# Patient Record
Sex: FEMALE | NOT HISPANIC OR LATINO | URBAN - METROPOLITAN AREA
[De-identification: names, ages, dates, MRNs, and addresses within clinical notes are randomized per-mention and may not be internally consistent; named-entity substitution may affect disease eponyms.]

---

## 2021-03-30 ENCOUNTER — OFFICE VISIT CONVERTED (OUTPATIENT)
Dept: INTERNAL MEDICINE | Facility: CLINIC | Age: 71
End: 2021-03-30
Attending: NURSE PRACTITIONER

## 2021-03-30 ENCOUNTER — CONVERSION ENCOUNTER (OUTPATIENT)
Dept: INTERNAL MEDICINE | Facility: CLINIC | Age: 71
End: 2021-03-30

## 2021-03-30 ENCOUNTER — HOSPITAL ENCOUNTER (OUTPATIENT)
Dept: OTHER | Facility: HOSPITAL | Age: 71
Discharge: HOME | End: 2021-03-30
Attending: NURSE PRACTITIONER

## 2021-03-30 LAB
AMPHET UR QL CFM: NEGATIVE
BARBITURATES UR QL: NEGATIVE
BASOPHILS # BLD AUTO: 0.02 10*3/UL (ref 0–0.2)
BASOPHILS NFR BLD AUTO: 0.3 % (ref 0–3)
BENZODIAZ UR QL SCN: POSITIVE
CONV ABS IMM GRAN: 0.02 10*3/UL (ref 0–0.2)
CONV AMP/METHAMP UR: NEGATIVE
CONV COCAINE, UR: NEGATIVE
CONV IMMATURE GRAN: 0.3 % (ref 0–1.8)
DEPRECATED RDW RBC AUTO: 46.2 FL (ref 36.4–46.3)
EOSINOPHIL # BLD AUTO: 0.06 10*3/UL (ref 0–0.7)
EOSINOPHIL # BLD AUTO: 0.9 % (ref 0–7)
ERYTHROCYTE [DISTWIDTH] IN BLOOD BY AUTOMATED COUNT: 14.6 % (ref 11.7–14.4)
HCT VFR BLD AUTO: 41.5 % (ref 37–47)
HGB BLD-MCNC: 12.6 G/DL (ref 12–16)
LYMPHOCYTES # BLD AUTO: 0.75 10*3/UL (ref 1–5)
LYMPHOCYTES NFR BLD AUTO: 10.8 % (ref 20–45)
MCH RBC QN AUTO: 26.2 PG (ref 27–31)
MCHC RBC AUTO-ENTMCNC: 30.4 G/DL (ref 33–37)
MCV RBC AUTO: 86.3 FL (ref 81–99)
MDMA UR QL SCN: NEGATIVE
METHADONE UR QL SCN: NEGATIVE
MONOCYTES # BLD AUTO: 0.36 10*3/UL (ref 0.2–1.2)
MONOCYTES NFR BLD AUTO: 5.2 % (ref 3–10)
NEUTROPHILS # BLD AUTO: 5.75 10*3/UL (ref 2–8)
NEUTROPHILS NFR BLD AUTO: 82.5 % (ref 30–85)
NRBC CBCN: 0 % (ref 0–0.7)
OPIATES UR QL SCN: NEGATIVE
OXYCODONE UR QL SCN: NEGATIVE
PCP UR QL: NEGATIVE
PLATELET # BLD AUTO: 333 10*3/UL (ref 130–400)
PMV BLD AUTO: 10.4 FL (ref 9.4–12.3)
RBC # BLD AUTO: 4.81 10*6/UL (ref 4.2–5.4)
THC SERPLBLD CFM-MCNC: NEGATIVE NG/ML
WBC # BLD AUTO: 6.96 10*3/UL (ref 4.8–10.8)

## 2021-03-31 LAB
ALBUMIN SERPL-MCNC: 4.1 G/DL (ref 3.5–5)
ALBUMIN/GLOB SERPL: 1.1 {RATIO} (ref 1.4–2.6)
ALP SERPL-CCNC: 90 U/L (ref 43–160)
ALT SERPL-CCNC: 15 U/L (ref 10–40)
ANION GAP SERPL CALC-SCNC: 18 MMOL/L (ref 8–19)
AST SERPL-CCNC: 23 U/L (ref 15–50)
BILIRUB SERPL-MCNC: 0.21 MG/DL (ref 0.2–1.3)
BUN SERPL-MCNC: 18 MG/DL (ref 5–25)
BUN/CREAT SERPL: 16 {RATIO} (ref 6–20)
CALCIUM SERPL-MCNC: 9.6 MG/DL (ref 8.7–10.4)
CHLORIDE SERPL-SCNC: 102 MMOL/L (ref 99–111)
CHOLEST SERPL-MCNC: 213 MG/DL (ref 107–200)
CHOLEST/HDLC SERPL: 3.9 {RATIO} (ref 3–6)
CONV CO2: 25 MMOL/L (ref 22–32)
CONV TOTAL PROTEIN: 7.8 G/DL (ref 6.3–8.2)
CREAT UR-MCNC: 1.15 MG/DL (ref 0.5–0.9)
GFR SERPLBLD BASED ON 1.73 SQ M-ARVRAT: 47 ML/MIN/{1.73_M2}
GLOBULIN UR ELPH-MCNC: 3.7 G/DL (ref 2–3.5)
GLUCOSE SERPL-MCNC: 85 MG/DL (ref 65–99)
HDLC SERPL-MCNC: 54 MG/DL (ref 40–60)
LDLC SERPL CALC-MCNC: 116 MG/DL (ref 70–100)
OSMOLALITY SERPL CALC.SUM OF ELEC: 293 MOSM/KG (ref 273–304)
POTASSIUM SERPL-SCNC: 4.4 MMOL/L (ref 3.5–5.3)
SODIUM SERPL-SCNC: 141 MMOL/L (ref 135–147)
T4 FREE SERPL-MCNC: 1.2 NG/DL (ref 0.9–1.8)
TRIGL SERPL-MCNC: 217 MG/DL (ref 40–150)
TSH SERPL-ACNC: 1.86 M[IU]/L (ref 0.27–4.2)
VLDLC SERPL-MCNC: 43 MG/DL (ref 5–37)

## 2021-04-15 ENCOUNTER — TELEMEDICINE CONVERTED (OUTPATIENT)
Dept: PSYCHIATRY | Facility: CLINIC | Age: 71
End: 2021-04-15
Attending: STUDENT IN AN ORGANIZED HEALTH CARE EDUCATION/TRAINING PROGRAM

## 2021-05-10 NOTE — H&P
"   History and Physical      Patient Name: Payal Singer   Patient ID: [id number]   Sex: Female   YOB: 1950        Visit Date: March 30, 2021    Provider: BREE Lovell   Location: [facility]   Location Address: [address] [place] [address], [address]  [place], [place]  [id number]   Location Phone: [phone]          Chief Complaint  · New Patient/ Establish Care  · \" cant sleep at night \"      History Of Present Illness  Payal Singer is a 71 year old female who presents for evaluation and treatment of:      Last PCP: [place], [place] Dr. Sylvia Paredes at [facility]   Fax number [phone] phone [phone] address: [address] [place] [address]  Last Labs: this past summer  Mammo: 2019  Colonoscopy: 2019  Flu 20-21: no  Pneumonia Shot: one in past    not sleeping well since being out of Seroquel    anxiety/depression-had hallucinations with a previous hospitalization while in [place]  has been on sertraline and venlafaxine together for a while  she has been out of the venlafaxine since the move from [place] about 1 mth ago   is verbally abusive and has some psychiatric illness as well. Daughter reports that he is currently under eval for dementia. pt reports feeling safe in her home and she goes into her room if her  becomes angry/upset. They are currently living with daughter's family    nerve pain of scalp-started on gabapentin for this. taking 300mg bid.    HTN-initially very elevated but believes this may be anxiety related. no checking at home routinely  HLD-no leg cramps       Past Medical History  Disease Name Date Onset Notes   Allergies --  --    Anxiety --  --    Depression (emotion) --  --    Forgetfulness --  --    Hypertension --  --    Reflux --  --          Past Surgical History  Procedure Name Date Notes   Cataract surgery 2002 --    Gallbladder removal 1971 --          Medication List  Name Date Started Instructions " "  aspirin 81 mg oral tablet,delayed release (DR/EC)  take 1 tablet (81 mg) by oral route once daily   atorvastatin 20 mg oral tablet  take 1 tablet (20 mg) by oral route once daily   clindamycin phosphate 1 % topical solution  for scalp   gabapentin 600 mg oral tablet  take .5 tablet (300 mg) by oral route 2 times per day   levothyroxine 75 mcg oral capsule  take 1 capsule (75 mcg) by oral route once daily   metoprolol tartrate 25 mg oral tablet  take 2 tablets (50 mg) by oral route once daily   quetiapine 25 mg oral tablet 03/30/2021 take 1 tablet (25 mg) by oral route 1 times per day   sertraline 100 mg oral tablet  take 2 tablets (200 mg) by oral route once daily   venlafaxine 150 mg oral tablet extended release 24hr  take 1 tablet (150 mg) by oral route once daily in the morning at the same time each day with food   Vitamin D2 1,250 mcg (50,000 unit) oral capsule  take 1 capsule by oral route weekly         Allergy List  Allergen Name Date Reaction Notes   Floxin --  --  --        Allergies Reconciled  Family Medical History  Disease Name Relative/Age Notes   Stroke Father/  Mother/   brother/sister   Heart Disease Father/  Mother/   --    Diabetes Father/   brother/sister aunt/uncle         Social History  Finding Status Start/Stop Quantity Notes   Tobacco Never --/-- --  --          Review of Systems  · Constitutional  o Denies  o : fever, fatigue, weight loss, weight gain  · Cardiovascular  o Denies  o : lower extremity edema, claudication, chest pressure, palpitations  · Respiratory  o Denies  o : shortness of breath, wheezing, frequent cough, hemoptysis, dyspnea on exertion  · Gastrointestinal  o Denies  o : nausea, vomiting, diarrhea, constipation, abdominal pain      Vitals  Date Time BP Position Site L\R Cuff Size HR RR TEMP (F) WT  HT  BMI kg/m2 BSA m2 O2 Sat FR L/min FiO2 HC       03/30/2021 02:33 /78 Sitting    77 - R  98.7 165lbs 8oz 5'  3\" 29.32 1.83 93 %      03/30/2021 02:47 /72 " Sitting                       Physical Examination  · Constitutional  o Appearance  o : no acute distress, well-nourished  · Head and Face  o Head  o :   § Inspection  § : atraumatic, normocephalic  · Eyes  o Eyes  o : extraocular movements intact, no scleral icterus, no conjunctival injection  · Ears, Nose, Mouth and Throat  o Ears  o :   § External Ears  § : normal  o Nose  o :   § Intranasal Exam  § : nares patent  o Oral Cavity  o :   § Oral Mucosa  § : moist mucous membranes  · Respiratory  o Respiratory Effort  o : breathing comfortably, symmetric chest rise  o Auscultation of Lungs  o : clear to asculatation bilaterally, no wheezes, rales, or rhonchii  · Cardiovascular  o Heart  o :   § Auscultation of Heart  § : regular rate and rhythm, no murmurs, rubs, or gallops  o Peripheral Vascular System  o :   § Extremities  § : no edema  · Lymphatic  o Neck  o : no lymphadenopathy present  · Neurologic  o Mental Status Examination  o :   § Orientation  § : grossly oriented to person, place and time  o Gait and Station  o :   § Gait Screening  § : normal gait  · Psychiatric  o General  o : normal mood and affect          Results  · In-Office Procedures  o Lab procedure  § IOP - Urine Drug Screen In-House [facility] (11282)   § Amphetamines Ur Ql: Negative   § Barbiturates Ur Ql: Negative   § Buprenorphine+Nor Ur Ql Scn: Negative   § Benzodiaz Ur Ql: Positive   § Cocaine Ur Ql: Negative   § Methadone Ur Ql: Negative   § Methamphet Ur Ql: Negative   § MDMA Ur Ql Scn: Negative   § Opiates Ur Ql: Negative   § Oxycodone Ur Ql: Negative   § PCP Ur Ql: Negative   § THC Ur Ql: Negative   § Temp in Range?: Within/Acceptable   § Control Seen?: Yes       Assessment  · Screening for depression     V79.0/Z13.89  · Visit for screening mammogram     V76.12/Z12.31  · Depression     311/F32.9  She is currently only taking sertraline for the last month. She was requesting that we restart venlafaxine today but discussed concerns with  duplicate therapy with sertraline and venlafaxine at rather high doses. She is agreeable to staying on sertraline only a until she sees psychiatry. We will restart Seroquel 25 mg nightly at night to help with insomnia.  · Essential hypertension     401.9/I10  · Hyperlipidemia     272.4/E78.5  We will check labs in the office today. Will review and adjust medications as needed  · Hypothyroidism     244.9/E03.9  We will check labs in the office today. Will review and adjust medications as needed  · Insomnia, unspecified     780.52/G47.00  Reviewed sleep hygiene behaviors. Restarting Seroquel  · Anxiety     300.02/F41.1  · Hypertension     401.9/I10  Discussed elevated blood pressure at today's visit. Discussed risks of blood pressure elevation including death, heart attack, stroke, chronic kidney disease, blindness. Follow a low-salt diet and increase exercise. Discussed importance of medication compliance and avoidance of missing doses. Continue current meds at this time. We will monitor at follow-up and adjust blood pressure medications if necessary. ER with chest pain, palpitations, vision loss, unilateral weakness, or altered mental status. Patient verbalized understanding  · Neuropathic pain     729.2/M79.2  He has been taking gabapentin 300 mg twice daily and this has been effective for controlling pain of her scalp. She reports initially taking 800 mg which caused significant issues with cognition and frequent falls. She reports that these issues resolved with decreasing the dose. We will get UDS and controlled substance agreement completed during the visit today.    Problems Reconciled  Plan  · Orders  o Screening Mammography; Bilateral 3D (70107, , 50992) - V76.12/Z12.31 - 03/30/2021  o CBC with Auto Diff [facility] (45610) - 401.9/I10 - 03/30/2021  o HTN/Lipid Panel (CMP, Lipid) [facility] (55364, 52919) - 272.4/E78.5 - 03/30/2021  o Thyroid Profile (46459, THYII, 10297) - 244.9/E03.9 - 03/30/2021  o ACO-18: Positive  screen for clinical depression using a standardized tool and a follow-up plan documented () - - 03/30/2021  o ACO-14: Influenza immunization was not administered for reasons documented [facility] () - - 03/30/2021  o ACO-13: Fall Risk Screening with 2 or more falls in past year or any fall with injury in the past year (1100F) - - 03/30/2021  o ACO-39: Current medications updated and reviewed (, 1159F) - - 03/30/2021  o PSYCHIATRY CONSULTATION (PSYCH) - 300.02/F41.1, 311/F32.9 - 03/30/2021   Dr. Peterson  · Medications  o quetiapine 25 mg oral tablet   SIG: take 1 tablet (25 mg) by oral route 1 times per day   DISP: (90) Tablet with 1 refills  Adjusted on 03/30/2021     o Medications have been Reconciled  o Transition of Care or Provider Policy  · Instructions  o Depression Screen completed and scanned into the EMR under the designated folder within the patient's documents.  o Today's PHQ-9 result is 17  o Advised that cheeses and other sources of dairy fats, animal fats, fast food, and the extras (candy, pastries, pies, doughnuts and cookies) all contain LDL raising nutrients. Advised to increase fruits, vegetables, whole grains, and to monitor portion sizes.   o Avoid any electronic use for at least 30 minutes prior to bed time. Cell phone screens, tablets and TVs imitate daylight, so your brain can become confused on the time of day. No caffeine use in the late afternoon and evenings.  o Patient was educated/instructed on their diagnosis, treatment and medications prior to discharge from the clinic today.  o Call the office with any concerns or questions.  · Disposition  o Call or Return if symptoms worsen or persist.  o Follow up in 2 months  o labs drawn in house today  o Prescriptions sent electronically            Electronically Signed by: BREE Lovell -Author on March 30, 2021 07:46:21 PM

## 2021-05-11 NOTE — H&P
"   History and Physical      Patient Name: Payal Singer   Patient ID: [id number]   Sex: Female   YOB: 1950    Referring Provider: Alyson GIRON    Visit Date: April 15, 2021    Provider: Riki Peterson MD   Location: [facility]   Location Address: [address] [place] [address]  [address] [place] [address]  [id number]   Location Phone: [phone]          Chief Complaint     \"The main thing is hallucinations.\"       History Of Present Illness  Payal Singer is a 71 year old female who presents to the office today referred by Alyson GIRON.   Chart review 4/15: Seen 3/30 to establish care. Hx of anx/dep avh in [place]. on both sertraline and venlafaxine, was on Seroquel. Hx of HTN, GERD. On gabapentin 600 tid, sertraline 200 d, venla 150 d, quetiap 25 qhs. March 2021 labs: cbc shows low mch, high rdw; Cr 1.15 high, abnormal lipids, TSH and fT4 wnl. No OP head imaging, ekg.   Virtual visit via Zoom audio and video due to the COVID-19 pandemic. Patient is accepting of and agreeable to appointment. The appointment consisted of the patient and I only. Interview: Patient was also accompanied by her daughter Meagan briefly. I received some collateral from her. Patient reports beginning to see hallucinations in summer of last year. At that time, she saw a man with a skull and candles lying on her couch when she was going to sleep. She ran out of the room and called her neighbors, who returned to the room and did not see anything. Patient also reports seeing bugs, and shadows. She was admitted to a hospital in [place] for these hallucinations, where she was placed on quetiapine. It has been helpful for her, but she continues to experience the hallucinations. Lately, she sees a bug coming down from the ceiling on a white stalk before she goes to sleep each night. Once she is asleep, she no longer experiences the hallucinations. She never experiences them during the day. She has never experienced " "hallucinations before the summer of last year.   The hallucinations only occur as she is going to sleep (hypnagogic). The hallucinations do not occur every night. The hallucinations do not occur in the middle of the night or in the morning. The hallucinations include both visual and tactile hallucinations, vivid. Never auditory. Denies cataplexy, denies sleep paralysis. Notes some daytime sleepiness.   Patient has never seen a neurologist before. She had been set up to see one prior to moving from [place] to [place], but because of the move, she never saw the neurologist. She does note she is more forgetful these days. Her daughter has begun to help her more with groceries and finances. She also notes trouble with walking since the summer of last year. Last summer, she had a nasty fall that involved hitting her head and needing stitches.   Patient is somewhat of a poor historian. Often times she will answer a question with \"no,\" and then contradict her self moments later. She does not understand her medical conditions or of the medications that she is on. I also spoke with Meagan who confirms cognitive decline recently. Meagan is helping patient more with her groceries and finances these days. Otherwise patient can take care of herself. Patient denies SI HI AH. Has access to weapons that are locked away. Psychiatric review of systems is positive for anxiety, depression, and hallucinations.   Past Psychiatric History:  Began Psychiatric Treatment: The summer of last year when she was diagnosed with depression and anxiety   Dx: Depression and anxiety   Psychiatrist: She cannot remember the name. But she saw a psychiatrist inpatient in [place] last summer. She also saw psychiatrist afterwards for 1-2 visits.   Therapist: Denies   : Denies   Admissions History: Admission in [place] is unclear if he was psychiatric only. This occurred last summer for 3 to 4 days.   Medication Trials: Venlafaxine and sertraline, " both of which were helpful.   Self-Harm: Denies   Suicide Attempts: Denies   Substance Abuse History:  Types: Denies all, including illicit   Withdrawl Symptoms: Denies   Longest period sober: Not applicable   AA: N/A   Admissions History: Denies   Residential History: Denies   Legal: N/A   Social History:  Marital Status:    Employed: No     Kids: 4 children   House: Lives in a house    Hx: Denies   Family History:  Suicide Attempts: Denies   Suicide Completions: Denies   Substance Use: Denies   Psychiatric Conditions: Denies. Denies dementia in the family.    depression, psychosis, anxiety: Deferred   Developmental History:  Born: [place]   Siblings: 1 brother and 1 sister   Childhood: no abuse   High School: Completed   College: Denies       Past Medical History  Disease Name Date Onset Notes   Allergies --  --    Anxiety --  --    Depression (emotion) --  --    Forgetfulness --  --    Hypertension --  --    Reflux --  --          Past Surgical History  Procedure Name Date Notes   Cataract surgery  --    Gallbladder removal  --          Medication List  Name Date Started Instructions   aspirin 81 mg oral tablet,delayed release (DR/EC)  take 1 tablet (81 mg) by oral route once daily   atorvastatin 40 mg oral tablet 2021 take 1 tablet (40 mg) by oral route once daily for 90 days   clindamycin phosphate 1 % topical solution  for scalp   gabapentin 600 mg oral tablet  take .5 tablet (300 mg) by oral route 2 times per day   levothyroxine 75 mcg oral capsule  take 1 capsule (75 mcg) by oral route once daily   metoprolol tartrate 25 mg oral tablet  take 2 tablets (50 mg) by oral route once daily   omeprazole 40 mg oral capsule,delayed release(DR/EC) 2021 take 1 capsule (40 mg) by oral route once daily before a meal for 90 days   quetiapine 25 mg oral tablet 2021 take 1 tablet (25 mg) by oral route 1 times per day   sertraline 100 mg oral tablet  take 2 tablets (200 mg) by  oral route once daily   venlafaxine 150 mg oral tablet extended release 24hr  take 1 tablet (150 mg) by oral route once daily in the morning at the same time each day with food   Vitamin D2 1,250 mcg (50,000 unit) oral capsule  take 1 capsule by oral route weekly         Allergy List  Allergen Name Date Reaction Notes   Floxin --  --  --          Family Medical History  Disease Name Relative/Age Notes   Stroke Father/  Mother/   brother/sister   Heart Disease Father/  Mother/   --    Diabetes Father/   brother/sister aunt/uncle         Social History  Finding Status Start/Stop Quantity Notes   Tobacco Never --/-- --  --          Immunizations  NameDate Admin Mfg Trade Name Lot Number Route Inj VIS Given VIS Publication   InfluenzaDeferred 03/30/2021 NE Not Entered  NE NE     Comments:          Review of Systems  · Constitutional  o Denies  o : fatigue, night sweats  · Eyes  o Denies  o : double vision, blurred vision  · HENT  o Denies  o : vertigo, recent head injury  · Cardiovascular  o Denies  o : chest pain, irregular heart beats  · Respiratory  o Admits  o : shortness of breath  o Denies  o : productive cough  · Gastrointestinal  o Denies  o : nausea, vomiting  · Genitourinary  o Denies  o : dysuria, urinary retention  · Integument  o Denies  o : hair growth change, new skin lesions  · Neurologic  o Denies  o : altered mental status, seizures  · Musculoskeletal  o Denies  o : joint swelling, limitation of motion  · Endocrine  o Denies  o : cold intolerance, heat intolerance  · Psychiatric  o Admits  o : anxiety, hallucinations  o Denies  o : depression, post traumatic stress disorder, obsessive compulsive disorder, psychosis, casandra  · Allergic-Immunologic  o Denies  o : frequent illnesses      Physical Examination  · Mental Status Exam  o Appearance  o : good eye contact, normal street clothes, groomed, sitting on a couch  o Behavior  o : pleasant and cooperative  o Motor  o : no abnormal  o Speech  o : normal  "rhythm, rate, volume, tone, not hyperverbal, not pressured, normal prosidy  o Mood  o : \"Okay\"  o Affect  o : Slightly constricted, no tears  o Thought Content  o : negative suicidal ideations, negative homicidal ideations, negative obsessions  o Perceptions  o : negative auditory hallucinations, negative visual hallucinations  o Thought Process  o : Slightly circumstantial  o Insight/Judgement  o : fair/fair  o Cognition  o : grossly intact  o Attention  o : intact          Assessment  · Screening for depression     V79.0/Z13.31  · Anxiety     300.02/F41.1  · Depression (emotion)     311/F32.9  · Hallucinations     780.1/R44.3       Presentation most consistent with developing dementia, likely Lewy body dementia.  Patient also reports worsening gait abnormalities; she fell in the summer of last year, injuring her head, and requiring stitches.  Referral to neurologist is appropriate for further management.  Patient also has a history of depression, possible PTSD by review of chart.  Patient did not mention PTSD during the interview, but she is not a very good historian.  Often times she will say no to a question, and then contradict herself only a few moments later.  She has some trouble with knowing her medications or her medical conditions.    Continue sertraline and Seroquel.  The Seroquel is helping her to some extent.  Caution is advised in terms of increasing the dose given the likely sensitivity she has to antipsychotics.  Patient advised not to restart venlafaxine as she is already on sertraline.    We will see the patient back in 2 months to discuss PTSD, depression more fully.    Psychotherapy is deferred at this time.       Plan  · Orders  o ACO-18: Positive screen for clinical depression using a standardized tool and a follow-up plan documented () - V79.0/Z13.31 - 04/15/2021  o NEUROLOGY CONSULTATION. (NEURO) - V79.0/Z13.31, 311/F32.9, 300.02/F41.1, 780.1/R44.3 - 04/15/2021   Rule out lewy body " dementia  o ACO-39: Current medications updated and reviewed (, 1159F) - - 04/15/2021  · Medications  o venlafaxine 150 mg oral tablet extended release 24hr   SIG: take 1 tablet (150 mg) by oral route once daily in the morning at the same time each day with food   DISP: (0) Tablet with 0 refills  Discontinued on 04/15/2021     o Medications have been Reconciled  o Transition of Care or Provider Policy  · Instructions  o Consults: neurology for developing LB dementia.  o Risk Assessment: Risk of self-harm acutely is low. Risk factors include anxiety disorder, depressive disorder, access to weapons, recent psychosocial stressors (pandemic). Protective factors include no family history, no present SI, no history of suicide attempts or self-harm in the past, no access to weapons, no AODA, healthcare seeking, future orientation, willingness to engage in care. Risk of self-harm chronically is also low, but could be further elevated in the event of treatment noncompliance and/or AODA.  o Safety: No acute safety concerns.  o Medications: No change to medications. Continue sertraline 200 mg a day, this dose may have to be decreased given elevated creatinine. Patient has discontinued venlafaxine, and this is appropriate. Continue Seroquel 25 mg p.o. nightly.  o Therapy: Deferred.  o Follow Up: 2 months.  · Disposition  o Follow Up in 2 months.  · Correspondence  o Behavioral Health Letter to Referring MD (Alyson GIRON) - 04/15/2021            Electronically Signed by: Riki Peterson MD -Author on April 15, 2021 11:03:53 AM

## 2021-05-14 VITALS
HEART RATE: 77 BPM | TEMPERATURE: 98.7 F | SYSTOLIC BLOOD PRESSURE: 184 MMHG | DIASTOLIC BLOOD PRESSURE: 78 MMHG | WEIGHT: 165.5 LBS | OXYGEN SATURATION: 93 % | BODY MASS INDEX: 29.32 KG/M2 | HEIGHT: 63 IN

## 2021-05-24 ENCOUNTER — HOSPITAL ENCOUNTER (OUTPATIENT)
Dept: SURGERY | Facility: CLINIC | Age: 71
Discharge: HOME | End: 2021-05-24
Attending: UROLOGY

## 2021-05-24 LAB
ANION GAP SERPL CALC-SCNC: 14 MMOL/L (ref 8–19)
APPEARANCE UR: ABNORMAL
BILIRUB UR QL: NEGATIVE
BUN SERPL-MCNC: 18 MG/DL (ref 5–25)
BUN/CREAT SERPL: 18 {RATIO} (ref 6–20)
CALCIUM SERPL-MCNC: 9.2 MG/DL (ref 8.7–10.4)
CHLORIDE SERPL-SCNC: 101 MMOL/L (ref 99–111)
COLOR UR: YELLOW
CONV BACTERIA: ABNORMAL
CONV CO2: 29 MMOL/L (ref 22–32)
CONV COLLECTION SOURCE (UA): ABNORMAL
CONV HYALINE CASTS IN URINE MICRO: ABNORMAL /[LPF]
CONV UROBILINOGEN IN URINE BY AUTOMATED TEST STRIP: 1 {EHRLICHU}/DL (ref 0.1–1)
CREAT UR-MCNC: 1.02 MG/DL (ref 0.5–0.9)
GFR SERPLBLD BASED ON 1.73 SQ M-ARVRAT: 55 ML/MIN/{1.73_M2}
GLUCOSE SERPL-MCNC: 85 MG/DL (ref 65–99)
GLUCOSE UR QL: NEGATIVE MG/DL
HGB UR QL STRIP: ABNORMAL
KETONES UR QL STRIP: NEGATIVE MG/DL
LEUKOCYTE ESTERASE UR QL STRIP: ABNORMAL
NITRITE UR QL STRIP: POSITIVE
OSMOLALITY SERPL CALC.SUM OF ELEC: 291 MOSM/KG (ref 273–304)
PH UR STRIP.AUTO: 5.5 [PH] (ref 5–8)
POTASSIUM SERPL-SCNC: 3.9 MMOL/L (ref 3.5–5.3)
PROT UR QL: 300 MG/DL
RBC #/AREA URNS HPF: ABNORMAL /[HPF]
SODIUM SERPL-SCNC: 140 MMOL/L (ref 135–147)
SP GR UR: 1.03 (ref 1–1.03)
WBC #/AREA URNS HPF: ABNORMAL /[HPF]

## 2021-06-01 ENCOUNTER — CONVERSION ENCOUNTER (OUTPATIENT)
Dept: INTERNAL MEDICINE | Facility: CLINIC | Age: 71
End: 2021-06-01

## 2021-06-01 ENCOUNTER — HOSPITAL ENCOUNTER (OUTPATIENT)
Dept: OTHER | Facility: HOSPITAL | Age: 71
Discharge: HOME | End: 2021-06-01
Attending: NURSE PRACTITIONER

## 2021-06-01 ENCOUNTER — TRANSCRIBE ORDERS (OUTPATIENT)
Dept: ADMINISTRATIVE | Facility: HOSPITAL | Age: 71
End: 2021-06-01

## 2021-06-01 ENCOUNTER — OFFICE VISIT CONVERTED (OUTPATIENT)
Dept: INTERNAL MEDICINE | Facility: CLINIC | Age: 71
End: 2021-06-01
Attending: NURSE PRACTITIONER

## 2021-06-01 DIAGNOSIS — Z12.31 SCREENING MAMMOGRAM, ENCOUNTER FOR: Primary | ICD-10-CM

## 2021-06-01 LAB
APPEARANCE UR: ABNORMAL
BILIRUB UR QL STRIP: NORMAL
BILIRUB UR QL: NEGATIVE
COLOR UR: NORMAL
COLOR UR: YELLOW
CONV BACTERIA: ABNORMAL
CONV CLARITY OF URINE: NORMAL
CONV COLLECTION SOURCE (UA): ABNORMAL
CONV PROTEIN IN URINE BY AUTOMATED TEST STRIP: NORMAL
CONV UROBILINOGEN IN URINE BY AUTOMATED TEST STRIP: 1 {EHRLICHU}/DL (ref 0.1–1)
CONV UROBILINOGEN IN URINE BY AUTOMATED TEST STRIP: NORMAL
GLUCOSE UR QL: NEGATIVE
GLUCOSE UR QL: NEGATIVE MG/DL
HGB UR QL STRIP: ABNORMAL
HGB UR QL STRIP: NORMAL
KETONES UR QL STRIP: ABNORMAL MG/DL
KETONES UR QL STRIP: NORMAL
LEUKOCYTE ESTERASE UR QL STRIP: ABNORMAL
LEUKOCYTE ESTERASE UR QL STRIP: NEGATIVE
NITRITE UR QL STRIP: NEGATIVE
NITRITE UR QL STRIP: NEGATIVE
PH UR STRIP.AUTO: 5.5 [PH]
PH UR STRIP.AUTO: 5.5 [PH] (ref 5–8)
PROT UR QL: >300 MG/DL
RBC #/AREA URNS HPF: ABNORMAL /[HPF]
SP GR UR: 1.03 (ref 1–1.03)
SP GR UR: NORMAL
SQUAMOUS SPT QL MICRO: ABNORMAL /[HPF]
WBC #/AREA URNS HPF: ABNORMAL /[HPF]

## 2021-06-02 ENCOUNTER — TRANSCRIBE ORDERS (OUTPATIENT)
Dept: ADMINISTRATIVE | Facility: HOSPITAL | Age: 71
End: 2021-06-02

## 2021-06-02 DIAGNOSIS — R44.3 HALLUCINATIONS: ICD-10-CM

## 2021-06-02 DIAGNOSIS — R51.9 FREQUENT HEADACHES: Primary | ICD-10-CM

## 2021-06-04 LAB
AMPICILLIN SUSC ISLT: 8
AMPICILLIN+SULBAC SUSC ISLT: <=2
BACTERIA UR CULT: ABNORMAL
CEFAZOLIN SUSC ISLT: <=4
CEFEPIME SUSC ISLT: <=0.12
CEFTAZIDIME SUSC ISLT: <=1
CEFTRIAXONE SUSC ISLT: <=0.25
CIPROFLOXACIN SUSC ISLT: <=0.25
ERTAPENEM SUSC ISLT: <=0.12
GENTAMICIN SUSC ISLT: <=1
LEVOFLOXACIN SUSC ISLT: <=0.12
NITROFURANTOIN SUSC ISLT: <=16
PIP+TAZO SUSC ISLT: <=4
TMP SMX SUSC ISLT: <=20
TOBRAMYCIN SUSC ISLT: <=1

## 2021-06-06 NOTE — PROGRESS NOTES
"   Progress Note      Patient Name: Payal Singer   Patient ID: [id number]   Sex: Female   YOB: 1950    Referring Provider: Alyson GIRON    Visit Date: June 1, 2021    Provider: BREE HARPER   Location: [facility]   Location Address: [address] [place] [address], [address]  [place], [place]  [id number]   Location Phone: [phone]          Chief Complaint  · Follow Up   · \" Labs done last week \"  · \" bad headaches lately\"   · \" having panic attacks \"      History Of Present Illness  Payal Singer is a 71 year old female who presents for evaluation and treatment of:      Patient presents for a follow up on chronic management with her daughter.    Panic attacks- felt like she could not breath and tightness in her chest. They are occurring about once a week. She has discussed this with . Also seeing Dr. Peterson for anxiety, she has a follow up with him on June 15th. Hallucinating continues seeing rats, spiders, use to see people as well occurring about 1-2 times a week. Increased urination, checked with . Plans to transition off the Zoloft and onto venlafaxine.     Headaches- 3 this past week, weakness is on both side. Pain is in the back of the head and runs forward into the forehead. Neurology appointment this month related to headaches. No recent imaging. Starting to have forgetfulness.     HTN- metoprolol 25 mg, not on other medication prior. Denies chest pain, shortness of breath. Reports headaches in the occipital. Is not checking blood pressure often at home.     Urinary frequency continues with mild dysuria. Denies urgency, nausea, vomiting, diarrhea, abdominal pain.     Overall weakness per the daughter. She is having more frequent falls. Lives with her daughter, son in law and 5 kids.     Bilateral knee pain- several years now.       Past Medical History  Disease Name Date Onset Notes   Allergies --  --    Anxiety --  --    Depression (emotion) --  --  "   Forgetfulness --  --    Hallucination --  --    Hallucinations 04/15/2021 --    Head injury --  --    Hypertension --  --    PTSD (post-traumatic stress disorder) --  --    Reflux --  --          Past Surgical History  Procedure Name Date Notes   Cataract surgery 2002 --    Gallbladder removal 1971 --          Medication List  Name Date Started Instructions   aspirin 81 mg oral tablet,delayed release (DR/EC)  take 1 tablet (81 mg) by oral route once daily   atorvastatin 40 mg oral tablet 04/05/2021 take 1 tablet (40 mg) by oral route once daily for 90 days   clindamycin phosphate 1 % topical solution  for scalp   gabapentin 600 mg oral tablet  take .5 tablet (300 mg) by oral route 2 times per day   levothyroxine 75 mcg oral capsule 04/20/2021 take 1 capsule (75 mcg) by oral route once daily   metoprolol tartrate 25 mg oral tablet  take 2 tablets (50 mg) by oral route once daily   omeprazole 40 mg oral capsule,delayed release(DR/EC) 04/01/2021 take 1 capsule (40 mg) by oral route once daily before a meal for 90 days   quetiapine 25 mg oral tablet 03/30/2021 take 1 tablet (25 mg) by oral route 1 times per day   sertraline 100 mg oral tablet 05/19/2021 take 1 tablet (100 mg) by oral route once daily for 60 days   Vitamin D2 1,250 mcg (50,000 unit) oral capsule  take 1 capsule by oral route weekly         Allergy List  Allergen Name Date Reaction Notes   Floxin --  --  --        Allergies Reconciled  Family Medical History  Disease Name Relative/Age Notes   Stroke Father/  Mother/   brother/sister   Heart Disease Father/  Mother/   --    Diabetes Father/   brother/sister aunt/uncle   Family history of anxiety disorder  --          Social History  Finding Status Start/Stop Quantity Notes   Tobacco Never --/-- --  --          Immunizations  NameDate Admin Mfg Trade Name Lot Number Route Inj VIS Given VIS Publication   InfluenzaDeferred 03/30/2021 NE Not Entered  NE NE     Comments:          Review of  "Systems  · Constitutional  o Admits  o : falls  o Denies  o : fever, fatigue, weight loss, weight gain  · Cardiovascular  o Denies  o : lower extremity edema, claudication, chest pressure, palpitations  · Respiratory  o Denies  o : shortness of breath, wheezing, frequent cough, hemoptysis, dyspnea on exertion  · Gastrointestinal  o Denies  o : nausea, vomiting, diarrhea, constipation, abdominal pain  · Genitourinary  o Admits  o : urinary frequency, dysuria, incontinence  o Denies  o : urinary urgency, change in urine color, urinary retention  · Neurologic  o Admits  o : altered mental status, incoordination, memory problems, falls, headaches  o Denies  o : slurred speech , tremor, dizziness  · Psychiatric  o Admits  o : anxiety, hallucinations, memory changes  o Denies  o : depression, suicidal ideation, homicidal ideation, mood changes, SI/HI      Vitals  Date Time BP Position Site L\R Cuff Size HR RR TEMP (F) WT  HT  BMI kg/m2 BSA m2 O2 Sat FR L/min FiO2 HC       03/30/2021 02:33 /78 Sitting    77 - R  98.7 165lbs 8oz 5'  3\" 29.32 1.83 93 %      03/30/2021 02:47 /72 Sitting                 06/01/2021 02:07 /88 Sitting    64 - R  99.5 159lbs 0oz 5'  3\" 28.17 1.79 94 %      06/01/2021 03:07 /84 Sitting                       Physical Examination  · Constitutional  o Appearance  o : no acute distress, well-nourished  · Head and Face  o Head  o :   § Inspection  § : atraumatic, normocephalic  · Eyes  o Eyes  o : extraocular movements intact, no scleral icterus, no conjunctival injection  · Ears, Nose, Mouth and Throat  o Ears  o :   § External Ears  § : normal  § Otoscopic Examination  § : tympanic membrane appearance within normal limits bilaterally  o Nose  o :   § Intranasal Exam  § : nares patent  o Oral Cavity  o :   § Oral Mucosa  § : moist mucous membranes  o Throat  o :   § Oropharynx  § : no inflammation or lesions present, tonsils within normal limits  · Respiratory  o Respiratory " Effort  o : breathing comfortably, symmetric chest rise  o Auscultation of Lungs  o : clear to asculatation bilaterally, no wheezes, rales, or rhonchii  · Cardiovascular  o Heart  o :   § Auscultation of Heart  § : regular rate and rhythm, no murmurs, rubs, or gallops  o Peripheral Vascular System  o :   § Extremities  § : no edema  · Gastrointestinal  o Abdominal Examination  o :   § Abdomen  § : bowel sounds present, non-distended, non-tender  · Lymphatic  o Neck  o : no lymphadenopathy present  o Supraclavicular Nodes  o : no supraclavicular nodes  · Skin and Subcutaneous Tissue  o General Inspection  o : no lesions present, no areas of discoloration, skin turgor normal  · Neurologic  o Mental Status Examination  o :   § Orientation  § : grossly oriented to person, place and time  o Gait and Station  o :   § Gait Screening  § : normal gait  · Psychiatric  o General  o : normal mood and affect          Results  · In-Office Procedures  o Lab procedure  § IOP - Urinalysis without Microscopy ([facility]) [facility] ([id number])   § Color Ur: Orange   § Clarity Ur: Slightly cloudy   § Glucose Ur Ql Strip: Negative   § Bilirub Ur Ql Strip: Small   § Ketones Ur Ql Strip: Trace   § Sp Gr Ur Qn: greater than 1.030   § Hgb Ur Ql Strip: Moderate   § pH Ur-LsCnc: 5.5   § Prot Ur Ql Strip: >=300 mg/dL   § Urobilinogen Ur Strip-mCnc: 0.2 E.U./dL   § Nitrite Ur Ql Strip: Negative   § WBC Est Ur Ql Strip: Negative       Assessment  · Hallucinations     780.1/R44.3  Seeing , UTI treated at this time.   · Anxiety     300.02/F41.1  Continue regimen and follow up .   · Hypertension     401.9/I10  poorly controlled, will start lisinopril 10 mg to be renal protected as well and maintain her blood pressure. Follow up in 1 month. Blood pressure log advised.   · Depression (emotion)     311/F32.9  · Headache     784.0/R51.9  Neurology referral in place. MRI of the brain ordered.   · Urinary frequency     788.41/R35.0  infection present  on urine sample.     Problems Reconciled  Plan  · Orders  o Urinalysis with Reflex Microscopy ([facility]) (27207) - 780.1/R44.3 - 06/01/2021  o Urine culture (34653, 11676) - 780.1/R44.3 - 06/01/2021  o ACO-39: Current medications updated and reviewed (1159F, ) - - 06/01/2021  o MRI brain w/ contrast (76508) - 311/F32.9, 780.1/R44.3, 784.0/R51.9 - 06/01/2021  · Medications  o lisinopril 10 mg oral tablet   SIG: take 1 tablet (10 mg) by oral route once daily   DISP: (30) Tablet with 2 refills  Prescribed on 06/01/2021     o Macrobid 100 mg oral capsule   SIG: take 1 capsule (100 mg) by oral route every 12 hours with food for 7 days   DISP: (14) Capsule with 0 refills  Prescribed on 06/01/2021     o Medications have been Reconciled  o Transition of Care or Provider Policy  · Instructions  o Patient was educated/instructed on their diagnosis, treatment and medications prior to discharge from the clinic today.  o Call the office with any concerns or questions.  o Electronically Identified Patient Education Materials Provided Electronically  · Disposition  o Call or Return if symptoms worsen or persist.  o Meds sent to pharmacy  o 1 month follow up            Electronically Signed by: BREE HARPER -Author on June 1, 2021 05:21:45 PM

## 2021-06-10 ENCOUNTER — APPOINTMENT (OUTPATIENT)
Dept: MRI IMAGING | Facility: HOSPITAL | Age: 71
End: 2021-06-10

## 2021-06-15 ENCOUNTER — TELEMEDICINE (OUTPATIENT)
Dept: PSYCHIATRY | Facility: CLINIC | Age: 71
End: 2021-06-15

## 2021-06-15 DIAGNOSIS — F03.90 DEMENTIA WITHOUT BEHAVIORAL DISTURBANCE, UNSPECIFIED DEMENTIA TYPE: ICD-10-CM

## 2021-06-15 DIAGNOSIS — R44.3 HALLUCINATIONS: ICD-10-CM

## 2021-06-15 DIAGNOSIS — R68.89 FORGETFULNESS: Primary | ICD-10-CM

## 2021-06-15 DIAGNOSIS — F41.1 GENERALIZED ANXIETY DISORDER: ICD-10-CM

## 2021-06-15 PROBLEM — K21.9 ESOPHAGEAL REFLUX: Status: ACTIVE | Noted: 2021-06-15

## 2021-06-15 PROBLEM — F41.9 ANXIETY: Status: ACTIVE | Noted: 2021-06-15

## 2021-06-15 PROBLEM — F32.A DEPRESSION: Status: ACTIVE | Noted: 2021-06-15

## 2021-06-15 PROBLEM — S09.90XA HEAD INJURY: Status: ACTIVE | Noted: 2021-06-15

## 2021-06-15 PROBLEM — J01.80 OTHER ACUTE SINUSITIS: Status: ACTIVE | Noted: 2021-06-15

## 2021-06-15 PROBLEM — F43.10 PTSD (POST-TRAUMATIC STRESS DISORDER): Status: ACTIVE | Noted: 2021-06-15

## 2021-06-15 PROBLEM — I10 HYPERTENSION: Status: ACTIVE | Noted: 2021-06-15

## 2021-06-15 PROCEDURE — 99214 OFFICE O/P EST MOD 30 MIN: CPT | Performed by: STUDENT IN AN ORGANIZED HEALTH CARE EDUCATION/TRAINING PROGRAM

## 2021-06-15 RX ORDER — CLINDAMYCIN PHOSPHATE 11.9 MG/ML
SOLUTION TOPICAL
COMMUNITY
Start: 2021-04-19 | End: 2022-07-19

## 2021-06-15 RX ORDER — VENLAFAXINE 37.5 MG/1
37.5 TABLET ORAL DAILY
Qty: 60 TABLET | Refills: 2 | Status: SHIPPED | OUTPATIENT
Start: 2021-06-15 | End: 2021-07-27 | Stop reason: SDUPTHER

## 2021-06-15 RX ORDER — OMEPRAZOLE 40 MG/1
CAPSULE, DELAYED RELEASE ORAL
COMMUNITY
Start: 2021-04-01 | End: 2021-10-08 | Stop reason: SDUPTHER

## 2021-06-15 RX ORDER — GABAPENTIN 600 MG/1
300 TABLET ORAL 2 TIMES DAILY
COMMUNITY
End: 2022-03-14

## 2021-06-15 RX ORDER — SERTRALINE HYDROCHLORIDE 100 MG/1
100 TABLET, FILM COATED ORAL DAILY
COMMUNITY
Start: 2021-05-19 | End: 2021-06-15

## 2021-06-15 RX ORDER — ATORVASTATIN CALCIUM 40 MG/1
TABLET, FILM COATED ORAL
COMMUNITY
Start: 2021-04-05 | End: 2021-08-13 | Stop reason: SDUPTHER

## 2021-06-15 RX ORDER — ERGOCALCIFEROL 1.25 MG/1
CAPSULE ORAL
COMMUNITY
End: 2021-08-13 | Stop reason: SDUPTHER

## 2021-06-15 RX ORDER — NITROFURANTOIN 25; 75 MG/1; MG/1
CAPSULE ORAL
COMMUNITY
Start: 2021-06-01 | End: 2021-10-08

## 2021-06-15 RX ORDER — ASPIRIN 81 MG/1
TABLET ORAL
COMMUNITY

## 2021-06-15 RX ORDER — LEVOTHYROXINE SODIUM 0.07 MG/1
TABLET ORAL
COMMUNITY
Start: 2021-06-11 | End: 2021-09-08 | Stop reason: SDUPTHER

## 2021-06-15 RX ORDER — LISINOPRIL 10 MG/1
10 TABLET ORAL DAILY
COMMUNITY
Start: 2021-06-01 | End: 2021-09-08 | Stop reason: SDUPTHER

## 2021-06-15 RX ORDER — QUETIAPINE FUMARATE 25 MG/1
50 TABLET, FILM COATED ORAL NIGHTLY
Qty: 120 TABLET | Refills: 2 | Status: SHIPPED | OUTPATIENT
Start: 2021-06-15 | End: 2021-10-11

## 2021-06-15 NOTE — PATIENT INSTRUCTIONS
1.  Please return to clinic at your next scheduled visit.  Contact the clinic ([phone]) at least 24 hours prior in the event you need to cancel.  2.  Do no harm to yourself or others.    3.  Avoid alcohol and drugs.    4.  Take all medications as prescribed.  Please contact the clinic with any concerns. If you are in need of medication refills, please call the clinic at [phone].    5. Should you want to get in touch with your provider, Dr. Riki Peterson, please utilize [facility] or contact the office ([phone]), and staff will be able to page Dr. Peterson directly.  6.  In the event you have personal crisis, contact the following crisis numbers: Suicide Prevention Hotline 1-726.398.8890; [facility] Helpline 1-672-909-XSGA; [facility] Emergency Room [phone]; text HELLO to 545491; or 077.     SPECIFIC RECOMMENDATIONS:     1.      Medications discussed at this encounter:                   - reduce sertraline to 50 mg daily for 3 days, then STOP. START venlafaxine 37.5 mg daily. Increase seroquel to 50 mg nightly.     2.      Psychotherapy recommendations: Deferred     3.     Return to clinic: 6 weeks

## 2021-06-15 NOTE — PROGRESS NOTES
"Subjective   Payal Singer is a 71 y.o. female who presents today for follow up    Chief Complaint:  \"I'm anxious about my .\"    History of Present Illness:     Chart review 4/15: Seen 3/30 to establish care. Hx of anx/dep avh in [place]. on both sertraline and venlafaxine, was on Seroquel. Hx of HTN, GERD. On gabapentin 600 tid, sertraline 200 d, venla 150 d, quetiap 25 qhs. March 2021 labs: cbc shows low mch, high rdw; Cr 1.15 high, abnormal lipids, TSH and fT4 wnl. No OP head imaging, ekg.     Virtual visit via Zoom audio and video due to the COVID-19 pandemic. Patient is accepting of and agreeable to appointment. The appointment consisted of the patient and I only.     Interview:     Still having panic attacks once a week. Sx: soa, sweating, palpitations, tachycardia, fear, no derealization/depersonalization, last 30 min, leaves room where she was at. No triggers. Fear of another panic attack happening.     Anxiety due to 's health; admitted to hospital recently for dehydration. Notes uncontrolled worrying, muscle tension, irritability, restlessness, trouble sleeping - will wake at 3 am and can't go back to sleep. Duration is at least 6 months, since moved here.    Still having hallucinations every night before she goes to bed. Sees shadows walk past bed. No AH. Sometimes sees spiders during the day on the floor; happens once or twice a week.     Seroquel helped to some extent.     Again, patient is somewhat of a poor historian. Often times she will answer a question with \"no,\" and then contradict her self moments later.    Collateral from Meagan: patient did have a UTI (UA confirmed); was put on a course of abx. Feels her mom's confusion has improved since then.      Past Surgical History:  Past Surgical History:   Procedure Laterality Date   • CATARACT EXTRACTION  2002   • CHOLECYSTECTOMY OPEN  1971       Problem List:  Patient Active Problem List   Diagnosis   • Forgetfulness   • Anxiety   • " Depression   • Esophageal reflux   • Hallucinations   • Head injury   • Hypertension   • Other acute sinusitis   • PTSD (post-traumatic stress disorder)       Allergy:   Allergies   Allergen Reactions   • Floxin Otic [Ofloxacin] Rash        Discontinued Medications:  Medications Discontinued During This Encounter   Medication Reason   • sertraline (ZOLOFT) 100 MG tablet Not Efficacious       Current Medications:   Current Outpatient Medications   Medication Sig Dispense Refill   • aspirin (aspirin) 81 MG EC tablet aspirin 81 mg oral tablet,delayed release (DR/EC) take 1 tablet (81 mg) by oral route once daily   Active     • atorvastatin (LIPITOR) 40 MG tablet atorvastatin 40 mg oral tablet take 1 tablet (40 mg) by oral route once daily for 90 days 4/5/2021  Active     • clindamycin (CLEOCIN T) 1 % external solution      • ergocalciferol (ERGOCALCIFEROL) 1.25 MG (30546 UT) capsule Vitamin D2 1,250 mcg (50,000 unit) oral capsule take 1 capsule by oral route weekly   Active     • gabapentin (NEURONTIN) 600 MG tablet gabapentin 600 mg oral tablet take .5 tablet (300 mg) by oral route 2 times per day   Active     • levothyroxine (SYNTHROID, LEVOTHROID) 75 MCG tablet      • lisinopril (PRINIVIL,ZESTRIL) 10 MG tablet Take 10 mg by mouth Daily.     • metoprolol tartrate (LOPRESSOR) 25 MG tablet metoprolol tartrate 25 mg oral tablet take 2 tablets (50 mg) by oral route once daily   Active     • omeprazole (priLOSEC) 40 MG capsule omeprazole 40 mg oral capsule,delayed release(DR/EC) take 1 capsule (40 mg) by oral route once daily before a meal for 90 days 4/1/2021  Active     • nitrofurantoin, macrocrystal-monohydrate, (MACROBID) 100 MG capsule      • QUEtiapine (SEROquel) 25 MG tablet Take 2 tablets by mouth Every Night for 180 days. 120 tablet 2   • venlafaxine (EFFEXOR) 37.5 MG tablet Take 1 tablet by mouth Daily. 60 tablet 2     No current facility-administered medications for this visit.       Past Medical History:  Past  Medical History:   Diagnosis Date   • Acid reflux    • Allergies    • Anxiety    • Depression    • Forgetfulness    • Hallucinations 04/15/2021   • Head injury    • HTN (hypertension)    • PTSD (post-traumatic stress disorder)          Social History     Socioeconomic History   • Marital status: Unknown     Spouse name: Not on file   • Number of children: Not on file   • Years of education: Not on file   • Highest education level: Not on file   Tobacco Use   • Smoking status: Never Smoker   • Smokeless tobacco: Never Used   Vaping Use   • Vaping Use: Never used   Substance and Sexual Activity   • Alcohol use: Never   • Drug use: Never         Family History   Problem Relation Age of Onset   • Stroke Mother    • Heart disease Mother    • Stroke Father    • Heart disease Father    • Diabetes Father    • Stroke Sister    • Diabetes Sister    • Stroke Brother    • Diabetes Brother    • Diabetes Other         AUNT/UNCLE   • Anxiety disorder Other        Mental Status Exam:   Hygiene:   good  Cooperation:  Cooperative  Eye Contact:  Good  Psychomotor Behavior:  Appropriate  Affect:  Appropriate  Mood: anxious  Hopelessness: Denies  Speech:  Normal  Thought Process:  Goal directed  Thought Content:  Normal and Mood congruent  Suicidal:  None  Homicidal:  None  Hallucinations:  None, but has them at night and sometimes during the day  Delusion:  None  Memory:  Deficits  Orientation:  Person, Place, Time and Situation  Reliability:  fair  Insight:  Fair  Judgement:  Fair  Impulse Control:  Fair  Physical/Medical Issues:  No      Review of Systems:  Review of Systems   Constitutional: Positive for fatigue.   HENT: Negative for drooling.    Eyes: Positive for visual disturbance.   Respiratory: Negative for cough and shortness of breath.    Cardiovascular: Negative for chest pain, palpitations and leg swelling.   Gastrointestinal: Negative for nausea and vomiting.   Endocrine: Positive for heat intolerance. Negative for cold  intolerance.   Genitourinary: Negative for difficulty urinating.   Musculoskeletal: Negative for joint swelling.   Allergic/Immunologic: Negative for immunocompromised state.   Neurological: Positive for dizziness. Negative for seizures.   Hematological: Negative for adenopathy.         Physical Exam:  Physical Exam    Vital Signs:   There were no vitals taken for this visit.     Lab Results:   Conversion Encounter on 06/01/2021   Component Date Value Ref Range Status   • Leukocytes, UA 06/01/2021 Negative   Final   • Nitrite, UA 06/01/2021 Negative   Final   • Urobilinogen, UA 06/01/2021 0.2 E.U./dL   Final   • Protein, UA 06/01/2021 >=300 mg/dL   Final   • pH, UA 06/01/2021 5.5   Final   • Blood, UA 06/01/2021 Moderate   Final   • Specific Gravity, UA 06/01/2021 greater than 1.030   Final   • Ketones, UA 06/01/2021 Trace   Final   • Bilirubin, UA 06/01/2021 Small   Final   • Glucose, UA 06/01/2021 Negative   Final   • Appearance 06/01/2021 Slightly cloudy   Final   • Color, UA 06/01/2021 Orange   Final   Conversion Encounter on 03/30/2021   Component Date Value Ref Range Status   • THC, Screen 03/30/2021 Negative   Final   • Phencyclidine (PCP), Urine 03/30/2021 Negative   Final   • Oxycodone Screen, Urine 03/30/2021 Negative   Final   • Opiates 03/30/2021 Negative   Final   • MDMA URINE 03/30/2021 Negative   Final   • Amphet/Methamphet, Screen, Urine 03/30/2021 Negative   Final   • Methadone Screen, Urine 03/30/2021 Negative   Final   • Cocaine Screen, Urine 03/30/2021 Negative   Final   • Benzodiazepine Screen, Urine 03/30/2021 Positive   Final   • Barbiturates Screen, Urine 03/30/2021 Negative   Final   • Amphetamine, Urine 03/30/2021 Negative   Final       EKG Results:  No orders to display       Imaging Results:  No Images in the past 120 days found..      Assessment/Plan   Diagnoses and all orders for this visit:    1. Forgetfulness (Primary)    2. Hallucinations    3. Generalized anxiety disorder    4.  Dementia without behavioral disturbance, unspecified dementia type (CMS/HCC)    Other orders  -     venlafaxine (EFFEXOR) 37.5 MG tablet; Take 1 tablet by mouth Daily.  Dispense: 60 tablet; Refill: 2  -     QUEtiapine (SEROquel) 25 MG tablet; Take 2 tablets by mouth Every Night for 180 days.  Dispense: 120 tablet; Refill: 2        Presentation most consistent with developing dementia, likely Lewy body dementia.  Patient also reports worsening gait abnormalities; she fell in the summer of last year, injuring her head, and requiring stitches.      Patient also has a history of depression, possible PTSD by review of chart.  Patient did not mention PTSD during the interview, but she is not a very good historian.  Often times she will say no to a question, and then contradict herself only a few moments later.  She has some trouble with knowing her medications or her medical conditions.    Anxious and continued hallucinations. Switch sertraline to venlafaxine.  Increase Seroquel in the evenings.  See back in 6 weeks.  This was also discussed with Meagan, patient's daughter.    Caution again advised in terms of increasing the dose given the likely sensitivity she has to antipsychotics.    Psychotherapy is deferred at this time.    S/p referral to neurologist is appropriate for further management.     Visit Diagnoses:    ICD-10-CM ICD-9-CM   1. Forgetfulness  R68.89 780.99   2. Hallucinations  R44.3 780.1   3. Generalized anxiety disorder  F41.1 300.02   4. Dementia without behavioral disturbance, unspecified dementia type (CMS/HCC)  F03.90 294.20       PLAN:  1. Risk Assessment: Risk of self-harm acutely is low. Risk factors include anxiety disorder, depressive disorder, access to weapons, recent psychosocial stressors (pandemic). Protective factors include no family history, no present SI, no history of suicide attempts or self-harm in the past, no access to weapons, no AODA, healthcare seeking, future orientation,  willingness to engage in care. Risk of self-harm chronically is also low, but could be further elevated in the event of treatment noncompliance and/or AODA.  2. Safety: No acute safety concerns.  3. Medications:   a. INCREASE quetiapine from 25 mg to 50 mg nightly. Risks, benefits, alternatives discussed with patient including nausea and vomiting, GI upset, sedation, akathisia, hypotension, increased appetite, lowering of seizure threshold, theoretical risk of tardive dyskinesia, extrapyramidal symptoms, restless legs syndrome. After discussion of these risks and benefits, the patient voiced understanding and agreed to proceed.  b. REDUCE sertraline from 100 mg to 50 mg daily for 3 days, then DISCONTINUE.  c. START venlafaxine 37.5 mg PO QDAY. Risks, benefits, alternatives discussed with patient including GI upset, nausea vomiting diarrhea, theoretical decrease of seizure threshold predisposing the patient to a slightly higher seizure risk, headaches, sexual dysfunction, serotonin syndrome, bleeding risk, increased suicidality in patients 24 years and younger.  After discussion of these risks and benefits, the patient voiced understanding and agreed to proceed.  4. Therapy: Deferred.  5. Other: s/p referral to neurology for workup of likely dementia.  6. Follow Up: 6 wks.      TREATMENT PLAN/GOALS: Continue supportive psychotherapy efforts and medications as indicated. Treatment and medication options discussed during today's visit. Patient ackowledged and verbally consented to continue with current treatment plan and was educated on the importance of compliance with treatment and follow-up appointments.    MEDICATION ISSUES:  ERNESTINA reviewed as expected.  Discussed medication options and treatment plan of prescribed medication as well as the risks, benefits, and side effects including potential falls, possible impaired driving and metabolic adversities among others. Patient is agreeable to call the office with any  worsening of symptoms or onset of side effects. Patient is agreeable to call 911 or go to the nearest ER should he/she begin having SI/HI. No medication side effects or related complaints today.     MEDS ORDERED DURING VISIT:  New Medications Ordered This Visit   Medications   • venlafaxine (EFFEXOR) 37.5 MG tablet     Sig: Take 1 tablet by mouth Daily.     Dispense:  60 tablet     Refill:  2   • QUEtiapine (SEROquel) 25 MG tablet     Sig: Take 2 tablets by mouth Every Night for 180 days.     Dispense:  120 tablet     Refill:  2       Return in about 6 weeks (around 7/27/2021).         This document has been electronically signed by Riki Peterson MD  Haley 15, 2021 11:20 EDT      Part of this note may be an electronic transcription/translation of spoken language to printed text using the Dragon Dictation System.

## 2021-06-23 ENCOUNTER — HOSPITAL ENCOUNTER (OUTPATIENT)
Dept: MRI IMAGING | Facility: HOSPITAL | Age: 71
Discharge: HOME | End: 2021-06-23
Admitting: NURSE PRACTITIONER

## 2021-06-23 DIAGNOSIS — R44.3 HALLUCINATIONS: ICD-10-CM

## 2021-06-23 DIAGNOSIS — R51.9 FREQUENT HEADACHES: ICD-10-CM

## 2021-06-23 PROCEDURE — A9577 INJ MULTIHANCE: HCPCS | Performed by: NURSE PRACTITIONER

## 2021-06-23 PROCEDURE — 0 GADOBENATE DIMEGLUMINE 529 MG/ML SOLUTION: Performed by: NURSE PRACTITIONER

## 2021-06-23 PROCEDURE — 70552 MRI BRAIN STEM W/DYE: CPT

## 2021-06-23 RX ADMIN — GADOBENATE DIMEGLUMINE 15 ML: 529 INJECTION, SOLUTION INTRAVENOUS at 08:46

## 2021-07-15 VITALS
HEART RATE: 64 BPM | WEIGHT: 159 LBS | SYSTOLIC BLOOD PRESSURE: 176 MMHG | HEIGHT: 63 IN | DIASTOLIC BLOOD PRESSURE: 88 MMHG | TEMPERATURE: 99.5 F | OXYGEN SATURATION: 94 % | BODY MASS INDEX: 28.17 KG/M2

## 2021-07-15 VITALS — SYSTOLIC BLOOD PRESSURE: 178 MMHG | DIASTOLIC BLOOD PRESSURE: 84 MMHG

## 2021-07-27 ENCOUNTER — TELEMEDICINE (OUTPATIENT)
Dept: PSYCHIATRY | Facility: CLINIC | Age: 71
End: 2021-07-27

## 2021-07-27 DIAGNOSIS — R44.3 HALLUCINATIONS: ICD-10-CM

## 2021-07-27 DIAGNOSIS — R68.89 FORGETFULNESS: ICD-10-CM

## 2021-07-27 DIAGNOSIS — F41.1 GENERALIZED ANXIETY DISORDER: ICD-10-CM

## 2021-07-27 DIAGNOSIS — F03.90 DEMENTIA WITHOUT BEHAVIORAL DISTURBANCE, UNSPECIFIED DEMENTIA TYPE: Primary | ICD-10-CM

## 2021-07-27 PROCEDURE — 99214 OFFICE O/P EST MOD 30 MIN: CPT | Performed by: STUDENT IN AN ORGANIZED HEALTH CARE EDUCATION/TRAINING PROGRAM

## 2021-07-27 RX ORDER — VENLAFAXINE 37.5 MG/1
75 TABLET ORAL DAILY
Qty: 120 TABLET | Refills: 2 | Status: SHIPPED | OUTPATIENT
Start: 2021-07-27 | End: 2021-11-08 | Stop reason: SDUPTHER

## 2021-07-27 NOTE — PROGRESS NOTES
"Subjective   Payal Singer is a 71 y.o. female who presents today for follow up    Chief Complaint:  mdd meagan    History of Present Illness:     Chart review 4/15: Seen 3/30 to establish care. Hx of anx/dep avh in [place]. on both sertraline and venlafaxine, was on Seroquel. Hx of HTN, GERD. On gabapentin 600 tid, sertraline 200 d, venla 150 d, quetiap 25 qhs. March 2021 labs: cbc shows low mch, high rdw; Cr 1.15 high, abnormal lipids, TSH and fT4 wnl. No OP head imaging, ekg.     Virtual visit via Zoom audio and video due to the COVID-19 pandemic. Patient is accepting of and agreeable to appointment. The appointment consisted of the patient and I only.     7/27: Interview: \"Good\"   1. Having VH less frequently, once or twice a week. Like someone passes me real fast. Not experiencing spiders.  2. Sleeping well.  3.  doing better. Worrying is much better.  4. Medicine has been helpful.  5. Still feels depressed. Homesick for [place].   6. No panic attacks in last two weeks.  7. Much clearer today, linear. Better historian.  8. Meagan agrees.  9. No SI HI AVH.    Previous:  Still having panic attacks once a week. Sx: soa, sweating, palpitations, tachycardia, fear, no derealization/depersonalization, last 30 min, leaves room where she was at. No triggers. Fear of another panic attack happening.     Anxiety due to 's health; admitted to hospital recently for dehydration. Notes uncontrolled worrying, muscle tension, irritability, restlessness, trouble sleeping - will wake at 3 am and can't go back to sleep. Duration is at least 6 months, since moved here.    Still having hallucinations every night before she goes to bed. Sees shadows walk past bed. No AH. Sometimes sees spiders during the day on the floor; happens once or twice a week.     Seroquel helped to some extent.     Again, patient is somewhat of a poor historian. Often times she will answer a question with \"no,\" and then contradict her self moments " later.    Collateral from Meagan: patient did have a UTI (UA confirmed); was put on a course of abx. Feels her mom's confusion has improved since then.      Past Surgical History:  Past Surgical History:   Procedure Laterality Date   • CATARACT EXTRACTION  2002   • CHOLECYSTECTOMY OPEN  1971       Problem List:  Patient Active Problem List   Diagnosis   • Forgetfulness   • Anxiety   • Depression   • Esophageal reflux   • Hallucinations   • Head injury   • Hypertension   • Other acute sinusitis   • PTSD (post-traumatic stress disorder)       Allergy:   Allergies   Allergen Reactions   • Floxin Otic [Ofloxacin] Rash        Discontinued Medications:  Medications Discontinued During This Encounter   Medication Reason   • venlafaxine (EFFEXOR) 37.5 MG tablet Reorder       Current Medications:   Current Outpatient Medications   Medication Sig Dispense Refill   • aspirin (aspirin) 81 MG EC tablet aspirin 81 mg oral tablet,delayed release (DR/EC) take 1 tablet (81 mg) by oral route once daily   Active     • atorvastatin (LIPITOR) 40 MG tablet atorvastatin 40 mg oral tablet take 1 tablet (40 mg) by oral route once daily for 90 days 4/5/2021  Active     • clindamycin (CLEOCIN T) 1 % external solution      • ergocalciferol (ERGOCALCIFEROL) 1.25 MG (94975 UT) capsule Vitamin D2 1,250 mcg (50,000 unit) oral capsule take 1 capsule by oral route weekly   Active     • gabapentin (NEURONTIN) 600 MG tablet gabapentin 600 mg oral tablet take .5 tablet (300 mg) by oral route 2 times per day   Active     • levothyroxine (SYNTHROID, LEVOTHROID) 75 MCG tablet      • lisinopril (PRINIVIL,ZESTRIL) 10 MG tablet Take 10 mg by mouth Daily.     • metoprolol tartrate (LOPRESSOR) 25 MG tablet metoprolol tartrate 25 mg oral tablet take 2 tablets (50 mg) by oral route once daily   Active     • omeprazole (priLOSEC) 40 MG capsule omeprazole 40 mg oral capsule,delayed release(DR/EC) take 1 capsule (40 mg) by oral route once daily before a meal for 90  "days 4/1/2021  Active     • QUEtiapine (SEROquel) 25 MG tablet Take 2 tablets by mouth Every Night for 180 days. 120 tablet 2   • venlafaxine (EFFEXOR) 37.5 MG tablet Take 2 tablets by mouth Daily for 180 days. 120 tablet 2   • nitrofurantoin, macrocrystal-monohydrate, (MACROBID) 100 MG capsule        No current facility-administered medications for this visit.       Past Medical History:  Past Medical History:   Diagnosis Date   • Acid reflux    • Allergies    • Anxiety    • Depression    • Forgetfulness    • Hallucinations 04/15/2021   • Head injury    • HTN (hypertension)    • PTSD (post-traumatic stress disorder)          Social History     Socioeconomic History   • Marital status: Unknown     Spouse name: Not on file   • Number of children: Not on file   • Years of education: Not on file   • Highest education level: Not on file   Tobacco Use   • Smoking status: Never Smoker   • Smokeless tobacco: Never Used   Vaping Use   • Vaping Use: Never used   Substance and Sexual Activity   • Alcohol use: Never   • Drug use: Never   • Sexual activity: Not Currently         Family History   Problem Relation Age of Onset   • Stroke Mother    • Heart disease Mother    • Stroke Father    • Heart disease Father    • Diabetes Father    • Stroke Sister    • Diabetes Sister    • Stroke Brother    • Diabetes Brother    • Diabetes Other         AUNT/UNCLE   • Anxiety disorder Other        Mental Status Exam:   Hygiene:   good  Cooperation:  Cooperative  Eye Contact:  Good  Psychomotor Behavior:  Appropriate  Affect:  Appropriate, mood congruent, almost euthymic  Mood: \"Good\"  Hopelessness: Denies  Speech:  Normal  Thought Process:  Goal directed  Thought Content:  Normal and Mood congruent  Suicidal:  None  Homicidal:  None  Hallucinations:  None, but has them at night and sometimes during the day, these have improved markedly  Delusion:  None  Memory:  Deficits  Orientation:  Grossly intact  Reliability:  fair  Insight:  " Fair  Judgement:  Fair  Impulse Control:  Fair  Physical/Medical Issues:  No      Review of Systems:  Review of Systems   Constitutional: Negative for fatigue.   HENT: Negative for drooling.    Eyes: Negative for visual disturbance.   Respiratory: Negative for cough and shortness of breath.    Cardiovascular: Negative for chest pain, palpitations and leg swelling.   Gastrointestinal: Negative for nausea and vomiting.   Endocrine: Positive for heat intolerance. Negative for cold intolerance.   Genitourinary: Negative for difficulty urinating.   Musculoskeletal: Negative for joint swelling.   Allergic/Immunologic: Negative for immunocompromised state.   Neurological: Positive for dizziness. Negative for seizures.   Hematological: Negative for adenopathy.         Physical Exam:  Physical Exam    Vital Signs:   There were no vitals taken for this visit.     Lab Results:   Conversion Encounter on 06/01/2021   Component Date Value Ref Range Status   • Leukocytes, UA 06/01/2021 Negative   Final   • Nitrite, UA 06/01/2021 Negative   Final   • Urobilinogen, UA 06/01/2021 0.2 E.U./dL   Final   • Protein, UA 06/01/2021 >=300 mg/dL   Final   • pH, UA 06/01/2021 5.5   Final   • Blood, UA 06/01/2021 Moderate   Final   • Specific Gravity, UA 06/01/2021 greater than 1.030   Final   • Ketones, UA 06/01/2021 Trace   Final   • Bilirubin, UA 06/01/2021 Small   Final   • Glucose, UA 06/01/2021 Negative   Final   • Appearance 06/01/2021 Slightly cloudy   Final   • Color, UA 06/01/2021 Orange   Final   Conversion Encounter on 03/30/2021   Component Date Value Ref Range Status   • THC, Screen 03/30/2021 Negative   Final   • Phencyclidine (PCP), Urine 03/30/2021 Negative   Final   • Oxycodone Screen, Urine 03/30/2021 Negative   Final   • Opiates 03/30/2021 Negative   Final   • MDMA URINE 03/30/2021 Negative   Final   • Amphet/Methamphet, Screen, Urine 03/30/2021 Negative   Final   • Methadone Screen, Urine 03/30/2021 Negative   Final   •  Cocaine Screen, Urine 03/30/2021 Negative   Final   • Benzodiazepine Screen, Urine 03/30/2021 Positive   Final   • Barbiturates Screen, Urine 03/30/2021 Negative   Final   • Amphetamine, Urine 03/30/2021 Negative   Final       EKG Results:  No orders to display       Imaging Results:  No Images in the past 120 days found..      Assessment/Plan   Diagnoses and all orders for this visit:    1. Dementia without behavioral disturbance, unspecified dementia type (CMS/HCC) (Primary)    2. Generalized anxiety disorder    3. Hallucinations    4. Forgetfulness    Other orders  -     venlafaxine (EFFEXOR) 37.5 MG tablet; Take 2 tablets by mouth Daily for 180 days.  Dispense: 120 tablet; Refill: 2        Presentation most consistent with developing dementia, likely Lewy body dementia.  Patient also reports worsening gait abnormalities; she fell in the summer of last year, injuring her head, and requiring stitches.      Patient also has a history of depression, possible PTSD by review of chart.  Patient did not mention PTSD during the interview, but she is not a very good historian.  Often times she will say no to a question, and then contradict herself only a few moments later.  She has some trouble with knowing her medications or her medical conditions.      7/27: Markedly improved, and far more linear.  Still has residual anxiety and depression.  Hallucinations are still present but they do not cause distress at all to the patient.  Continue Seroquel at the present dose.  Double the dose of venlafaxine. See back in 6 weeks.  This was also discussed with Meagan, patient's daughter.    Previous:  Caution again advised in terms of increasing the dose given the likely sensitivity she has to antipsychotics.    Psychotherapy is deferred at this time.    S/p referral to neurologist is appropriate for further management.     Visit Diagnoses:    ICD-10-CM ICD-9-CM   1. Dementia without behavioral disturbance, unspecified dementia  type (CMS/[facility])  F03.90 294.20   2. Generalized anxiety disorder  F41.1 300.02   3. Hallucinations  R44.3 780.1   4. Forgetfulness  R68.89 780.99       PLAN:  1. Risk Assessment: Risk of self-harm acutely is low. Risk factors include anxiety disorder, depressive disorder, access to weapons, recent psychosocial stressors (pandemic). Protective factors include no family history, no present SI, no history of suicide attempts or self-harm in the past, no access to weapons, no AODA, healthcare seeking, future orientation, willingness to engage in care. Risk of self-harm chronically is also low, but could be further elevated in the event of treatment noncompliance and/or AODA.  2. Safety: No acute safety concerns.  3. Medications:   a. CONTINUE quetiapine 50 mg nightly. Risks, benefits, alternatives discussed with patient including nausea and vomiting, GI upset, sedation, akathisia, hypotension, increased appetite, lowering of seizure threshold, theoretical risk of tardive dyskinesia, extrapyramidal symptoms, restless legs syndrome. After discussion of these risks and benefits, the patient voiced understanding and agreed to proceed.  b. STATUS POST sertraline 100 mg daily.  c. INCREASE venlafaxine 37.5 to 75 mg PO QDAY. Risks, benefits, alternatives discussed with patient including GI upset, nausea vomiting diarrhea, theoretical decrease of seizure threshold predisposing the patient to a slightly higher seizure risk, headaches, sexual dysfunction, serotonin syndrome, bleeding risk, increased suicidality in patients 24 years and younger.  After discussion of these risks and benefits, the patient voiced understanding and agreed to proceed.  4. Therapy: Deferred.  5. Other: s/p referral to neurology for workup of likely dementia.  6. Follow Up: 6 wks.      TREATMENT PLAN/GOALS: Continue supportive psychotherapy efforts and medications as indicated. Treatment and medication options discussed during today's visit. Patient  ackowledged and verbally consented to continue with current treatment plan and was educated on the importance of compliance with treatment and follow-up appointments.    MEDICATION ISSUES:  ERNESTINA reviewed as expected.  Discussed medication options and treatment plan of prescribed medication as well as the risks, benefits, and side effects including potential falls, possible impaired driving and metabolic adversities among others. Patient is agreeable to call the office with any worsening of symptoms or onset of side effects. Patient is agreeable to call 911 or go to the nearest ER should he/she begin having SI/HI. No medication side effects or related complaints today.     MEDS ORDERED DURING VISIT:  New Medications Ordered This Visit   Medications   • venlafaxine (EFFEXOR) 37.5 MG tablet     Sig: Take 2 tablets by mouth Daily for 180 days.     Dispense:  120 tablet     Refill:  2       Return in about 6 weeks (around 9/7/2021).         This document has been electronically signed by Riki Peterson MD  July 27, 2021 11:38 EDT      Part of this note may be an electronic transcription/translation of spoken language to printed text using the Dragon Dictation System.

## 2021-07-27 NOTE — PATIENT INSTRUCTIONS
1.  Please return to clinic at your next scheduled visit.  Contact the clinic ([phone]) at least 24 hours prior in the event you need to cancel.  2.  Do no harm to yourself or others.    3.  Avoid alcohol and drugs.    4.  Take all medications as prescribed.  Please contact the clinic with any concerns. If you are in need of medication refills, please call the clinic at [phone].    5. Should you want to get in touch with your provider, Dr. Riki Peterson, please utilize [employer] or contact the office ([phone]), and staff will be able to page Dr. Peterson directly.  6.  In the event you have personal crisis, contact the following crisis numbers: Suicide Prevention Hotline 1-346.866.9088; [facility] Helpline 1-371-444-TFUR; [facility] Emergency Room [phone]; text HELLO to 341288; or 366.     SPECIFIC RECOMMENDATIONS:     1.      Medications discussed at this encounter:                   -Increase venlafaxine to 75 mg p.o. daily.     2.      Psychotherapy recommendations: Deferred     3.     Return to clinic:  6 weeks

## 2021-08-13 RX ORDER — ERGOCALCIFEROL 1.25 MG/1
50000 CAPSULE ORAL WEEKLY
Qty: 13 CAPSULE | Refills: 0 | Status: SHIPPED | OUTPATIENT
Start: 2021-08-13 | End: 2021-10-08 | Stop reason: SDUPTHER

## 2021-08-13 RX ORDER — ATORVASTATIN CALCIUM 40 MG/1
40 TABLET, FILM COATED ORAL DAILY
Qty: 90 TABLET | Refills: 0 | Status: SHIPPED | OUTPATIENT
Start: 2021-08-13 | End: 2021-10-08 | Stop reason: SDUPTHER

## 2021-08-13 NOTE — TELEPHONE ENCOUNTER
Caller: Timabraulio Payal    Relationship: Self    Best call back number: [phone]     Medication needed:   Requested Prescriptions     Pending Prescriptions Disp Refills   • atorvastatin (LIPITOR) 40 MG tablet     • ergocalciferol (ERGOCALCIFEROL) 1.25 MG (87235 UT) capsule         When do you need the refill by: ASAP    What additional details did the patient provide when requesting the medication: PATIENT IS OUT OF THESE MEDICATIONS    Does the patient have less than a 3 day supply:  [x] Yes  [] No    What is the patient's preferred pharmacy: [facility], [address] - [phone]  - [phone] FX

## 2021-08-13 NOTE — TELEPHONE ENCOUNTER
Pt is requesting a refill on Vitamin D, we have not prescribed this and there are no labs done for this either. Please advise.

## 2021-08-24 ENCOUNTER — TELEPHONE (OUTPATIENT)
Dept: INTERNAL MEDICINE | Facility: CLINIC | Age: 71
End: 2021-08-24

## 2021-08-24 NOTE — TELEPHONE ENCOUNTER
Caller: NILDA    Relationship: DAUGHTER  Best call back number: [phone] REQUESTING CALL BACK IF REFILLED OR NOT AND REQUEST TO DISCUSS RELATED LABS TO MEDICATION NEED    Medication needed:   Requested Prescriptions      No prescriptions requested or ordered in this encounter   VITAMIN D2    When do you need the refill by: ASAP    What additional details did the patient provide when requesting the medication: NILDA STATED: THAT THIS WAS BEING LOOKED INTO A FEW WEEKS AGO BASED ON SOME LABS THAT WERE RECENTLY DONE.     Does the patient have less than a 3 day supply:  [] Yes  [x] No    What is the patient's preferred pharmacy: [facility], [address] - [phone]  - [phone] FX

## 2021-09-07 ENCOUNTER — TELEMEDICINE (OUTPATIENT)
Dept: PSYCHIATRY | Facility: CLINIC | Age: 71
End: 2021-09-07

## 2021-09-07 DIAGNOSIS — R44.3 HALLUCINATIONS: ICD-10-CM

## 2021-09-07 DIAGNOSIS — F03.90 DEMENTIA WITHOUT BEHAVIORAL DISTURBANCE, UNSPECIFIED DEMENTIA TYPE: Primary | ICD-10-CM

## 2021-09-07 DIAGNOSIS — F41.1 GENERALIZED ANXIETY DISORDER: ICD-10-CM

## 2021-09-07 PROCEDURE — 99214 OFFICE O/P EST MOD 30 MIN: CPT | Performed by: STUDENT IN AN ORGANIZED HEALTH CARE EDUCATION/TRAINING PROGRAM

## 2021-09-07 NOTE — PROGRESS NOTES
"Subjective   Payal Singer is a 71 y.o. female who presents today for follow up    Chief Complaint:  mdd meagan    History of Present Illness:     Chart review 4/15: Seen 3/30 to establish care. Hx of anx/dep avh in [place]. on both sertraline and venlafaxine, was on Seroquel. Hx of HTN, GERD. On gabapentin 600 tid, sertraline 200 d, venla 150 d, quetiap 25 qhs. March 2021 labs: cbc shows low mch, high rdw; Cr 1.15 high, abnormal lipids, TSH and fT4 wnl. No OP head imaging, ekg.     \"Meagan and Payal\"    9/7: Virtual visit via Zoom audio and video due to the COVID-19 pandemic.  Patient is accepting of and agreeable to visit.  The visit consisted of the patient and I.  Interview:  1. Records review shows no new developments.  2. \"Everything's pretty good.\"  3. No hallucinations at all. Might see a shadow.  4. Depression better.  5.  doing better, but has dizziness.  6. Meagan in agreement.  7. No SI HI AVH.      7/27: Virtual visit via Zoom audio and video due to the COVID-19 pandemic. Patient is accepting of and agreeable to appointment. The appointment consisted of the patient and I only.   Interview: \"Good\"   1. Having VH less frequently, once or twice a week. Like someone passes me real fast. Not experiencing spiders.  2. Sleeping well.  3.  doing better. Worrying is much better.  4. Medicine has been helpful.  5. Still feels depressed. Homesick for [place].   6. No panic attacks in last two weeks.  7. Much clearer today, linear. Better historian.  8. Meagan agrees.  9. No SI HI AVH.    Previous:  Still having panic attacks once a week. Sx: soa, sweating, palpitations, tachycardia, fear, no derealization/depersonalization, last 30 min, leaves room where she was at. No triggers. Fear of another panic attack happening.     Anxiety due to 's health; admitted to hospital recently for dehydration. Notes uncontrolled worrying, muscle tension, irritability, restlessness, trouble sleeping - will wake at 3 am " "and can't go back to sleep. Duration is at least 6 months, since moved here.    Still having hallucinations every night before she goes to bed. Sees shadows walk past bed. No AH. Sometimes sees spiders during the day on the floor; happens once or twice a week.     Seroquel helped to some extent.     Again, patient is somewhat of a poor historian. Often times she will answer a question with \"no,\" and then contradict her self moments later.    Collateral from Meagan: patient did have a UTI (UA confirmed); was put on a course of abx. Feels her mom's confusion has improved since then.      Past Surgical History:  Past Surgical History:   Procedure Laterality Date   • CATARACT EXTRACTION  2002   • CHOLECYSTECTOMY OPEN  1971       Problem List:  Patient Active Problem List   Diagnosis   • Forgetfulness   • Anxiety   • Depression   • Esophageal reflux   • Hallucinations   • Head injury   • Hypertension   • Other acute sinusitis   • PTSD (post-traumatic stress disorder)       Allergy:   Allergies   Allergen Reactions   • Floxin Otic [Ofloxacin] Rash        Discontinued Medications:  There are no discontinued medications.    Current Medications:   Current Outpatient Medications   Medication Sig Dispense Refill   • aspirin (aspirin) 81 MG EC tablet aspirin 81 mg oral tablet,delayed release (DR/EC) take 1 tablet (81 mg) by oral route once daily   Active     • atorvastatin (LIPITOR) 40 MG tablet Take 1 tablet by mouth Daily. 90 tablet 0   • clindamycin (CLEOCIN T) 1 % external solution      • ergocalciferol (ERGOCALCIFEROL) 1.25 MG (57604 UT) capsule Take 1 capsule by mouth 1 (One) Time Per Week. 13 capsule 0   • gabapentin (NEURONTIN) 600 MG tablet gabapentin 600 mg oral tablet take .5 tablet (300 mg) by oral route 2 times per day   Active     • levothyroxine (SYNTHROID, LEVOTHROID) 75 MCG tablet      • lisinopril (PRINIVIL,ZESTRIL) 10 MG tablet Take 10 mg by mouth Daily.     • metoprolol tartrate (LOPRESSOR) 25 MG tablet " "metoprolol tartrate 25 mg oral tablet take 2 tablets (50 mg) by oral route once daily   Active     • nitrofurantoin, macrocrystal-monohydrate, (MACROBID) 100 MG capsule      • omeprazole (priLOSEC) 40 MG capsule omeprazole 40 mg oral capsule,delayed release(DR/EC) take 1 capsule (40 mg) by oral route once daily before a meal for 90 days 4/1/2021  Active     • QUEtiapine (SEROquel) 25 MG tablet Take 2 tablets by mouth Every Night for 180 days. 120 tablet 2   • venlafaxine (EFFEXOR) 37.5 MG tablet Take 2 tablets by mouth Daily for 180 days. 120 tablet 2     No current facility-administered medications for this visit.       Past Medical History:  Past Medical History:   Diagnosis Date   • Acid reflux    • Allergies    • Anxiety    • Depression    • Forgetfulness    • Hallucinations 04/15/2021   • Head injury    • HTN (hypertension)    • PTSD (post-traumatic stress disorder)          Social History     Socioeconomic History   • Marital status: Unknown     Spouse name: Not on file   • Number of children: Not on file   • Years of education: Not on file   • Highest education level: Not on file   Tobacco Use   • Smoking status: Never Smoker   • Smokeless tobacco: Never Used   Vaping Use   • Vaping Use: Never used   Substance and Sexual Activity   • Alcohol use: Never   • Drug use: Never   • Sexual activity: Not Currently         Family History   Problem Relation Age of Onset   • Stroke Mother    • Heart disease Mother    • Stroke Father    • Heart disease Father    • Diabetes Father    • Stroke Sister    • Diabetes Sister    • Stroke Brother    • Diabetes Brother    • Diabetes Other         AUNT/UNCLE   • Anxiety disorder Other        Mental Status Exam:   Hygiene:   good  Cooperation:  Cooperative  Eye Contact:  Good  Psychomotor Behavior:  Appropriate  Affect:  Appropriate, mood congruent, euthymic  Mood: \"everything's pretty good\"  Hopelessness: Denies  Speech:  Normal  Thought Process:  Goal directed  Thought Content:  " Normal and Mood congruent  Suicidal:  None  Homicidal:  None  Hallucinations:  None  Delusion:  None  Memory:  Deficits  Orientation:  Grossly intact  Reliability:  fair  Insight:  Fair  Judgement:  Fair  Impulse Control:  Fair  Physical/Medical Issues:  No      Review of Systems:  Review of Systems   Constitutional: Positive for fatigue. Negative for diaphoresis.   HENT: Negative for drooling.    Eyes: Negative for visual disturbance.   Respiratory: Negative for cough and shortness of breath.    Cardiovascular: Negative for chest pain, palpitations and leg swelling.   Gastrointestinal: Negative for nausea and vomiting.   Endocrine: Positive for heat intolerance. Negative for cold intolerance.   Genitourinary: Negative for difficulty urinating.   Musculoskeletal: Negative for joint swelling.   Allergic/Immunologic: Negative for immunocompromised state.   Neurological: Negative for dizziness and seizures.   Hematological: Negative for adenopathy.         Physical Exam:  Physical Exam    Vital Signs:   There were no vitals taken for this visit.     Lab Results:   Conversion Encounter on 06/01/2021   Component Date Value Ref Range Status   • Leukocytes, UA 06/01/2021 Negative   Final   • Nitrite, UA 06/01/2021 Negative   Final   • Urobilinogen, UA 06/01/2021 0.2 E.U./dL   Final   • Protein, UA 06/01/2021 >=300 mg/dL   Final   • pH, UA 06/01/2021 5.5   Final   • Blood, UA 06/01/2021 Moderate   Final   • Specific Gravity, UA 06/01/2021 greater than 1.030   Final   • Ketones, UA 06/01/2021 Trace   Final   • Bilirubin, UA 06/01/2021 Small   Final   • Glucose, UA 06/01/2021 Negative   Final   • Appearance 06/01/2021 Slightly cloudy   Final   • Color, UA 06/01/2021 Orange   Final   Conversion Encounter on 03/30/2021   Component Date Value Ref Range Status   • THC, Screen 03/30/2021 Negative   Final   • Phencyclidine (PCP), Urine 03/30/2021 Negative   Final   • Oxycodone Screen, Urine 03/30/2021 Negative   Final   • Opiates  03/30/2021 Negative   Final   • MDMA URINE 03/30/2021 Negative   Final   • Amphet/Methamphet, Screen, Urine 03/30/2021 Negative   Final   • Methadone Screen, Urine 03/30/2021 Negative   Final   • Cocaine Screen, Urine 03/30/2021 Negative   Final   • Benzodiazepine Screen, Urine 03/30/2021 Positive   Final   • Barbiturates Screen, Urine 03/30/2021 Negative   Final   • Amphetamine, Urine 03/30/2021 Negative   Final       EKG Results:  No orders to display       Imaging Results:  No Images in the past 120 days found..      Assessment/Plan   Diagnoses and all orders for this visit:    1. Dementia without behavioral disturbance, unspecified dementia type (CMS/HCC) (Primary)    2. Generalized anxiety disorder    3. Hallucinations        Presentation most consistent with developing dementia, likely Lewy body dementia.  Patient also reports worsening gait abnormalities; she fell in the summer of last year, injuring her head, and requiring stitches.      Patient also has a history of depression, possible PTSD by review of chart.  Patient did not mention PTSD during the interview, but she is not a very good historian.  Often times she will say no to a question, and then contradict herself only a few moments later.  She has some trouble with knowing her medications or her medical conditions.    9/7: Doing even better.  8 weeks.  No changes.    7/27: Markedly improved, and far more linear.  Still has residual anxiety and depression.  Hallucinations are still present but they do not cause distress at all to the patient.  Continue Seroquel at the present dose.  Double the dose of venlafaxine. See back in 6 weeks.  This was also discussed with Meagan, patient's daughter.    Previous:  Caution again advised in terms of increasing the dose given the likely sensitivity she has to antipsychotics.    Psychotherapy is deferred at this time.    S/p referral to neurologist is appropriate for further management.     Visit Diagnoses:     ICD-10-CM ICD-9-CM   1. Dementia without behavioral disturbance, unspecified dementia type (CMS/[facility])  F03.90 294.20   2. Generalized anxiety disorder  F41.1 300.02   3. Hallucinations  R44.3 780.1       PLAN:  8. Risk Assessment: Risk of self-harm acutely is low. Risk factors include anxiety disorder, depressive disorder, access to weapons, recent psychosocial stressors (pandemic). Protective factors include no family history, no present SI, no history of suicide attempts or self-harm in the past, no access to weapons, no AODA, healthcare seeking, future orientation, willingness to engage in care. Risk of self-harm chronically is also low, but could be further elevated in the event of treatment noncompliance and/or AODA.  9. Safety: No acute safety concerns.  10. Medications:   a. CONTINUE quetiapine 50 mg nightly. Risks, benefits, alternatives discussed with patient including nausea and vomiting, GI upset, sedation, akathisia, hypotension, increased appetite, lowering of seizure threshold, theoretical risk of tardive dyskinesia, extrapyramidal symptoms, restless legs syndrome. After discussion of these risks and benefits, the patient voiced understanding and agreed to proceed.  b. STATUS POST sertraline 100 mg daily.  c. CONTINUE venlafaxine 75 mg PO QDAY. Risks, benefits, alternatives discussed with patient including GI upset, nausea vomiting diarrhea, theoretical decrease of seizure threshold predisposing the patient to a slightly higher seizure risk, headaches, sexual dysfunction, serotonin syndrome, bleeding risk, increased suicidality in patients 24 years and younger.  After discussion of these risks and benefits, the patient voiced understanding and agreed to proceed.  11. Therapy: Deferred.  12. Other: s/p referral to neurology for workup of likely dementia.  13. Follow Up: 8 wks.      TREATMENT PLAN/GOALS: Continue supportive psychotherapy efforts and medications as indicated. Treatment and medication  options discussed during today's visit. Patient ackowledged and verbally consented to continue with current treatment plan and was educated on the importance of compliance with treatment and follow-up appointments.    MEDICATION ISSUES:  ERNESTINA reviewed as expected.  Discussed medication options and treatment plan of prescribed medication as well as the risks, benefits, and side effects including potential falls, possible impaired driving and metabolic adversities among others. Patient is agreeable to call the office with any worsening of symptoms or onset of side effects. Patient is agreeable to call 911 or go to the nearest ER should he/she begin having SI/HI. No medication side effects or related complaints today.     MEDS ORDERED DURING VISIT:  No orders of the defined types were placed in this encounter.      Return in about 8 weeks (around 11/2/2021).         This document has been electronically signed by Riki Peterson MD  September 7, 2021 10:47 EDT      Part of this note may be an electronic transcription/translation of spoken language to printed text using the Dragon Dictation System.

## 2021-09-07 NOTE — PATIENT INSTRUCTIONS
1.  Please return to clinic at your next scheduled visit.  Contact the clinic ([phone]) at least 24 hours prior in the event you need to cancel.  2.  Do no harm to yourself or others.    3.  Avoid alcohol and drugs.    4.  Take all medications as prescribed.  Please contact the clinic with any concerns. If you are in need of medication refills, please call the clinic at [phone].    5. Should you want to get in touch with your provider, Dr. Riki Peterson, please utilize [employer] or contact the office ([phone]), and staff will be able to page Dr. Peterson directly.  6.  In the event you have personal crisis, contact the following crisis numbers: Suicide Prevention Hotline 1-176.321.8729; [facility] Helpline 3-387-513-PXFX; [facility] Emergency Room [phone]; text HELLO to 571224; or 409.     SPECIFIC RECOMMENDATIONS:     1.      Medications discussed at this encounter:                   -No changes     2.      Psychotherapy recommendations:      3.     Return to clinic: 8 weeks

## 2021-09-08 RX ORDER — LEVOTHYROXINE SODIUM 0.07 MG/1
75 TABLET ORAL DAILY
Qty: 30 TABLET | Refills: 0 | Status: SHIPPED | OUTPATIENT
Start: 2021-09-08 | End: 2021-10-08 | Stop reason: SDUPTHER

## 2021-09-08 RX ORDER — LISINOPRIL 10 MG/1
10 TABLET ORAL DAILY
Qty: 30 TABLET | Refills: 0 | Status: SHIPPED | OUTPATIENT
Start: 2021-09-08 | End: 2021-10-06

## 2021-09-28 ENCOUNTER — APPOINTMENT (OUTPATIENT)
Dept: MAMMOGRAPHY | Facility: HOSPITAL | Age: 71
End: 2021-09-28

## 2021-09-29 RX ORDER — LEVOTHYROXINE SODIUM 0.07 MG/1
TABLET ORAL
Qty: 30 TABLET | Refills: 0 | OUTPATIENT
Start: 2021-09-29

## 2021-09-29 RX ORDER — LISINOPRIL 10 MG/1
TABLET ORAL
Qty: 30 TABLET | Refills: 0 | OUTPATIENT
Start: 2021-09-29

## 2021-10-06 RX ORDER — LISINOPRIL 10 MG/1
TABLET ORAL
Qty: 30 TABLET | Refills: 0 | Status: SHIPPED | OUTPATIENT
Start: 2021-10-06 | End: 2021-10-08 | Stop reason: SDUPTHER

## 2021-10-07 DIAGNOSIS — R44.3 HALLUCINATIONS: Primary | ICD-10-CM

## 2021-10-08 ENCOUNTER — OFFICE VISIT (OUTPATIENT)
Dept: INTERNAL MEDICINE | Facility: CLINIC | Age: 71
End: 2021-10-08

## 2021-10-08 VITALS
OXYGEN SATURATION: 94 % | DIASTOLIC BLOOD PRESSURE: 92 MMHG | HEART RATE: 83 BPM | SYSTOLIC BLOOD PRESSURE: 166 MMHG | WEIGHT: 159 LBS | BODY MASS INDEX: 28.17 KG/M2 | TEMPERATURE: 98.5 F | HEIGHT: 63 IN

## 2021-10-08 DIAGNOSIS — E03.9 ACQUIRED HYPOTHYROIDISM: ICD-10-CM

## 2021-10-08 DIAGNOSIS — I10 PRIMARY HYPERTENSION: Primary | ICD-10-CM

## 2021-10-08 DIAGNOSIS — E78.2 MIXED HYPERLIPIDEMIA: ICD-10-CM

## 2021-10-08 DIAGNOSIS — Z78.0 POSTMENOPAUSE: ICD-10-CM

## 2021-10-08 DIAGNOSIS — E55.9 VITAMIN D DEFICIENCY: Chronic | ICD-10-CM

## 2021-10-08 LAB
ALBUMIN SERPL-MCNC: 4.1 G/DL (ref 3.5–5.2)
ALBUMIN/GLOB SERPL: 1.5 G/DL
ALP SERPL-CCNC: 114 U/L (ref 39–117)
ALT SERPL W P-5'-P-CCNC: 9 U/L (ref 1–33)
ANION GAP SERPL CALCULATED.3IONS-SCNC: 9.6 MMOL/L (ref 5–15)
AST SERPL-CCNC: 17 U/L (ref 1–32)
BASOPHILS # BLD AUTO: 0.03 10*3/MM3 (ref 0–0.2)
BASOPHILS NFR BLD AUTO: 0.5 % (ref 0–1.5)
BILIRUB SERPL-MCNC: 0.3 MG/DL (ref 0–1.2)
BUN SERPL-MCNC: 19 MG/DL (ref 8–23)
BUN/CREAT SERPL: 19 (ref 7–25)
CALCIUM SPEC-SCNC: 9.2 MG/DL (ref 8.6–10.5)
CHLORIDE SERPL-SCNC: 105 MMOL/L (ref 98–107)
CHOLEST SERPL-MCNC: 141 MG/DL (ref 0–200)
CO2 SERPL-SCNC: 29.4 MMOL/L (ref 22–29)
CREAT SERPL-MCNC: 1 MG/DL (ref 0.57–1)
DEPRECATED RDW RBC AUTO: 44.5 FL (ref 37–54)
EOSINOPHIL # BLD AUTO: 0.13 10*3/MM3 (ref 0–0.4)
EOSINOPHIL NFR BLD AUTO: 2.2 % (ref 0.3–6.2)
ERYTHROCYTE [DISTWIDTH] IN BLOOD BY AUTOMATED COUNT: 14.8 % (ref 12.3–15.4)
GFR SERPL CREATININE-BSD FRML MDRD: 55 ML/MIN/1.73
GFR SERPL CREATININE-BSD FRML MDRD: 66 ML/MIN/1.73
GLOBULIN UR ELPH-MCNC: 2.8 GM/DL
GLUCOSE SERPL-MCNC: 81 MG/DL (ref 65–99)
HCT VFR BLD AUTO: 39 % (ref 34–46.6)
HDLC SERPL-MCNC: 38 MG/DL (ref 40–60)
HGB BLD-MCNC: 12.5 G/DL (ref 12–15.9)
IMM GRANULOCYTES # BLD AUTO: 0.03 10*3/MM3 (ref 0–0.05)
IMM GRANULOCYTES NFR BLD AUTO: 0.5 % (ref 0–0.5)
LDLC SERPL CALC-MCNC: 76 MG/DL (ref 0–100)
LDLC/HDLC SERPL: 1.88 {RATIO}
LYMPHOCYTES # BLD AUTO: 0.81 10*3/MM3 (ref 0.7–3.1)
LYMPHOCYTES NFR BLD AUTO: 13.7 % (ref 19.6–45.3)
MCH RBC QN AUTO: 26.7 PG (ref 26.6–33)
MCHC RBC AUTO-ENTMCNC: 32.1 G/DL (ref 31.5–35.7)
MCV RBC AUTO: 83.3 FL (ref 79–97)
MONOCYTES # BLD AUTO: 0.37 10*3/MM3 (ref 0.1–0.9)
MONOCYTES NFR BLD AUTO: 6.2 % (ref 5–12)
NEUTROPHILS NFR BLD AUTO: 4.56 10*3/MM3 (ref 1.7–7)
NEUTROPHILS NFR BLD AUTO: 76.9 % (ref 42.7–76)
NRBC BLD AUTO-RTO: 0 /100 WBC (ref 0–0.2)
PLATELET # BLD AUTO: 304 10*3/MM3 (ref 140–450)
PMV BLD AUTO: 10.7 FL (ref 6–12)
POTASSIUM SERPL-SCNC: 4.4 MMOL/L (ref 3.5–5.2)
PROT SERPL-MCNC: 6.9 G/DL (ref 6–8.5)
RBC # BLD AUTO: 4.68 10*6/MM3 (ref 3.77–5.28)
SODIUM SERPL-SCNC: 144 MMOL/L (ref 136–145)
T4 FREE SERPL-MCNC: 1.24 NG/DL (ref 0.93–1.7)
TRIGL SERPL-MCNC: 158 MG/DL (ref 0–150)
TSH SERPL DL<=0.05 MIU/L-ACNC: 0.95 UIU/ML (ref 0.27–4.2)
VLDLC SERPL-MCNC: 27 MG/DL (ref 5–40)
WBC # BLD AUTO: 5.93 10*3/MM3 (ref 3.4–10.8)

## 2021-10-08 PROCEDURE — 80061 LIPID PANEL: CPT | Performed by: NURSE PRACTITIONER

## 2021-10-08 PROCEDURE — 90670 PCV13 VACCINE IM: CPT | Performed by: NURSE PRACTITIONER

## 2021-10-08 PROCEDURE — 84443 ASSAY THYROID STIM HORMONE: CPT | Performed by: NURSE PRACTITIONER

## 2021-10-08 PROCEDURE — 84439 ASSAY OF FREE THYROXINE: CPT | Performed by: NURSE PRACTITIONER

## 2021-10-08 PROCEDURE — 80053 COMPREHEN METABOLIC PANEL: CPT | Performed by: NURSE PRACTITIONER

## 2021-10-08 PROCEDURE — G0008 ADMIN INFLUENZA VIRUS VAC: HCPCS | Performed by: NURSE PRACTITIONER

## 2021-10-08 PROCEDURE — 82306 VITAMIN D 25 HYDROXY: CPT | Performed by: NURSE PRACTITIONER

## 2021-10-08 PROCEDURE — G0009 ADMIN PNEUMOCOCCAL VACCINE: HCPCS | Performed by: NURSE PRACTITIONER

## 2021-10-08 PROCEDURE — 99214 OFFICE O/P EST MOD 30 MIN: CPT | Performed by: NURSE PRACTITIONER

## 2021-10-08 PROCEDURE — 85025 COMPLETE CBC W/AUTO DIFF WBC: CPT | Performed by: NURSE PRACTITIONER

## 2021-10-08 PROCEDURE — 90662 IIV NO PRSV INCREASED AG IM: CPT | Performed by: NURSE PRACTITIONER

## 2021-10-08 RX ORDER — LEVOTHYROXINE SODIUM 0.07 MG/1
75 TABLET ORAL DAILY
Qty: 90 TABLET | Refills: 1 | Status: SHIPPED | OUTPATIENT
Start: 2021-10-08 | End: 2022-03-14 | Stop reason: SDUPTHER

## 2021-10-08 RX ORDER — ATORVASTATIN CALCIUM 40 MG/1
40 TABLET, FILM COATED ORAL DAILY
Qty: 90 TABLET | Refills: 1 | Status: SHIPPED | OUTPATIENT
Start: 2021-10-08 | End: 2021-11-15

## 2021-10-08 RX ORDER — ERGOCALCIFEROL 1.25 MG/1
50000 CAPSULE ORAL WEEKLY
Qty: 13 CAPSULE | Refills: 0 | Status: SHIPPED | OUTPATIENT
Start: 2021-10-08 | End: 2021-12-10 | Stop reason: SDUPTHER

## 2021-10-08 RX ORDER — LISINOPRIL 10 MG/1
10 TABLET ORAL DAILY
Qty: 90 TABLET | Refills: 1 | Status: SHIPPED | OUTPATIENT
Start: 2021-10-08 | End: 2021-11-14 | Stop reason: SDUPTHER

## 2021-10-08 RX ORDER — ZOSTER VACCINE RECOMBINANT, ADJUVANTED 50 MCG/0.5
0.5 KIT INTRAMUSCULAR ONCE
Qty: 1 EACH | Refills: 1 | Status: SHIPPED | OUTPATIENT
Start: 2021-10-08 | End: 2021-10-08

## 2021-10-08 RX ORDER — OMEPRAZOLE 40 MG/1
40 CAPSULE, DELAYED RELEASE ORAL DAILY
Qty: 90 CAPSULE | Refills: 1 | Status: SHIPPED | OUTPATIENT
Start: 2021-10-08 | End: 2022-04-02 | Stop reason: SDUPTHER

## 2021-10-08 NOTE — ASSESSMENT & PLAN NOTE
Discussed elevated blood pressure at today's visit.  Discussed risks of blood pressure elevation including death, heart attack, stroke, chronic kidney disease, blindness.  Follow a low-salt diet and increase exercise.  Discussed importance of medication compliance and avoidance of missing doses.  Continue current meds at this time.  We will monitor at follow-up and adjust blood pressure medications if necessary.  ER with chest pain, palpitations, vision loss, unilateral weakness, or altered mental status.  Patient verbalized understanding.  Daughter will send a message through the portal with blood pressure readings after 2 weeks.  Discussed goal of less than 130/80.  Consider increasing lisinopril and/or adding amlodipine if significantly elevated.  Labs today.

## 2021-10-08 NOTE — ASSESSMENT & PLAN NOTE
We will send in high-dose vitamin D for her to take over the next 3 months.  Discussed switching to an over-the-counter D3 daily after that time.

## 2021-10-08 NOTE — PROGRESS NOTES
"Chief Complaint  Follow-up and Med Refill HTN  Subjective          Payal Singer presents to [facility]  History of Present Illness  HTN-she reports some elevated readings at home but not checking often.  She reports that her  recorded readings for her but she forgot to bring those today.  Denies cp, dizziness, headaches  Objective   Vital Signs:   /92   Pulse 83   Temp 98.5 °F (36.9 °C) (Temporal)   Ht 160 cm (63\")   Wt 72.1 kg (159 lb)   SpO2 94%   BMI 28.17 kg/m²     Physical Exam  Vitals and nursing note reviewed.   Constitutional:       General: She is not in acute distress.     Appearance: Normal appearance.   HENT:      Head: Normocephalic and atraumatic.      Right Ear: External ear normal.      Left Ear: External ear normal.      Nose: Nose normal.      Mouth/Throat:      Mouth: Mucous membranes are moist.   Eyes:      Conjunctiva/sclera: Conjunctivae normal.   Cardiovascular:      Rate and Rhythm: Normal rate and regular rhythm.      Pulses: Normal pulses.      Heart sounds: Normal heart sounds. No murmur heard.   No friction rub. No gallop.    Pulmonary:      Effort: Pulmonary effort is normal. No respiratory distress.      Breath sounds: No wheezing, rhonchi or rales.   Musculoskeletal:      Cervical back: Neck supple.   Skin:     General: Skin is warm and dry.   Neurological:      General: No focal deficit present.      Mental Status: She is alert and oriented to person, place, and time.   Psychiatric:         Mood and Affect: Mood normal.         Behavior: Behavior normal.        Result Review :          Procedures      Assessment and Plan    Diagnoses and all orders for this visit:    1. Primary hypertension (Primary)  Assessment & Plan:  Discussed elevated blood pressure at today's visit.  Discussed risks of blood pressure elevation including death, heart attack, stroke, chronic kidney disease, blindness.  Follow a low-salt diet and " increase exercise.  Discussed importance of medication compliance and avoidance of missing doses.  Continue current meds at this time.  We will monitor at follow-up and adjust blood pressure medications if necessary.  ER with chest pain, palpitations, vision loss, unilateral weakness, or altered mental status.  Patient verbalized understanding.  Daughter will send a message through the portal with blood pressure readings after 2 weeks.  Discussed goal of less than 130/80.  Consider increasing lisinopril and/or adding amlodipine if significantly elevated.  Labs today.    Orders:  -     Comprehensive Metabolic Panel  -     CBC & Differential  -     TSH  -     T4, Free  -     Lipid Panel    2. Acquired hypothyroidism  Assessment & Plan:  We will check labs in the office today.  Will review and adjust medications as needed    Orders:  -     TSH  -     T4, Free    3. Mixed hyperlipidemia  Assessment & Plan:  No leg cramps.  We will check labs in the office today.  Will review and adjust medications as needed    Orders:  -     T4, Free  -     Lipid Panel    4. Vitamin D deficiency  Assessment & Plan:  We will send in high-dose vitamin D for her to take over the next 3 months.  Discussed switching to an over-the-counter D3 daily after that time.    Orders:  -     Vitamin D 25 Hydroxy    5. Postmenopause  -     DEXA Bone Density Axial; Future    Other orders  -     atorvastatin (LIPITOR) 40 MG tablet; Take 1 tablet by mouth Daily.  Dispense: 90 tablet; Refill: 1  -     ergocalciferol (ERGOCALCIFEROL) 1.25 MG (92224 UT) capsule; Take 1 capsule by mouth 1 (One) Time Per Week.  Dispense: 13 capsule; Refill: 0  -     levothyroxine (SYNTHROID, LEVOTHROID) 75 MCG tablet; Take 1 tablet by mouth Daily.  Dispense: 90 tablet; Refill: 1  -     lisinopril (PRINIVIL,ZESTRIL) 10 MG tablet; Take 1 tablet by mouth Daily.  Dispense: 90 tablet; Refill: 1  -     omeprazole (priLOSEC) 40 MG capsule; Take 1 capsule by mouth Daily.  Dispense: 90  capsule; Refill: 1  -     Zoster Vac Recomb Adjuvanted (Shingrix) 50 MCG/0.5ML reconstituted suspension; Inject 0.5 mL into the appropriate muscle as directed by prescriber 1 (One) Time for 1 dose. Repeat 2-4 mths  Dispense: 1 each; Refill: 1  -     Pneumococcal Conjugate Vaccine 13-Valent All  -     Fluzone High-Dose 65+yrs ([id number])            Follow Up   Return in about 2 months (around 12/8/2021).  Patient was given instructions and counseling regarding her condition or for health maintenance advice. Please see specific information pulled into the AVS if appropriate.

## 2021-10-08 NOTE — PATIENT INSTRUCTIONS
Hypertension, Adult  Hypertension is another name for high blood pressure. High blood pressure forces your heart to work harder to pump blood. This can cause problems over time.  There are two numbers in a blood pressure reading. There is a top number (systolic) over a bottom number (diastolic). It is best to have a blood pressure that is below 120/80. Healthy choices can help lower your blood pressure, or you may need medicine to help lower it.  What are the causes?  The cause of this condition is not known. Some conditions may be related to high blood pressure.  What increases the risk?  · Smoking.  · Having type 2 diabetes mellitus, high cholesterol, or both.  · Not getting enough exercise or physical activity.  · Being overweight.  · Having too much fat, sugar, calories, or salt (sodium) in your diet.  · Drinking too much alcohol.  · Having long-term (chronic) kidney disease.  · Having a family history of high blood pressure.  · Age. Risk increases with age.  · Race. You may be at higher risk if you are .  · Gender. Men are at higher risk than women before age 45. After age 65, women are at higher risk than men.  · Having obstructive sleep apnea.  · Stress.  What are the signs or symptoms?  · High blood pressure may not cause symptoms. Very high blood pressure (hypertensive crisis) may cause:  ? Headache.  ? Feelings of worry or nervousness (anxiety).  ? Shortness of breath.  ? Nosebleed.  ? A feeling of being sick to your stomach (nausea).  ? Throwing up (vomiting).  ? Changes in how you see.  ? Very bad chest pain.  ? Seizures.  How is this treated?  · This condition is treated by making healthy lifestyle changes, such as:  ? Eating healthy foods.  ? Exercising more.  ? Drinking less alcohol.  · Your health care provider may prescribe medicine if lifestyle changes are not enough to get your blood pressure under control, and if:  ? Your top number is above 130.  ? Your bottom number is above  80.  · Your personal target blood pressure may vary.  Follow these instructions at home:  Eating and drinking    · If told, follow the DASH eating plan. To follow this plan:  ? Fill one half of your plate at each meal with fruits and vegetables.  ? Fill one fourth of your plate at each meal with whole grains. Whole grains include whole-wheat pasta, brown rice, and whole-grain bread.  ? Eat or drink low-fat dairy products, such as skim milk or low-fat yogurt.  ? Fill one fourth of your plate at each meal with low-fat (lean) proteins. Low-fat proteins include fish, chicken without skin, eggs, beans, and tofu.  ? Avoid fatty meat, cured and processed meat, or chicken with skin.  ? Avoid pre-made or processed food.  · Eat less than 1,500 mg of salt each day.  · Do not drink alcohol if:  ? Your doctor tells you not to drink.  ? You are pregnant, may be pregnant, or are planning to become pregnant.  · If you drink alcohol:  ? Limit how much you use to:  § 0-1 drink a day for women.  § 0-2 drinks a day for men.  ? Be aware of how much alcohol is in your drink. In the [place] one drink equals one 12 oz bottle of beer (355 mL), one 5 oz glass of wine (148 mL), or one 1½ oz glass of hard liquor (44 mL).    Lifestyle    · Work with your doctor to stay at a healthy weight or to lose weight. Ask your doctor what the best weight is for you.  · Get at least 30 minutes of exercise most days of the week. This may include walking, swimming, or biking.  · Get at least 30 minutes of exercise that strengthens your muscles (resistance exercise) at least 3 days a week. This may include lifting weights or doing Pilates.  · Do not use any products that contain nicotine or tobacco, such as cigarettes, e-cigarettes, and chewing tobacco. If you need help quitting, ask your doctor.  · Check your blood pressure at home as told by your doctor.  · Keep all follow-up visits as told by your doctor. This is important.    Medicines  · Take  over-the-counter and prescription medicines only as told by your doctor. Follow directions carefully.  · Do not skip doses of blood pressure medicine. The medicine does not work as well if you skip doses. Skipping doses also puts you at risk for problems.  · Ask your doctor about side effects or reactions to medicines that you should watch for.  Contact a doctor if you:  · Think you are having a reaction to the medicine you are taking.  · Have headaches that keep coming back (recurring).  · Feel dizzy.  · Have swelling in your ankles.  · Have trouble with your vision.  Get help right away if you:  · Get a very bad headache.  · Start to feel mixed up (confused).  · Feel weak or numb.  · Feel faint.  · Have very bad pain in your:  ? Chest.  ? Belly (abdomen).  · Throw up more than once.  · Have trouble breathing.  Summary  · Hypertension is another name for high blood pressure.  · High blood pressure forces your heart to work harder to pump blood.  · For most people, a normal blood pressure is less than 120/80.  · Making healthy choices can help lower blood pressure. If your blood pressure does not get lower with healthy choices, you may need to take medicine.  This information is not intended to replace advice given to you by your health care provider. Make sure you discuss any questions you have with your health care provider.  Document Revised: 08/28/2019 Document Reviewed: 08/28/2019  Elsevier Patient Education © 2021 [employer]

## 2021-10-08 NOTE — ASSESSMENT & PLAN NOTE
No leg cramps.  We will check labs in the office today.  Will review and adjust medications as needed

## 2021-10-09 LAB — 25(OH)D3 SERPL-MCNC: 34.9 NG/ML

## 2021-10-11 RX ORDER — QUETIAPINE FUMARATE 25 MG/1
50 TABLET, FILM COATED ORAL NIGHTLY
Qty: 60 TABLET | Refills: 2 | Status: SHIPPED | OUTPATIENT
Start: 2021-10-11 | End: 2021-12-15 | Stop reason: SDUPTHER

## 2021-10-12 ENCOUNTER — TRANSCRIBE ORDERS (OUTPATIENT)
Dept: ADMINISTRATIVE | Facility: HOSPITAL | Age: 71
End: 2021-10-12

## 2021-10-12 DIAGNOSIS — Z12.31 VISIT FOR SCREENING MAMMOGRAM: Primary | ICD-10-CM

## 2021-11-08 ENCOUNTER — TELEMEDICINE (OUTPATIENT)
Dept: PSYCHIATRY | Facility: CLINIC | Age: 71
End: 2021-11-08

## 2021-11-08 DIAGNOSIS — F41.1 GENERALIZED ANXIETY DISORDER: ICD-10-CM

## 2021-11-08 DIAGNOSIS — R68.89 FORGETFULNESS: ICD-10-CM

## 2021-11-08 DIAGNOSIS — F03.90 DEMENTIA WITHOUT BEHAVIORAL DISTURBANCE, UNSPECIFIED DEMENTIA TYPE: Primary | ICD-10-CM

## 2021-11-08 DIAGNOSIS — F33.1 MAJOR DEPRESSIVE DISORDER, RECURRENT EPISODE, MODERATE (HCC): ICD-10-CM

## 2021-11-08 DIAGNOSIS — R44.3 HALLUCINATIONS: ICD-10-CM

## 2021-11-08 PROCEDURE — 90833 PSYTX W PT W E/M 30 MIN: CPT | Performed by: STUDENT IN AN ORGANIZED HEALTH CARE EDUCATION/TRAINING PROGRAM

## 2021-11-08 PROCEDURE — 99214 OFFICE O/P EST MOD 30 MIN: CPT | Performed by: STUDENT IN AN ORGANIZED HEALTH CARE EDUCATION/TRAINING PROGRAM

## 2021-11-08 RX ORDER — VENLAFAXINE 37.5 MG/1
112.5 TABLET ORAL DAILY
Qty: 90 TABLET | Refills: 2 | Status: SHIPPED | OUTPATIENT
Start: 2021-11-08 | End: 2022-02-11 | Stop reason: SDUPTHER

## 2021-11-08 NOTE — PROGRESS NOTES
"Subjective   Payal Singer is a 71 y.o. female who presents today for follow up    Chief Complaint:  mdd meagan    History of Present Illness:     Chart review 4/15: Seen 3/30 to establish care. Hx of anx/dep avh in [place]. on both sertraline and venlafaxine, was on Seroquel. Hx of HTN, GERD. On gabapentin 600 tid, sertraline 200 d, venla 150 d, quetiap 25 qhs. March 2021 labs: cbc shows low mch, high rdw; Cr 1.15 high, abnormal lipids, TSH and fT4 wnl. No OP head imaging, ekg.     \"Kirsty\"    11/8: Virtual visit via Zoom audio and video due to the COVID-19 pandemic.  Patient is accepting of and agreeable to visit.  The visit consisted of the patient and I.  Interview:  1. Chart review: Seen by primary care October 8.  Hypertension well controlled.  Labs demonstrate elevated CO2 of 29.4 on CMP, creatinine is 1.00 normal, thyroid studies are normal, elevated triglycerides, normal CBC.  Patient underwent an MRI for hallucinations and it shows no acute/nothing.  2. \"Pretty good.\"  3. Depression/Mood: \"depressed in the afternoon.\"  a. Lifts after she takes her medication.  b. Seasonal component  c. [facility] and its weather, and her family.  1. Anhedonia: denies  2. Guilt or hopelessness: denies  3. Energy: down until the afternoon  4. Concentration: sometimes  5. Weight loss or weight gain: lost 20 lbs over a year  6. Psychomotor retardation or agitation: possible retardation  7. Insomnia: denies  4. Anxiety:  1. Uncontrolled worrying: yes  2. Muscle tension: sometimes  3. Fatigue: down until the afternoon  4. Concentration: sometimes  5. Restlessness/feeling on edge: sometimes in the day, not at night  6. Irritability: sometimes  7. Insomnia: denies  5. Sleeping: yes  6. Eating: well  7. Substances: denies  8. Therapy: deferred  9. Medication compliant: some issues with compliance (on accident)  a. Taking 150 mg of venlafaxine for the last week, rather than 75 mg due to pharmacy error.  10. No SI HI " "AVH.  a. Sees bugs, but they actually have a bug issue in the apartment. (ladybugs)  ismael Rhodes does not believe she is hallucinating.      9/7: Virtual visit via Zoom audio and video due to the COVID-19 pandemic.  Patient is accepting of and agreeable to visit.  The visit consisted of the patient and I.  Interview:  11. Records review shows no new developments.  12. \"Everything's pretty good.\"  13. No hallucinations at all. Might see a shadow.  14. Depression better.  15.  doing better, but has dizziness.  16. Meagan in agreement.  17. No SI HI AVH.      7/27: Virtual visit via Zoom audio and video due to the COVID-19 pandemic. Patient is accepting of and agreeable to appointment. The appointment consisted of the patient and I only.   Interview: \"Good\"   1. Having VH less frequently, once or twice a week. Like someone passes me real fast. Not experiencing spiders.  2. Sleeping well.  3.  doing better. Worrying is much better.  4. Medicine has been helpful.  5. Still feels depressed. Homesick for [place].   6. No panic attacks in last two weeks.  7. Much clearer today, linear. Better historian.  8. Meagan agrees.  9. No SI HI AVH.    Previous:  Still having panic attacks once a week. Sx: soa, sweating, palpitations, tachycardia, fear, no derealization/depersonalization, last 30 min, leaves room where she was at. No triggers. Fear of another panic attack happening.     Anxiety due to 's health; admitted to hospital recently for dehydration. Notes uncontrolled worrying, muscle tension, irritability, restlessness, trouble sleeping - will wake at 3 am and can't go back to sleep. Duration is at least 6 months, since moved here.    Still having hallucinations every night before she goes to bed. Sees shadows walk past bed. No AH. Sometimes sees spiders during the day on the floor; happens once or twice a week.     Seroquel helped to some extent.     Again, patient is somewhat of a poor historian. Often " "times she will answer a question with \"no,\" and then contradict her self moments later.    Collateral from Meagan: patient did have a UTI (UA confirmed); was put on a course of abx. Feels her mom's confusion has improved since then.      Past Surgical History:  Past Surgical History:   Procedure Laterality Date   • CATARACT EXTRACTION  2002   • CHOLECYSTECTOMY OPEN  1971       Problem List:  Patient Active Problem List   Diagnosis   • Forgetfulness   • Anxiety   • Depression   • Esophageal reflux   • Hallucinations   • Head injury   • Hypertension   • Other acute sinusitis   • PTSD (post-traumatic stress disorder)   • Mixed hyperlipidemia   • Acquired hypothyroidism   • Vitamin D deficiency       Allergy:   Allergies   Allergen Reactions   • Floxin Otic [Ofloxacin] Rash        Discontinued Medications:  Medications Discontinued During This Encounter   Medication Reason   • venlafaxine (EFFEXOR) 37.5 MG tablet Reorder       Current Medications:   Current Outpatient Medications   Medication Sig Dispense Refill   • aspirin (aspirin) 81 MG EC tablet aspirin 81 mg oral tablet,delayed release (DR/EC) take 1 tablet (81 mg) by oral route once daily   Active     • atorvastatin (LIPITOR) 40 MG tablet Take 1 tablet by mouth Daily. 90 tablet 1   • clindamycin (CLEOCIN T) 1 % external solution      • ergocalciferol (ERGOCALCIFEROL) 1.25 MG (80684 UT) capsule Take 1 capsule by mouth 1 (One) Time Per Week. 13 capsule 0   • gabapentin (NEURONTIN) 600 MG tablet gabapentin 600 mg oral tablet take .5 tablet (300 mg) by oral route 2 times per day   Active     • levothyroxine (SYNTHROID, LEVOTHROID) 75 MCG tablet Take 1 tablet by mouth Daily. 90 tablet 1   • lisinopril (PRINIVIL,ZESTRIL) 10 MG tablet Take 1 tablet by mouth Daily. 90 tablet 1   • metoprolol tartrate (LOPRESSOR) 25 MG tablet metoprolol tartrate 25 mg oral tablet take 2 tablets (50 mg) by oral route once daily   Active     • omeprazole (priLOSEC) 40 MG capsule Take 1 " "capsule by mouth Daily. 90 capsule 1   • QUEtiapine (SEROquel) 25 MG tablet Take 2 tablets by mouth Every Night. 60 tablet 2   • venlafaxine (EFFEXOR) 37.5 MG tablet Take 3 tablets by mouth Daily. 90 tablet 2     No current facility-administered medications for this visit.       Past Medical History:  Past Medical History:   Diagnosis Date   • Acid reflux    • Allergies    • Anxiety    • Depression    • Forgetfulness    • Hallucinations 04/15/2021   • Head injury    • HTN (hypertension)    • PTSD (post-traumatic stress disorder)          Social History     Socioeconomic History   • Marital status: Unknown   Tobacco Use   • Smoking status: Never Smoker   • Smokeless tobacco: Never Used   Vaping Use   • Vaping Use: Never used   Substance and Sexual Activity   • Alcohol use: Never   • Drug use: Never   • Sexual activity: Not Currently         Family History   Problem Relation Age of Onset   • Stroke Mother    • Heart disease Mother    • Stroke Father    • Heart disease Father    • Diabetes Father    • Stroke Sister    • Diabetes Sister    • Stroke Brother    • Diabetes Brother    • Diabetes Other         AUNT/UNCLE   • Anxiety disorder Other        Mental Status Exam:   Hygiene:   good, at home  Cooperation:  Cooperative  Eye Contact:  Good  Psychomotor Behavior:  Appropriate  Affect:  Appropriate, mood congruent, a little depressed  Mood: \"depressed in the mornings\"  Hopelessness: Denies  Speech:  Normal  Thought Process:  Goal directed  Thought Content:  Normal and Mood congruent  Suicidal:  None  Homicidal:  None  Hallucinations:  None  Delusion:  None  Memory:  Deficits  Orientation:  Grossly intact  Reliability:  fair  Insight:  Fair  Judgement:  Fair  Impulse Control:  Fair  Physical/Medical Issues:  No      Review of Systems:  Review of Systems   Constitutional: Positive for fatigue. Negative for diaphoresis.   HENT: Negative for drooling.    Eyes: Negative for visual disturbance.   Respiratory: Positive for " cough. Negative for shortness of breath.    Cardiovascular: Negative for chest pain, palpitations and leg swelling.   Gastrointestinal: Negative for nausea and vomiting.   Endocrine: Positive for heat intolerance. Negative for cold intolerance.   Genitourinary: Negative for difficulty urinating.   Musculoskeletal: Negative for joint swelling.   Allergic/Immunologic: Negative for immunocompromised state.   Neurological: Negative for dizziness and seizures.   Hematological: Negative for adenopathy.         Physical Exam:  Physical Exam    Vital Signs:   There were no vitals taken for this visit.     Lab Results:   Office Visit on 10/08/2021   Component Date Value Ref Range Status   • Glucose 10/08/2021 81  65 - 99 mg/dL Final   • BUN 10/08/2021 19  8 - 23 mg/dL Final   • Creatinine 10/08/2021 1.00  0.57 - 1.00 mg/dL Final   • Sodium 10/08/2021 144  136 - 145 mmol/L Final   • Potassium 10/08/2021 4.4  3.5 - 5.2 mmol/L Final   • Chloride 10/08/2021 105  98 - 107 mmol/L Final   • CO2 10/08/2021 29.4* 22.0 - 29.0 mmol/L Final   • Calcium 10/08/2021 9.2  8.6 - 10.5 mg/dL Final   • Total Protein 10/08/2021 6.9  6.0 - 8.5 g/dL Final   • Albumin 10/08/2021 4.10  3.50 - 5.20 g/dL Final   • ALT (SGPT) 10/08/2021 9  1 - 33 U/L Final   • AST (SGOT) 10/08/2021 17  1 - 32 U/L Final   • Alkaline Phosphatase 10/08/2021 114  39 - 117 U/L Final   • Total Bilirubin 10/08/2021 0.3  0.0 - 1.2 mg/dL Final   • eGFR Non African Amer 10/08/2021 55* >60 mL/min/1.73 Final   • eGFR   Amer 10/08/2021 66  >60 mL/min/1.73 Final   • Globulin 10/08/2021 2.8  gm/dL Final   • A/G Ratio 10/08/2021 1.5  g/dL Final   • BUN/Creatinine Ratio 10/08/2021 19.0  7.0 - 25.0 Final   • Anion Gap 10/08/2021 9.6  5.0 - 15.0 mmol/L Final   • TSH 10/08/2021 0.955  0.270 - 4.200 uIU/mL Final   • Free T4 10/08/2021 1.24  0.93 - 1.70 ng/dL Final   • Total Cholesterol 10/08/2021 141  0 - 200 mg/dL Final   • Triglycerides 10/08/2021 158* 0 - 150 mg/dL Final   • HDL  Cholesterol 10/08/2021 38* 40 - 60 mg/dL Final   • LDL Cholesterol  10/08/2021 76  0 - 100 mg/dL Final   • VLDL Cholesterol 10/08/2021 27  5 - 40 mg/dL Final   • LDL/HDL Ratio 10/08/2021 1.88   Final   • 25 Hydroxy, Vitamin D 10/08/2021 34.9  ng/ml Final   • WBC 10/08/2021 5.93  3.40 - 10.80 10*3/mm3 Final   • RBC 10/08/2021 4.68  3.77 - 5.28 10*6/mm3 Final   • Hemoglobin 10/08/2021 12.5  12.0 - 15.9 g/dL Final   • Hematocrit 10/08/2021 39.0  34.0 - 46.6 % Final   • MCV 10/08/2021 83.3  79.0 - 97.0 fL Final   • MCH 10/08/2021 26.7  26.6 - 33.0 pg Final   • MCHC 10/08/2021 32.1  31.5 - 35.7 g/dL Final   • RDW 10/08/2021 14.8  12.3 - 15.4 % Final   • RDW-SD 10/08/2021 44.5  37.0 - 54.0 fl Final   • MPV 10/08/2021 10.7  6.0 - 12.0 fL Final   • Platelets 10/08/2021 304  140 - 450 10*3/mm3 Final   • Neutrophil % 10/08/2021 76.9* 42.7 - 76.0 % Final   • Lymphocyte % 10/08/2021 13.7* 19.6 - 45.3 % Final   • Monocyte % 10/08/2021 6.2  5.0 - 12.0 % Final   • Eosinophil % 10/08/2021 2.2  0.3 - 6.2 % Final   • Basophil % 10/08/2021 0.5  0.0 - 1.5 % Final   • Immature Grans % 10/08/2021 0.5  0.0 - 0.5 % Final   • Neutrophils, Absolute 10/08/2021 4.56  1.70 - 7.00 10*3/mm3 Final   • Lymphocytes, Absolute 10/08/2021 0.81  0.70 - 3.10 10*3/mm3 Final   • Monocytes, Absolute 10/08/2021 0.37  0.10 - 0.90 10*3/mm3 Final   • Eosinophils, Absolute 10/08/2021 0.13  0.00 - 0.40 10*3/mm3 Final   • Basophils, Absolute 10/08/2021 0.03  0.00 - 0.20 10*3/mm3 Final   • Immature Grans, Absolute 10/08/2021 0.03  0.00 - 0.05 10*3/mm3 Final   • nRBC 10/08/2021 0.0  0.0 - 0.2 /100 WBC Final   Conversion Encounter on 06/01/2021   Component Date Value Ref Range Status   • Leukocytes, UA 06/01/2021 Negative   Final   • Nitrite, UA 06/01/2021 Negative   Final   • Urobilinogen, UA 06/01/2021 0.2 E.U./dL   Final   • Protein, UA 06/01/2021 >=300 mg/dL   Final   • pH, UA 06/01/2021 5.5   Final   • Blood, UA 06/01/2021 Moderate   Final   • Specific Gravity,  UA 06/01/2021 greater than 1.030   Final   • Ketones, UA 06/01/2021 Trace   Final   • Bilirubin, UA 06/01/2021 Small   Final   • Glucose, UA 06/01/2021 Negative   Final   • Appearance 06/01/2021 Slightly cloudy   Final   • Color, UA 06/01/2021 Orange   Final       EKG Results:  No orders to display       Imaging Results:  No Images in the past 120 days found..      Assessment/Plan   Diagnoses and all orders for this visit:    1. Dementia without behavioral disturbance, unspecified dementia type (HCC) (Primary)    2. Hallucinations    3. Generalized anxiety disorder  -     venlafaxine (EFFEXOR) 37.5 MG tablet; Take 3 tablets by mouth Daily.  Dispense: 90 tablet; Refill: 2    4. Forgetfulness    5. Major depressive disorder, recurrent episode, moderate (HCC)  -     venlafaxine (EFFEXOR) 37.5 MG tablet; Take 3 tablets by mouth Daily.  Dispense: 90 tablet; Refill: 2      Presentation most consistent with developing dementia, likely Lewy body dementia.  Patient also reports worsening gait abnormalities; she fell in the summer of last year, injuring her head, and requiring stitches.  Patient also has a history of depression, possible PTSD by review of chart.  Patient did not mention PTSD during the interview, but she is not a very good historian.  Often times she will say no to a question, and then contradict herself only a few moments later.  She has some trouble with knowing her medications or her medical conditions.    11/8: Depressed in the mornings. Light box, increase venlafaxine. 17 minutes of supportive psychotherapy with goal to strengthen defenses, promote problems solving, restore adaptive functioning and provide symptom relief. The therapeutic alliance was strengthened to encourage the patient to express their thoughts and feelings. Esteem building was enhanced through praise, reassurance, normalizing and encouragement. Coping skills were enhanced to build distress tolerance skills and emotional regulation.  Patient given education on medication side effects, diagnosis/illness and relapse symptoms. Plan to continue supportive psychotherapy in next appointment to provide symptom relief. 8 wks      9/7: Doing even better.  8 weeks.  No changes.    7/27: Markedly improved, and far more linear.  Still has residual anxiety and depression.  Hallucinations are still present but they do not cause distress at all to the patient.  Continue Seroquel at the present dose.  Double the dose of venlafaxine. See back in 6 weeks.  This was also discussed with Meagan, patient's daughter.    Previous:  Caution again advised in terms of increasing the dose given the likely sensitivity she has to antipsychotics.  Psychotherapy is deferred at this time.  S/p referral to neurologist is appropriate for further management.     Visit Diagnoses:    ICD-10-CM ICD-9-CM   1. Dementia without behavioral disturbance, unspecified dementia type (HCC)  F03.90 294.20   2. Hallucinations  R44.3 780.1   3. Generalized anxiety disorder  F41.1 300.02   4. Forgetfulness  R68.89 780.99   5. Major depressive disorder, recurrent episode, moderate (HCC)  F33.1 296.32       PLAN:  18. Risk Assessment: Risk of self-harm acutely is low. Risk factors include anxiety disorder, depressive disorder, access to weapons, recent psychosocial stressors (pandemic). Protective factors include no family history, no present SI, no history of suicide attempts or self-harm in the past, no access to weapons, no AODA, healthcare seeking, future orientation, willingness to engage in care. Risk of self-harm chronically is also low, but could be further elevated in the event of treatment noncompliance and/or AODA.  19. Safety: No acute safety concerns.  20. Medications:   a. CONTINUE quetiapine 50 mg nightly. Risks, benefits, alternatives discussed with patient including nausea and vomiting, GI upset, sedation, akathisia, hypotension, increased appetite, lowering of seizure threshold,  theoretical risk of tardive dyskinesia, extrapyramidal symptoms, restless legs syndrome. After discussion of these risks and benefits, the patient voiced understanding and agreed to proceed.  b. STATUS POST sertraline 100 mg daily.  c. INCREASE venlafaxine 75 to 112.5 mg PO QDAY. Risks, benefits, alternatives discussed with patient including GI upset, nausea vomiting diarrhea, theoretical decrease of seizure threshold predisposing the patient to a slightly higher seizure risk, headaches, sexual dysfunction, serotonin syndrome, bleeding risk, increased suicidality in patients 24 years and younger.  After discussion of these risks and benefits, the patient voiced understanding and agreed to proceed.  21. Therapy: Deferred.  22. Other: s/p referral to neurology for workup of likely dementia.  23. Follow Up: 8 wks.      TREATMENT PLAN/GOALS: Continue supportive psychotherapy efforts and medications as indicated. Treatment and medication options discussed during today's visit. Patient ackowledged and verbally consented to continue with current treatment plan and was educated on the importance of compliance with treatment and follow-up appointments.    MEDICATION ISSUES:  ERNESTINA reviewed as expected.  Discussed medication options and treatment plan of prescribed medication as well as the risks, benefits, and side effects including potential falls, possible impaired driving and metabolic adversities among others. Patient is agreeable to call the office with any worsening of symptoms or onset of side effects. Patient is agreeable to call 911 or go to the nearest ER should he/she begin having SI/HI. No medication side effects or related complaints today.     MEDS ORDERED DURING VISIT:  New Medications Ordered This Visit   Medications   • venlafaxine (EFFEXOR) 37.5 MG tablet     Sig: Take 3 tablets by mouth Daily.     Dispense:  90 tablet     Refill:  2       Return in about 8 weeks (around 1/3/2022).         This document  has been electronically signed by Riki Peterson MD  November 8, 2021 10:25 EST      Part of this note may be an electronic transcription/translation of spoken language to printed text using the Dragon Dictation System.

## 2021-11-08 NOTE — PATIENT INSTRUCTIONS
1.  Please return to clinic at your next scheduled visit.  Contact the clinic ([phone]) at least 24 hours prior in the event you need to cancel.  2.  Do no harm to yourself or others.    3.  Avoid alcohol and drugs.    4.  Take all medications as prescribed.  Please contact the clinic with any concerns. If you are in need of medication refills, please call the clinic at [phone].    5. Should you want to get in touch with your provider, Dr. Riki Peterson, please utilize [facility] or contact the office ([phone]), and staff will be able to page Dr. Peterson directly.  6.  In the event you have personal crisis, contact the following crisis numbers: Suicide Prevention Hotline 1-498.671.2743; [facility]ATE Helpline 3-835-477-RWKC; [facility] Emergency Room [phone]; text HELLO to 633887; or 810.     SPECIFIC RECOMMENDATIONS:     1.      Medications discussed at this encounter:                   - Light box: Obtain a light box, 10,000 lux, put the box about 2-3 feet away from you, facing you, use it daily, right after waking up, for 30 minutes daily, don't stare directly at it. Read, eat, watch tv, etc. Call your insurance company to see if they can reimburse you for the cost.    - increase venlafaxine       2.      Psychotherapy recommendations:      3.     Return to clinic: 8 weeks

## 2021-11-15 RX ORDER — LISINOPRIL 10 MG/1
10 TABLET ORAL DAILY
Qty: 90 TABLET | Refills: 1 | Status: SHIPPED | OUTPATIENT
Start: 2021-11-15 | End: 2021-11-17

## 2021-11-15 RX ORDER — ATORVASTATIN CALCIUM 40 MG/1
TABLET, FILM COATED ORAL
Qty: 90 TABLET | Refills: 0 | Status: SHIPPED | OUTPATIENT
Start: 2021-11-15 | End: 2021-12-17 | Stop reason: SDUPTHER

## 2021-11-17 RX ORDER — LISINOPRIL 10 MG/1
TABLET ORAL
Qty: 30 TABLET | Refills: 0 | Status: SHIPPED | OUTPATIENT
Start: 2021-11-17 | End: 2022-03-14 | Stop reason: SDUPTHER

## 2021-12-13 RX ORDER — ATORVASTATIN CALCIUM 40 MG/1
40 TABLET, FILM COATED ORAL DAILY
Qty: 90 TABLET | Refills: 0 | OUTPATIENT
Start: 2021-12-13

## 2021-12-13 RX ORDER — ERGOCALCIFEROL 1.25 MG/1
50000 CAPSULE ORAL WEEKLY
Qty: 13 CAPSULE | Refills: 0 | Status: SHIPPED | OUTPATIENT
Start: 2021-12-13 | End: 2022-03-14 | Stop reason: SDUPTHER

## 2021-12-15 DIAGNOSIS — R44.3 HALLUCINATIONS: ICD-10-CM

## 2021-12-15 RX ORDER — QUETIAPINE FUMARATE 25 MG/1
50 TABLET, FILM COATED ORAL NIGHTLY
Qty: 60 TABLET | Refills: 2 | Status: SHIPPED | OUTPATIENT
Start: 2021-12-15 | End: 2022-02-11 | Stop reason: SDUPTHER

## 2021-12-17 RX ORDER — ATORVASTATIN CALCIUM 40 MG/1
40 TABLET, FILM COATED ORAL DAILY
Qty: 90 TABLET | Refills: 0 | Status: SHIPPED | OUTPATIENT
Start: 2021-12-17 | End: 2022-03-14 | Stop reason: SDUPTHER

## 2022-01-07 ENCOUNTER — APPOINTMENT (OUTPATIENT)
Dept: MAMMOGRAPHY | Facility: HOSPITAL | Age: 72
End: 2022-01-07

## 2022-01-07 ENCOUNTER — APPOINTMENT (OUTPATIENT)
Dept: BONE DENSITY | Facility: HOSPITAL | Age: 72
End: 2022-01-07

## 2022-02-11 DIAGNOSIS — R44.3 HALLUCINATIONS: ICD-10-CM

## 2022-02-11 DIAGNOSIS — F41.1 GENERALIZED ANXIETY DISORDER: ICD-10-CM

## 2022-02-11 DIAGNOSIS — F33.1 MAJOR DEPRESSIVE DISORDER, RECURRENT EPISODE, MODERATE: ICD-10-CM

## 2022-02-11 RX ORDER — QUETIAPINE FUMARATE 25 MG/1
50 TABLET, FILM COATED ORAL NIGHTLY
Qty: 60 TABLET | Refills: 2 | Status: SHIPPED | OUTPATIENT
Start: 2022-02-11 | End: 2022-04-19 | Stop reason: SDUPTHER

## 2022-02-11 RX ORDER — VENLAFAXINE 37.5 MG/1
112.5 TABLET ORAL DAILY
Qty: 90 TABLET | Refills: 2 | Status: SHIPPED | OUTPATIENT
Start: 2022-02-11 | End: 2022-04-19 | Stop reason: SDUPTHER

## 2022-03-08 ENCOUNTER — TELEMEDICINE (OUTPATIENT)
Dept: PSYCHIATRY | Facility: CLINIC | Age: 72
End: 2022-03-08

## 2022-03-08 DIAGNOSIS — F41.1 GENERALIZED ANXIETY DISORDER: Primary | ICD-10-CM

## 2022-03-08 DIAGNOSIS — F33.1 MAJOR DEPRESSIVE DISORDER, RECURRENT EPISODE, MODERATE: ICD-10-CM

## 2022-03-08 DIAGNOSIS — F03.90 DEMENTIA WITHOUT BEHAVIORAL DISTURBANCE, UNSPECIFIED DEMENTIA TYPE: ICD-10-CM

## 2022-03-08 DIAGNOSIS — R68.89 FORGETFULNESS: ICD-10-CM

## 2022-03-08 DIAGNOSIS — R44.3 HALLUCINATIONS: ICD-10-CM

## 2022-03-08 PROCEDURE — 90833 PSYTX W PT W E/M 30 MIN: CPT | Performed by: STUDENT IN AN ORGANIZED HEALTH CARE EDUCATION/TRAINING PROGRAM

## 2022-03-08 PROCEDURE — 99214 OFFICE O/P EST MOD 30 MIN: CPT | Performed by: STUDENT IN AN ORGANIZED HEALTH CARE EDUCATION/TRAINING PROGRAM

## 2022-03-08 RX ORDER — BUSPIRONE HYDROCHLORIDE 10 MG/1
10 TABLET ORAL DAILY
Qty: 30 TABLET | Refills: 2 | Status: SHIPPED | OUTPATIENT
Start: 2022-03-08 | End: 2022-04-19 | Stop reason: SDUPTHER

## 2022-03-08 RX ORDER — ASCORBIC ACID 250 MG
TABLET,CHEWABLE ORAL
COMMUNITY

## 2022-03-08 RX ORDER — MULTIPLE VITAMINS W/ MINERALS TAB 9MG-400MCG
1 TAB ORAL DAILY
COMMUNITY

## 2022-03-08 NOTE — PATIENT INSTRUCTIONS
1.  Please return to clinic at your next scheduled visit.  Contact the clinic ([phone]) at least 24 hours prior in the event you need to cancel.  2.  Do no harm to yourself or others.    3.  Avoid alcohol and drugs.    4.  Take all medications as prescribed.  Please contact the clinic with any concerns. If you are in need of medication refills, please call the clinic at [phone].    5. Should you want to get in touch with your provider, Dr. Riki Peterson, please utilize [employer] or contact the office ([phone]), and staff will be able to page Dr. Peterson directly.  6.  In the event you have personal crisis, contact the following crisis numbers: Suicide Prevention Hotline 1-207.652.7207; [facility]ATE Helpline 0-426-624-KZJH; [facility] Emergency Room [phone]; text HELLO to 947116; or 898.     SPECIFIC RECOMMENDATIONS:     1.      Medications discussed at this encounter:                   -      2.      Psychotherapy recommendations:      3.     Return to clinic: 5  weeks

## 2022-03-08 NOTE — PROGRESS NOTES
"Subjective   Payal Singer is a 71 y.o. female who presents today for follow up    Chief Complaint:  mdd meagan    History of Present Illness:     Chart review 4/15: Seen 3/30 to establish care. Hx of anx/dep avh in [place]. on both sertraline and venlafaxine, was on Seroquel. Hx of HTN, GERD. On gabapentin 600 tid, sertraline 200 d, venla 150 d, quetiap 25 qhs. March 2021 labs: cbc shows low mch, high rdw; Cr 1.15 high, abnormal lipids, TSH and fT4 wnl. No OP head imaging, ekg.     \"Meagan and Payal\"    3/8: Virtual visit via Zoom audio and video due to the COVID-19 pandemic.  Patient is accepting of and agreeable to visit.  The visit consisted of the patient and I. The patient is at home, and I am at the office.  Interview:  1. Chart review: No new developments  2. \" I get depressed at 4:00 every day.\"  a. Lots of activity at this time.  Kids getting food, having to take care of the animals.  b. Meagan believes patient gets overwhelmed during this time.  After she mention this, the patient agreed.  c. Has never tried BuSpar.  Has been on gabapentin in the past, is not presently on it.  Was on it for neuropathic pain.  d. Otherwise depression and anxiety are extremely well controlled.  Increasing Effexor was helpful for the patient's anxiety.  e. Generally sleeping well.  3. Medication compliant: Yes  4. No SI HI AVH.      11/8: Virtual visit via Zoom audio and video due to the COVID-19 pandemic.  Patient is accepting of and agreeable to visit.  The visit consisted of the patient and I.  Interview:  5. Chart review: Seen by primary care October 8.  Hypertension well controlled.  Labs demonstrate elevated CO2 of 29.4 on CMP, creatinine is 1.00 normal, thyroid studies are normal, elevated triglycerides, normal CBC.  Patient underwent an MRI for hallucinations and it shows no acute/nothing.  6. \"Pretty good.\"  7. Depression/Mood: \"depressed in the afternoon.\"  a. Lifts after she takes her medication.  b. Seasonal " "component  c.  [place] and its weather, and her family.  1. Anhedonia: denies  2. Guilt or hopelessness: denies  3. Energy: down until the afternoon  4. Concentration: sometimes  5. Weight loss or weight gain: lost 20 lbs over a year  6. Psychomotor retardation or agitation: possible retardation  7. Insomnia: denies  8. Anxiety:  1. Uncontrolled worrying: yes  2. Muscle tension: sometimes  3. Fatigue: down until the afternoon  4. Concentration: sometimes  5. Restlessness/feeling on edge: sometimes in the day, not at night  6. Irritability: sometimes  7. Insomnia: denies  9. Sleeping: yes  10. Eating: well  11. Substances: denies  12. Therapy: deferred  13. Medication compliant: some issues with compliance (on accident)  a. Taking 150 mg of venlafaxine for the last week, rather than 75 mg due to pharmacy error.  14. No SI HI AVH.  a. Sees bugs, but they actually have a bug issue in the apartment. (ladybugs)  b. Meagan does not believe she is hallucinating.      9/7: Virtual visit via Zoom audio and video due to the COVID-19 pandemic.  Patient is accepting of and agreeable to visit.  The visit consisted of the patient and I.  Interview:  15. Records review shows no new developments.  16. \"Everything's pretty good.\"  17. No hallucinations at all. Might see a shadow.  18. Depression better.  19.  doing better, but has dizziness.  20. Meagan in agreement.  21. No SI HI AVH.      7/27: Virtual visit via Zoom audio and video due to the COVID-19 pandemic. Patient is accepting of and agreeable to appointment. The appointment consisted of the patient and I only.   Interview: \"Good\"   1. Having VH less frequently, once or twice a week. Like someone passes me real fast. Not experiencing spiders.  2. Sleeping well.  3.  doing better. Worrying is much better.  4. Medicine has been helpful.  5. Still feels depressed. Homesick for [place].   6. No panic attacks in last two weeks.  7. Much clearer today, linear. " "Better historian.  8. Meagan agrees.  9. No SI HI AVH.    Previous:  Still having panic attacks once a week. Sx: soa, sweating, palpitations, tachycardia, fear, no derealization/depersonalization, last 30 min, leaves room where she was at. No triggers. Fear of another panic attack happening.     Anxiety due to 's health; admitted to hospital recently for dehydration. Notes uncontrolled worrying, muscle tension, irritability, restlessness, trouble sleeping - will wake at 3 am and can't go back to sleep. Duration is at least 6 months, since moved here.    Still having hallucinations every night before she goes to bed. Sees shadows walk past bed. No AH. Sometimes sees spiders during the day on the floor; happens once or twice a week.     Seroquel helped to some extent.     Again, patient is somewhat of a poor historian. Often times she will answer a question with \"no,\" and then contradict her self moments later.    Collateral from Meagan: patient did have a UTI (UA confirmed); was put on a course of abx. Feels her mom's confusion has improved since then.      Past Surgical History:  Past Surgical History:   Procedure Laterality Date   • CATARACT EXTRACTION  2002   • CHOLECYSTECTOMY OPEN  1971       Problem List:  Patient Active Problem List   Diagnosis   • Forgetfulness   • Anxiety   • Depression   • Esophageal reflux   • Hallucinations   • Head injury   • Hypertension   • Other acute sinusitis   • PTSD (post-traumatic stress disorder)   • Mixed hyperlipidemia   • Acquired hypothyroidism   • Vitamin D deficiency       Allergy:   Allergies   Allergen Reactions   • Floxin Otic [Ofloxacin] Rash        Discontinued Medications:  There are no discontinued medications.    Current Medications:   Current Outpatient Medications   Medication Sig Dispense Refill   • Ascorbic Acid (Vitamin C) 250 MG chewable tablet Chew.     • aspirin 81 MG EC tablet aspirin 81 mg oral tablet,delayed release (DR/EC) take 1 tablet (81 mg) by " oral route once daily   Active     • atorvastatin (LIPITOR) 40 MG tablet Take 1 tablet by mouth Daily. 90 tablet 0   • clindamycin (CLEOCIN T) 1 % external solution      • ergocalciferol (ERGOCALCIFEROL) 1.25 MG (08246 UT) capsule Take 1 capsule by mouth 1 (One) Time Per Week. 13 capsule 0   • gabapentin (NEURONTIN) 600 MG tablet gabapentin 600 mg oral tablet take .5 tablet (300 mg) by oral route 2 times per day   Active     • levothyroxine (SYNTHROID, LEVOTHROID) 75 MCG tablet Take 1 tablet by mouth Daily. 90 tablet 1   • lisinopril (PRINIVIL,ZESTRIL) 10 MG tablet TAKE ONE TABLET BY MOUTH ONCE DAILY 30 tablet 0   • metoprolol tartrate (LOPRESSOR) 25 MG tablet metoprolol tartrate 25 mg oral tablet take 2 tablets (50 mg) by oral route once daily   Active     • multivitamin with minerals (DAILY MULTI PO) Take 1 tablet by mouth Daily.     • omeprazole (priLOSEC) 40 MG capsule Take 1 capsule by mouth Daily. 90 capsule 1   • QUEtiapine (SEROquel) 25 MG tablet Take 2 tablets by mouth Every Night. 60 tablet 2   • venlafaxine (EFFEXOR) 37.5 MG tablet Take 3 tablets by mouth Daily. 90 tablet 2   • Zinc Sulfate (ZINC 15 PO) Take  by mouth.     • busPIRone (BUSPAR) 10 MG tablet Take 1 tablet by mouth Daily. At 3 pm 30 tablet 2     No current facility-administered medications for this visit.       Past Medical History:  Past Medical History:   Diagnosis Date   • Acid reflux    • Allergies    • Anxiety    • Depression    • Forgetfulness    • Hallucinations 04/15/2021   • Head injury    • HTN (hypertension)    • PTSD (post-traumatic stress disorder)          Social History     Socioeconomic History   • Marital status: Unknown   Tobacco Use   • Smoking status: Never Smoker   • Smokeless tobacco: Never Used   Vaping Use   • Vaping Use: Never used   Substance and Sexual Activity   • Alcohol use: Never   • Drug use: Never   • Sexual activity: Not Currently         Family History   Problem Relation Age of Onset   • Stroke Mother    •  "Heart disease Mother    • Stroke Father    • Heart disease Father    • Diabetes Father    • Stroke Sister    • Diabetes Sister    • Stroke Brother    • Diabetes Brother    • Diabetes Other         AUNT/UNCLE   • Anxiety disorder Other        Mental Status Exam:   Hygiene:   good, at home  Cooperation:  Cooperative  Eye Contact:  Good  Psychomotor Behavior:  Appropriate  Affect:  Appropriate, mood congruent, not depressed at all  Mood: \"Depressed in the afternoons at 4 PM\"  Speech:  Normal  Thought Process:  Goal directed  Thought Content:  Normal and Mood congruent  Suicidal:  None  Homicidal:  None  Hallucinations:  None  Delusion:  None  Memory:  Deficits  Orientation:  Grossly intact  Reliability:  fair  Insight:  Fair  Judgement:  Fair  Impulse Control:  Fair  Physical/Medical Issues:  No      Review of Systems:  Review of Systems   Constitutional: Positive for fatigue. Negative for diaphoresis.   HENT: Negative for drooling.    Eyes: Negative for visual disturbance.   Respiratory: Positive for cough. Negative for shortness of breath.    Cardiovascular: Negative for chest pain, palpitations and leg swelling.   Gastrointestinal: Negative for nausea and vomiting.   Endocrine: Positive for heat intolerance. Negative for cold intolerance.   Genitourinary: Negative for difficulty urinating.   Musculoskeletal: Negative for joint swelling.   Allergic/Immunologic: Negative for immunocompromised state.   Neurological: Negative for dizziness and seizures.   Hematological: Negative for adenopathy.   Psychiatric/Behavioral: Positive for sleep disturbance.         Physical Exam:  Physical Exam    Vital Signs:   There were no vitals taken for this visit.     Lab Results:   Office Visit on 10/08/2021   Component Date Value Ref Range Status   • Glucose 10/08/2021 81  65 - 99 mg/dL Final   • BUN 10/08/2021 19  8 - 23 mg/dL Final   • Creatinine 10/08/2021 1.00  0.57 - 1.00 mg/dL Final   • Sodium 10/08/2021 144  136 - 145 mmol/L " Final   • Potassium 10/08/2021 4.4  3.5 - 5.2 mmol/L Final   • Chloride 10/08/2021 105  98 - 107 mmol/L Final   • CO2 10/08/2021 29.4 (A) 22.0 - 29.0 mmol/L Final   • Calcium 10/08/2021 9.2  8.6 - 10.5 mg/dL Final   • Total Protein 10/08/2021 6.9  6.0 - 8.5 g/dL Final   • Albumin 10/08/2021 4.10  3.50 - 5.20 g/dL Final   • ALT (SGPT) 10/08/2021 9  1 - 33 U/L Final   • AST (SGOT) 10/08/2021 17  1 - 32 U/L Final   • Alkaline Phosphatase 10/08/2021 114  39 - 117 U/L Final   • Total Bilirubin 10/08/2021 0.3  0.0 - 1.2 mg/dL Final   • eGFR Non  Amer 10/08/2021 55 (A) >60 mL/min/1.73 Final   • eGFR   Amer 10/08/2021 66  >60 mL/min/1.73 Final   • Globulin 10/08/2021 2.8  gm/dL Final   • A/G Ratio 10/08/2021 1.5  g/dL Final   • BUN/Creatinine Ratio 10/08/2021 19.0  7.0 - 25.0 Final   • Anion Gap 10/08/2021 9.6  5.0 - 15.0 mmol/L Final   • TSH 10/08/2021 0.955  0.270 - 4.200 uIU/mL Final   • Free T4 10/08/2021 1.24  0.93 - 1.70 ng/dL Final   • Total Cholesterol 10/08/2021 141  0 - 200 mg/dL Final   • Triglycerides 10/08/2021 158 (A) 0 - 150 mg/dL Final   • HDL Cholesterol 10/08/2021 38 (A) 40 - 60 mg/dL Final   • LDL Cholesterol  10/08/2021 76  0 - 100 mg/dL Final   • VLDL Cholesterol 10/08/2021 27  5 - 40 mg/dL Final   • LDL/HDL Ratio 10/08/2021 1.88   Final   • 25 Hydroxy, Vitamin D 10/08/2021 34.9  ng/ml Final   • WBC 10/08/2021 5.93  3.40 - 10.80 10*3/mm3 Final   • RBC 10/08/2021 4.68  3.77 - 5.28 10*6/mm3 Final   • Hemoglobin 10/08/2021 12.5  12.0 - 15.9 g/dL Final   • Hematocrit 10/08/2021 39.0  34.0 - 46.6 % Final   • MCV 10/08/2021 83.3  79.0 - 97.0 fL Final   • MCH 10/08/2021 26.7  26.6 - 33.0 pg Final   • MCHC 10/08/2021 32.1  31.5 - 35.7 g/dL Final   • RDW 10/08/2021 14.8  12.3 - 15.4 % Final   • RDW-SD 10/08/2021 44.5  37.0 - 54.0 fl Final   • MPV 10/08/2021 10.7  6.0 - 12.0 fL Final   • Platelets 10/08/2021 304  140 - 450 10*3/mm3 Final   • Neutrophil % 10/08/2021 76.9 (A) 42.7 - 76.0 % Final   •  Lymphocyte % 10/08/2021 13.7 (A) 19.6 - 45.3 % Final   • Monocyte % 10/08/2021 6.2  5.0 - 12.0 % Final   • Eosinophil % 10/08/2021 2.2  0.3 - 6.2 % Final   • Basophil % 10/08/2021 0.5  0.0 - 1.5 % Final   • Immature Grans % 10/08/2021 0.5  0.0 - 0.5 % Final   • Neutrophils, Absolute 10/08/2021 4.56  1.70 - 7.00 10*3/mm3 Final   • Lymphocytes, Absolute 10/08/2021 0.81  0.70 - 3.10 10*3/mm3 Final   • Monocytes, Absolute 10/08/2021 0.37  0.10 - 0.90 10*3/mm3 Final   • Eosinophils, Absolute 10/08/2021 0.13  0.00 - 0.40 10*3/mm3 Final   • Basophils, Absolute 10/08/2021 0.03  0.00 - 0.20 10*3/mm3 Final   • Immature Grans, Absolute 10/08/2021 0.03  0.00 - 0.05 10*3/mm3 Final   • nRBC 10/08/2021 0.0  0.0 - 0.2 /100 WBC Final       EKG Results:  No orders to display       Imaging Results:  No Images in the past 120 days found..      Assessment/Plan   Diagnoses and all orders for this visit:    1. Generalized anxiety disorder (Primary)  -     busPIRone (BUSPAR) 10 MG tablet; Take 1 tablet by mouth Daily. At 3 pm  Dispense: 30 tablet; Refill: 2    2. Major depressive disorder, recurrent episode, moderate (HCC)    3. Hallucinations    4. Dementia without behavioral disturbance, unspecified dementia type (HCC)    5. Forgetfulness      Presentation most consistent with developing dementia, likely Lewy body dementia.  Patient also reports worsening gait abnormalities; she fell in the summer of last year, injuring her head, and requiring stitches.  Patient also has a history of depression, possible PTSD by review of chart.  Patient did not mention PTSD during the interview, but she is not a very good historian.  Often times she will say no to a question, and then contradict herself only a few moments later.  She has some trouble with knowing her medications or her medical conditions.    3/8: Target the feeling of anxiety at 4 PM by starting BuSpar at 3 PM.  Consider switching to gabapentin if BuSpar does not help.  Would start at a  low dose of 100 mg.  16 minutes of supportive psychotherapy with goal to strengthen defenses, promote problems solving, restore adaptive functioning and provide symptom relief. The therapeutic alliance was strengthened to encourage the patient to express their thoughts and feelings. Esteem building was enhanced through praise, reassurance, normalizing and encouragement. Coping skills were enhanced to build distress tolerance skills and emotional regulation. Patient given education on medication side effects, diagnosis/illness and relapse symptoms. Plan to continue supportive psychotherapy in next appointment to provide symptom relief.  5 weeks    11/8: Depressed in the mornings. Light box, increase venlafaxine. 17 minutes of supportive psychotherapy with goal to strengthen defenses, promote problems solving, restore adaptive functioning and provide symptom relief. The therapeutic alliance was strengthened to encourage the patient to express their thoughts and feelings. Esteem building was enhanced through praise, reassurance, normalizing and encouragement. Coping skills were enhanced to build distress tolerance skills and emotional regulation. Patient given education on medication side effects, diagnosis/illness and relapse symptoms. Plan to continue supportive psychotherapy in next appointment to provide symptom relief. 8 wks      9/7: Doing even better.  8 weeks.  No changes.    7/27: Markedly improved, and far more linear.  Still has residual anxiety and depression.  Hallucinations are still present but they do not cause distress at all to the patient.  Continue Seroquel at the present dose.  Double the dose of venlafaxine. See back in 6 weeks.  This was also discussed with Meagan, patient's daughter.    Previous:  Caution again advised in terms of increasing the dose given the likely sensitivity she has to antipsychotics.  Psychotherapy is deferred at this time.  S/p referral to neurologist is appropriate for  further management.     Visit Diagnoses:    ICD-10-CM ICD-9-CM   1. Generalized anxiety disorder  F41.1 300.02   2. Major depressive disorder, recurrent episode, moderate (HCC)  F33.1 296.32   3. Hallucinations  R44.3 780.1   4. Dementia without behavioral disturbance, unspecified dementia type (HCC)  F03.90 294.20   5. Forgetfulness  R68.89 780.99       PLAN:  22. Risk Assessment: Risk of self-harm acutely is low. Risk factors include anxiety disorder, depressive disorder, access to weapons, recent psychosocial stressors (pandemic). Protective factors include no family history, no present SI, no history of suicide attempts or self-harm in the past, no access to weapons, no AODA, healthcare seeking, future orientation, willingness to engage in care. Risk of self-harm chronically is also low, but could be further elevated in the event of treatment noncompliance and/or AODA.  23. Safety: No acute safety concerns.  24. Medications:   a. CONTINUE quetiapine 50 mg nightly. Risks, benefits, alternatives discussed with patient including nausea and vomiting, GI upset, sedation, akathisia, hypotension, increased appetite, lowering of seizure threshold, theoretical risk of tardive dyskinesia, extrapyramidal symptoms, restless legs syndrome. After discussion of these risks and benefits, the patient voiced understanding and agreed to proceed.  b. STATUS POST sertraline 100 mg daily.  c. CONTINUE venlafaxine 112.5 mg PO QDAY. Risks, benefits, alternatives discussed with patient including GI upset, nausea vomiting diarrhea, theoretical decrease of seizure threshold predisposing the patient to a slightly higher seizure risk, headaches, sexual dysfunction, serotonin syndrome, bleeding risk, increased suicidality in patients 24 years and younger.  After discussion of these risks and benefits, the patient voiced understanding and agreed to proceed.  d. START BuSpar 10 mg at 3 PM daily. Risks, benefits, alternatives discussed with  patient including nausea, GI upset, mild sedation, falls risk.  After discussion of these risks and benefits, the patient voiced understanding and agreed to proceed.  25. Therapy: Deferred.  26. Other: s/p referral to neurology for workup of likely dementia.  27. Follow Up: 5 wks.      TREATMENT PLAN/GOALS: Continue supportive psychotherapy efforts and medications as indicated. Treatment and medication options discussed during today's visit. Patient ackowledged and verbally consented to continue with current treatment plan and was educated on the importance of compliance with treatment and follow-up appointments.    MEDICATION ISSUES:  ERNESTINA reviewed as expected.  Discussed medication options and treatment plan of prescribed medication as well as the risks, benefits, and side effects including potential falls, possible impaired driving and metabolic adversities among others. Patient is agreeable to call the office with any worsening of symptoms or onset of side effects. Patient is agreeable to call 911 or go to the nearest ER should he/she begin having SI/HI. No medication side effects or related complaints today.     MEDS ORDERED DURING VISIT:  New Medications Ordered This Visit   Medications   • busPIRone (BUSPAR) 10 MG tablet     Sig: Take 1 tablet by mouth Daily. At 3 pm     Dispense:  30 tablet     Refill:  2       Return in about 5 weeks (around 4/12/2022).         This document has been electronically signed by Riki Peterson MD  March 8, 2022 08:53 EST      Part of this note may be an electronic transcription/translation of spoken language to printed text using the Dragon Dictation System.

## 2022-03-14 ENCOUNTER — OFFICE VISIT (OUTPATIENT)
Dept: INTERNAL MEDICINE | Facility: CLINIC | Age: 72
End: 2022-03-14

## 2022-03-14 VITALS
WEIGHT: 163.4 LBS | HEIGHT: 63 IN | RESPIRATION RATE: 18 BRPM | OXYGEN SATURATION: 98 % | SYSTOLIC BLOOD PRESSURE: 146 MMHG | TEMPERATURE: 98.5 F | DIASTOLIC BLOOD PRESSURE: 84 MMHG | HEART RATE: 78 BPM | BODY MASS INDEX: 28.95 KG/M2

## 2022-03-14 DIAGNOSIS — E55.9 VITAMIN D DEFICIENCY: Chronic | ICD-10-CM

## 2022-03-14 DIAGNOSIS — M79.2 NEUROPATHIC PAIN: ICD-10-CM

## 2022-03-14 DIAGNOSIS — I10 PRIMARY HYPERTENSION: Primary | ICD-10-CM

## 2022-03-14 DIAGNOSIS — E78.2 MIXED HYPERLIPIDEMIA: Chronic | ICD-10-CM

## 2022-03-14 DIAGNOSIS — E03.9 ACQUIRED HYPOTHYROIDISM: Chronic | ICD-10-CM

## 2022-03-14 PROCEDURE — 99214 OFFICE O/P EST MOD 30 MIN: CPT | Performed by: NURSE PRACTITIONER

## 2022-03-14 PROCEDURE — 82306 VITAMIN D 25 HYDROXY: CPT | Performed by: NURSE PRACTITIONER

## 2022-03-14 PROCEDURE — 84443 ASSAY THYROID STIM HORMONE: CPT | Performed by: NURSE PRACTITIONER

## 2022-03-14 PROCEDURE — 80061 LIPID PANEL: CPT | Performed by: NURSE PRACTITIONER

## 2022-03-14 PROCEDURE — 85025 COMPLETE CBC W/AUTO DIFF WBC: CPT | Performed by: NURSE PRACTITIONER

## 2022-03-14 PROCEDURE — 80053 COMPREHEN METABOLIC PANEL: CPT | Performed by: NURSE PRACTITIONER

## 2022-03-14 PROCEDURE — 80305 DRUG TEST PRSMV DIR OPT OBS: CPT | Performed by: NURSE PRACTITIONER

## 2022-03-14 RX ORDER — ATORVASTATIN CALCIUM 40 MG/1
40 TABLET, FILM COATED ORAL DAILY
Qty: 90 TABLET | Refills: 1 | Status: SHIPPED | OUTPATIENT
Start: 2022-03-14 | End: 2022-09-13

## 2022-03-14 RX ORDER — ERGOCALCIFEROL 1.25 MG/1
50000 CAPSULE ORAL WEEKLY
Qty: 13 CAPSULE | Refills: 1 | Status: SHIPPED | OUTPATIENT
Start: 2022-03-14 | End: 2022-09-29

## 2022-03-14 RX ORDER — LISINOPRIL 10 MG/1
10 TABLET ORAL DAILY
Qty: 90 TABLET | Refills: 1 | Status: SHIPPED | OUTPATIENT
Start: 2022-03-14 | End: 2022-09-13

## 2022-03-14 RX ORDER — LEVOTHYROXINE SODIUM 0.07 MG/1
75 TABLET ORAL DAILY
Qty: 90 TABLET | Refills: 1 | Status: SHIPPED | OUTPATIENT
Start: 2022-03-14 | End: 2022-10-11

## 2022-03-14 NOTE — PROGRESS NOTES
"Chief Complaint  Follow-up, Hypertension, Hypothyroidism, and Hyperlipidemia    Subjective          Payal Singer presents to [facility]  History of Present Illness  She would like to restart the gabapentin for her scalp. She had decreased the amount she was taking to 300mg bid.  She was previously started on this by dermatology after having chronic scalp pain following thorough eval and biopsies    HLD-no leg cramps  HTN-not checking at home routinely.  Denies chest pain, headache  Anxiety, depression-doing well with current medications  Objective   Vital Signs:   /84 (BP Location: Right arm, Patient Position: Sitting, Cuff Size: Adult)   Pulse 78   Temp 98.5 °F (36.9 °C)   Resp 18   Ht 160 cm (63\")   Wt 74.1 kg (163 lb 6.4 oz)   SpO2 98%   BMI 28.95 kg/m²     Physical Exam  Vitals and nursing note reviewed.   Constitutional:       General: She is not in acute distress.     Appearance: Normal appearance.   HENT:      Head: Normocephalic and atraumatic.      Right Ear: External ear normal.      Left Ear: External ear normal.      Nose: Nose normal.      Mouth/Throat:      Mouth: Mucous membranes are moist.   Eyes:      Conjunctiva/sclera: Conjunctivae normal.   Cardiovascular:      Rate and Rhythm: Normal rate and regular rhythm.      Pulses: Normal pulses.      Heart sounds: Normal heart sounds. No murmur heard.    No friction rub. No gallop.   Pulmonary:      Effort: Pulmonary effort is normal. No respiratory distress.      Breath sounds: No wheezing, rhonchi or rales.   Abdominal:      Tenderness: There is no abdominal tenderness.   Musculoskeletal:      Cervical back: Neck supple.      Right lower leg: No edema.      Left lower leg: No edema.   Lymphadenopathy:      Cervical: No cervical adenopathy.   Skin:     General: Skin is warm and dry.   Neurological:      General: No focal deficit present.      Mental Status: She is alert and oriented to person, " place, and time.   Psychiatric:         Mood and Affect: Mood normal.         Behavior: Behavior normal.        Result Review :          Procedures      Assessment and Plan    Diagnoses and all orders for this visit:    1. Primary hypertension (Primary)  Assessment & Plan:  Discussed goal of less than 130/80.  Discussed elevated blood pressure at today's visit.  Discussed risks of blood pressure elevation including death, heart attack, stroke, chronic kidney disease, blindness.  Follow a low-salt diet and increase exercise.  Discussed importance of medication compliance and avoidance of missing doses.  Continue current meds at this time.  We will monitor at follow-up and adjust blood pressure medications if necessary.  ER with chest pain, palpitations, vision loss, unilateral weakness, or altered mental status.  Patient verbalized understanding    Orders:  -     Comprehensive Metabolic Panel  -     CBC & Differential  -     Lipid Panel    2. Mixed hyperlipidemia  Assessment & Plan:  We will check labs in the office today.  Will review and adjust medications as needed    Orders:  -     Lipid Panel    3. Acquired hypothyroidism  Comments:  We will check labs in the office today.  Will review and adjust medications as needed  Orders:  -     TSH    4. Vitamin D deficiency  Comments:  We will check labs in the office today.  Will review and adjust medications as needed  Orders:  -     Vitamin D 25 Hydroxy    5. Neuropathic pain  Comments:  of scalp, chronic. will restart gabapentin--UDS, controlled substance agreement, and Mehdi reviewed today.    Other orders  -     atorvastatin (LIPITOR) 40 MG tablet; Take 1 tablet by mouth Daily.  Dispense: 90 tablet; Refill: 1  -     ergocalciferol (ERGOCALCIFEROL) 1.25 MG (23002 UT) capsule; Take 1 capsule by mouth 1 (One) Time Per Week.  Dispense: 13 capsule; Refill: 1  -     levothyroxine (SYNTHROID, LEVOTHROID) 75 MCG tablet; Take 1 tablet by mouth Daily.  Dispense: 90 tablet;  Refill: 1  -     lisinopril (PRINIVIL,ZESTRIL) 10 MG tablet; Take 1 tablet by mouth Daily.  Dispense: 90 tablet; Refill: 1            Follow Up   Return in about 3 months (around 6/14/2022).  Patient was given instructions and counseling regarding her condition or for health maintenance advice. Please see specific information pulled into the AVS if appropriate.

## 2022-03-14 NOTE — ASSESSMENT & PLAN NOTE
Discussed goal of less than 130/80.  Discussed elevated blood pressure at today's visit.  Discussed risks of blood pressure elevation including death, heart attack, stroke, chronic kidney disease, blindness.  Follow a low-salt diet and increase exercise.  Discussed importance of medication compliance and avoidance of missing doses.  Continue current meds at this time.  We will monitor at follow-up and adjust blood pressure medications if necessary.  ER with chest pain, palpitations, vision loss, unilateral weakness, or altered mental status.  Patient verbalized understanding

## 2022-03-15 LAB
25(OH)D3 SERPL-MCNC: 44.1 NG/ML (ref 30–100)
ALBUMIN SERPL-MCNC: 4.1 G/DL (ref 3.5–5.2)
ALBUMIN/GLOB SERPL: 1.2 G/DL
ALP SERPL-CCNC: 125 U/L (ref 39–117)
ALT SERPL W P-5'-P-CCNC: 13 U/L (ref 1–33)
ANION GAP SERPL CALCULATED.3IONS-SCNC: 12 MMOL/L (ref 5–15)
AST SERPL-CCNC: 15 U/L (ref 1–32)
BASOPHILS # BLD AUTO: 0.04 10*3/MM3 (ref 0–0.2)
BASOPHILS NFR BLD AUTO: 0.5 % (ref 0–1.5)
BILIRUB SERPL-MCNC: 0.2 MG/DL (ref 0–1.2)
BUN SERPL-MCNC: 15 MG/DL (ref 8–23)
BUN/CREAT SERPL: 13.8 (ref 7–25)
CALCIUM SPEC-SCNC: 9.7 MG/DL (ref 8.6–10.5)
CHLORIDE SERPL-SCNC: 102 MMOL/L (ref 98–107)
CHOLEST SERPL-MCNC: 169 MG/DL (ref 0–200)
CO2 SERPL-SCNC: 28 MMOL/L (ref 22–29)
CREAT SERPL-MCNC: 1.09 MG/DL (ref 0.57–1)
DEPRECATED RDW RBC AUTO: 39.7 FL (ref 37–54)
EGFRCR SERPLBLD CKD-EPI 2021: 53.8 ML/MIN/1.73
EOSINOPHIL # BLD AUTO: 0.11 10*3/MM3 (ref 0–0.4)
EOSINOPHIL NFR BLD AUTO: 1.4 % (ref 0.3–6.2)
ERYTHROCYTE [DISTWIDTH] IN BLOOD BY AUTOMATED COUNT: 13.2 % (ref 12.3–15.4)
GLOBULIN UR ELPH-MCNC: 3.5 GM/DL
GLUCOSE SERPL-MCNC: 94 MG/DL (ref 65–99)
HCT VFR BLD AUTO: 38.7 % (ref 34–46.6)
HDLC SERPL-MCNC: 46 MG/DL (ref 40–60)
HGB BLD-MCNC: 12.6 G/DL (ref 12–15.9)
IMM GRANULOCYTES # BLD AUTO: 0.03 10*3/MM3 (ref 0–0.05)
IMM GRANULOCYTES NFR BLD AUTO: 0.4 % (ref 0–0.5)
LDLC SERPL CALC-MCNC: 90 MG/DL (ref 0–100)
LDLC/HDLC SERPL: 1.82 {RATIO}
LYMPHOCYTES # BLD AUTO: 0.9 10*3/MM3 (ref 0.7–3.1)
LYMPHOCYTES NFR BLD AUTO: 11.2 % (ref 19.6–45.3)
MCH RBC QN AUTO: 27 PG (ref 26.6–33)
MCHC RBC AUTO-ENTMCNC: 32.6 G/DL (ref 31.5–35.7)
MCV RBC AUTO: 82.9 FL (ref 79–97)
MONOCYTES # BLD AUTO: 0.53 10*3/MM3 (ref 0.1–0.9)
MONOCYTES NFR BLD AUTO: 6.6 % (ref 5–12)
NEUTROPHILS NFR BLD AUTO: 6.42 10*3/MM3 (ref 1.7–7)
NEUTROPHILS NFR BLD AUTO: 79.9 % (ref 42.7–76)
NRBC BLD AUTO-RTO: 0 /100 WBC (ref 0–0.2)
PLATELET # BLD AUTO: 305 10*3/MM3 (ref 140–450)
PMV BLD AUTO: 11.2 FL (ref 6–12)
POTASSIUM SERPL-SCNC: 3.8 MMOL/L (ref 3.5–5.2)
PROT SERPL-MCNC: 7.6 G/DL (ref 6–8.5)
RBC # BLD AUTO: 4.67 10*6/MM3 (ref 3.77–5.28)
SODIUM SERPL-SCNC: 142 MMOL/L (ref 136–145)
TRIGL SERPL-MCNC: 197 MG/DL (ref 0–150)
TSH SERPL DL<=0.05 MIU/L-ACNC: 1.04 UIU/ML (ref 0.27–4.2)
VLDLC SERPL-MCNC: 33 MG/DL (ref 5–40)
WBC NRBC COR # BLD: 8.03 10*3/MM3 (ref 3.4–10.8)

## 2022-03-16 ENCOUNTER — TELEPHONE (OUTPATIENT)
Dept: INTERNAL MEDICINE | Facility: CLINIC | Age: 72
End: 2022-03-16

## 2022-03-16 NOTE — TELEPHONE ENCOUNTER
Caller: Payal Singer    Relationship: Self    Best call back number: [phone]     Requested Prescriptions:     GABAPENTIN      Requested Prescriptions      No prescriptions requested or ordered in this encounter        Pharmacy where request should be sent: [facility] #[id number] - [place], [place] - [address] [facility] - [phone]  - [phone] FX     Additional details provided by patient: PATIENT WOULD LIKE A CALL BACK ASAP    Does the patient have less than a 3 day supply:  [x] Yes  [] No    Alice SHEFFIELD Rep   03/16/22 14:54 EDT

## 2022-03-16 NOTE — TELEPHONE ENCOUNTER
Miranda--do not send gabapentin    I sent gabapentin in for her the other day during her visit.  There is no Mehdi for me to review.  Please print a Mehdi manually and place on my desk.  Also, please check with the pharmacy to see if they received this prescription and if she has picked this up.

## 2022-03-16 NOTE — TELEPHONE ENCOUNTER
Patient's daughter is calling regarding her Gabapentin. I see in the office note from 03/14/22 you mention of restarting it, but it is not on her medication list. I do not have it pended since I'm unsure of the dosing. Pharmacy is in file, [facility].

## 2022-03-17 RX ORDER — GABAPENTIN 300 MG/1
300 CAPSULE ORAL 2 TIMES DAILY
Qty: 60 CAPSULE | Refills: 0 | Status: SHIPPED | OUTPATIENT
Start: 2022-03-17 | End: 2022-04-18 | Stop reason: SDUPTHER

## 2022-03-17 NOTE — TELEPHONE ENCOUNTER
Red rule verified and correct.    Spoke with daughter.  Still waiting on the gabapentin.    She has been out x 2 days.    Unable to see that it was sent.

## 2022-03-17 NOTE — TELEPHONE ENCOUNTER
Spoke with [facility] pharmacist he said that they never received a script for the Gabapentin there.

## 2022-03-17 NOTE — TELEPHONE ENCOUNTER
Please let the patient know that I am sorry that was my mistake. It has now been sent to the pharmacy

## 2022-04-04 RX ORDER — OMEPRAZOLE 40 MG/1
40 CAPSULE, DELAYED RELEASE ORAL DAILY
Qty: 90 CAPSULE | Refills: 1 | Status: SHIPPED | OUTPATIENT
Start: 2022-04-04 | End: 2022-11-11 | Stop reason: SDUPTHER

## 2022-04-15 DIAGNOSIS — M79.2 NEUROPATHIC PAIN: ICD-10-CM

## 2022-04-16 DIAGNOSIS — M79.2 NEUROPATHIC PAIN: ICD-10-CM

## 2022-04-18 ENCOUNTER — PATIENT MESSAGE (OUTPATIENT)
Dept: INTERNAL MEDICINE | Facility: CLINIC | Age: 72
End: 2022-04-18

## 2022-04-18 DIAGNOSIS — M79.2 NEUROPATHIC PAIN: ICD-10-CM

## 2022-04-18 RX ORDER — GABAPENTIN 300 MG/1
300 CAPSULE ORAL 2 TIMES DAILY
Qty: 60 CAPSULE | Refills: 0 | Status: SHIPPED | OUTPATIENT
Start: 2022-04-18 | End: 2022-05-16 | Stop reason: SDUPTHER

## 2022-04-18 RX ORDER — GABAPENTIN 300 MG/1
300 CAPSULE ORAL 2 TIMES DAILY
Qty: 60 CAPSULE | Refills: 0 | OUTPATIENT
Start: 2022-04-18

## 2022-04-18 RX ORDER — GABAPENTIN 300 MG/1
CAPSULE ORAL
Qty: 60 CAPSULE | Refills: 0 | OUTPATIENT
Start: 2022-04-18

## 2022-04-18 NOTE — TELEPHONE ENCOUNTER
From: Payal Singer  To: BREE Cohen  Sent: 4/18/2022 10:01 AM EDT  Subject: Medication refill request     Hello! I have been trying since last Thursday to get my moms Gabapentin refilled. She is now out. Can you please put that approval through to [facility]. Thank you, Meagan Morrison.

## 2022-04-19 ENCOUNTER — TELEMEDICINE (OUTPATIENT)
Dept: PSYCHIATRY | Facility: CLINIC | Age: 72
End: 2022-04-19

## 2022-04-19 DIAGNOSIS — R44.3 HALLUCINATIONS: ICD-10-CM

## 2022-04-19 DIAGNOSIS — F41.1 GENERALIZED ANXIETY DISORDER: Primary | ICD-10-CM

## 2022-04-19 DIAGNOSIS — F33.1 MAJOR DEPRESSIVE DISORDER, RECURRENT EPISODE, MODERATE: ICD-10-CM

## 2022-04-19 DIAGNOSIS — F03.90 DEMENTIA WITHOUT BEHAVIORAL DISTURBANCE, UNSPECIFIED DEMENTIA TYPE: ICD-10-CM

## 2022-04-19 PROCEDURE — 99214 OFFICE O/P EST MOD 30 MIN: CPT | Performed by: STUDENT IN AN ORGANIZED HEALTH CARE EDUCATION/TRAINING PROGRAM

## 2022-04-19 RX ORDER — VENLAFAXINE 37.5 MG/1
112.5 TABLET ORAL DAILY
Qty: 90 TABLET | Refills: 2 | Status: SHIPPED | OUTPATIENT
Start: 2022-05-11 | End: 2022-07-11

## 2022-04-19 RX ORDER — QUETIAPINE FUMARATE 25 MG/1
50 TABLET, FILM COATED ORAL NIGHTLY
Qty: 60 TABLET | Refills: 2 | Status: SHIPPED | OUTPATIENT
Start: 2022-05-11 | End: 2022-07-11

## 2022-04-19 RX ORDER — BUSPIRONE HYDROCHLORIDE 10 MG/1
10 TABLET ORAL DAILY
Qty: 30 TABLET | Refills: 2 | Status: SHIPPED | OUTPATIENT
Start: 2022-06-08 | End: 2022-07-19 | Stop reason: SDUPTHER

## 2022-04-19 NOTE — PATIENT INSTRUCTIONS
1.  Please return to clinic at your next scheduled visit.  Contact the clinic ([phone]) at least 24 hours prior in the event you need to cancel.  2.  Do no harm to yourself or others.    3.  Avoid alcohol and drugs.    4.  Take all medications as prescribed.  Please contact the clinic with any concerns. If you are in need of medication refills, please call the clinic at [phone].    5. Should you want to get in touch with your provider, Dr. Riki Peterson, please utilize [employer] or contact the office ([phone]), and staff will be able to page Dr. Peterson directly.  6.  In the event you have personal crisis, contact the following crisis numbers: Suicide Prevention Hotline 1-728.296.5095; [facility] Helpline 6-002-774-HGYL; [facility] Emergency Room [phone]; text HELLO to 451181; or 546.     SPECIFIC RECOMMENDATIONS:     1.      Medications discussed at this encounter:                   - no changes     2.      Psychotherapy recommendations:      3.     Return to clinic: 3 mos

## 2022-04-19 NOTE — PROGRESS NOTES
"Subjective   Payal Singer is a 72 y.o. female who presents today for follow up    Chief Complaint:  mdd meagan    History of Present Illness:     Chart review 4/15: Seen 3/30 to establish care. Hx of anx/dep avh in [place]. on both sertraline and venlafaxine, was on Seroquel. Hx of HTN, GERD. On gabapentin 600 tid, sertraline 200 d, venla 150 d, quetiap 25 qhs. March 2021 labs: cbc shows low mch, high rdw; Cr 1.15 high, abnormal lipids, TSH and fT4 wnl. No OP head imaging, ekg.     \"Meagan and Payal\"    4/19: Virtual visit via Zoom audio and video due to the COVID-19 pandemic.  Patient is accepting of and agreeable to visit.  The visit consisted of the patient and I. The patient is at home, and I am at the office.  Interview:  1. Chart review: Patient has now started gabapentin 300 mg twice daily.  This had previously been prescribed by dermatology for chronic scalp pain following biopsies.  She was seen by primary care March 14 and was doing well from a mental health standpoint.  CMP in March show slightly elevated creatinine 1.09, alk phos 125, otherwise reassuring CMP, thyroid studies, vitamin D, CBC.  Triglycerides are elevated.  2. \"Things are going good.\"  a. Gabapentin helps with anxiety. Morning and evening.  b. Buspar helps as well.  c. Sleeping well.   d. \"I believe I am where I need to be.\"  3. Refills: y  4. Therapy: n  5. Medication compliant: y  6. No SI HI AVH.      3/8: Virtual visit via Zoom audio and video due to the COVID-19 pandemic.  Patient is accepting of and agreeable to visit.  The visit consisted of the patient and I. The patient is at home, and I am at the office.  Interview:  7. Chart review: No new developments  8. \" I get depressed at 4:00 every day.\"  a. Lots of activity at this time.  Kids getting food, having to take care of the animals.  bEs Rhodes believes patient gets overwhelmed during this time.  After she mention this, the patient agreed.  c. Has never tried BuSpar.  Has been on " "gabapentin in the past, is not presently on it.  Was on it for neuropathic pain.  d. Otherwise depression and anxiety are extremely well controlled.  Increasing Effexor was helpful for the patient's anxiety.  e. Generally sleeping well.  9. Medication compliant: Yes  10. No SI HI AVH.      11/8: Virtual visit via Zoom audio and video due to the COVID-19 pandemic.  Patient is accepting of and agreeable to visit.  The visit consisted of the patient and I.  Interview:  11. Chart review: Seen by primary care October 8.  Hypertension well controlled.  Labs demonstrate elevated CO2 of 29.4 on CMP, creatinine is 1.00 normal, thyroid studies are normal, elevated triglycerides, normal CBC.  Patient underwent an MRI for hallucinations and it shows no acute/nothing.  12. \"Pretty good.\"  13. Depression/Mood: \"depressed in the afternoon.\"  a. Lifts after she takes her medication.  b. Seasonal component  c. [facility] and its weather, and her family.  1. Anhedonia: denies  2. Guilt or hopelessness: denies  3. Energy: down until the afternoon  4. Concentration: sometimes  5. Weight loss or weight gain: lost 20 lbs over a year  6. Psychomotor retardation or agitation: possible retardation  7. Insomnia: denies  14. Anxiety:  1. Uncontrolled worrying: yes  2. Muscle tension: sometimes  3. Fatigue: down until the afternoon  4. Concentration: sometimes  5. Restlessness/feeling on edge: sometimes in the day, not at night  6. Irritability: sometimes  7. Insomnia: denies  15. Sleeping: yes  16. Eating: well  17. Substances: denies  18. Therapy: deferred  19. Medication compliant: some issues with compliance (on accident)  a. Taking 150 mg of venlafaxine for the last week, rather than 75 mg due to pharmacy error.  20. No SI HI AVH.  a. Sees bugs, but they actually have a bug issue in the apartment. (ladybugs)  b. Meagan does not believe she is hallucinating.      9/7: Virtual visit via Zoom audio and video due to the COVID-19 pandemic.  " "Patient is accepting of and agreeable to visit.  The visit consisted of the patient and I.  Interview:  21. Records review shows no new developments.  22. \"Everything's pretty good.\"  23. No hallucinations at all. Might see a shadow.  24. Depression better.  25.  doing better, but has dizziness.  26. Meagan in agreement.  27. No SI HI AVH.      7/27: Virtual visit via Zoom audio and video due to the COVID-19 pandemic. Patient is accepting of and agreeable to appointment. The appointment consisted of the patient and I only.   Interview: \"Good\"   1. Having VH less frequently, once or twice a week. Like someone passes me real fast. Not experiencing spiders.  2. Sleeping well.  3.  doing better. Worrying is much better.  4. Medicine has been helpful.  5. Still feels depressed. Homesick for [place].   6. No panic attacks in last two weeks.  7. Much clearer today, linear. Better historian.  8. Meagan agrees.  9. No SI HI AVH.    Previous:  Still having panic attacks once a week. Sx: soa, sweating, palpitations, tachycardia, fear, no derealization/depersonalization, last 30 min, leaves room where she was at. No triggers. Fear of another panic attack happening.     Anxiety due to 's health; admitted to hospital recently for dehydration. Notes uncontrolled worrying, muscle tension, irritability, restlessness, trouble sleeping - will wake at 3 am and can't go back to sleep. Duration is at least 6 months, since moved here.    Still having hallucinations every night before she goes to bed. Sees shadows walk past bed. No AH. Sometimes sees spiders during the day on the floor; happens once or twice a week.     Seroquel helped to some extent.     Again, patient is somewhat of a poor historian. Often times she will answer a question with \"no,\" and then contradict her self moments later.    Collateral from Meagan: patient did have a UTI (UA confirmed); was put on a course of abx. Feels her mom's confusion has " improved since then.      Past Surgical History:  Past Surgical History:   Procedure Laterality Date   • CATARACT EXTRACTION  2002   • CHOLECYSTECTOMY OPEN  1971       Problem List:  Patient Active Problem List   Diagnosis   • Forgetfulness   • Anxiety   • Depression   • Esophageal reflux   • Hallucinations   • Head injury   • Hypertension   • Other acute sinusitis   • PTSD (post-traumatic stress disorder)   • Mixed hyperlipidemia   • Acquired hypothyroidism   • Vitamin D deficiency   • Neuropathic pain       Allergy:   Allergies   Allergen Reactions   • Floxin Otic [Ofloxacin] Rash        Discontinued Medications:  Medications Discontinued During This Encounter   Medication Reason   • venlafaxine (EFFEXOR) 37.5 MG tablet Reorder   • QUEtiapine (SEROquel) 25 MG tablet Reorder   • busPIRone (BUSPAR) 10 MG tablet Reorder       Current Medications:   Current Outpatient Medications   Medication Sig Dispense Refill   • Ascorbic Acid (Vitamin C) 250 MG chewable tablet Chew.     • aspirin 81 MG EC tablet aspirin 81 mg oral tablet,delayed release (DR/EC) take 1 tablet (81 mg) by oral route once daily   Active     • atorvastatin (LIPITOR) 40 MG tablet Take 1 tablet by mouth Daily. 90 tablet 1   • [START ON 6/8/2022] busPIRone (BUSPAR) 10 MG tablet Take 1 tablet by mouth Daily. At 3 pm 30 tablet 2   • clindamycin (CLEOCIN T) 1 % external solution      • ergocalciferol (ERGOCALCIFEROL) 1.25 MG (66683 UT) capsule Take 1 capsule by mouth 1 (One) Time Per Week. 13 capsule 1   • gabapentin (NEURONTIN) 300 MG capsule Take 1 capsule by mouth 2 (Two) Times a Day. 60 capsule 0   • levothyroxine (SYNTHROID, LEVOTHROID) 75 MCG tablet Take 1 tablet by mouth Daily. 90 tablet 1   • lisinopril (PRINIVIL,ZESTRIL) 10 MG tablet Take 1 tablet by mouth Daily. 90 tablet 1   • metoprolol tartrate (LOPRESSOR) 25 MG tablet metoprolol tartrate 25 mg oral tablet take 2 tablets (50 mg) by oral route once daily   Active     • multivitamin with minerals  "tablet tablet Take 1 tablet by mouth Daily.     • omeprazole (priLOSEC) 40 MG capsule Take 1 capsule by mouth Daily. 90 capsule 1   • [START ON 5/11/2022] QUEtiapine (SEROquel) 25 MG tablet Take 2 tablets by mouth Every Night. 60 tablet 2   • [START ON 5/11/2022] venlafaxine (EFFEXOR) 37.5 MG tablet Take 3 tablets by mouth Daily. 90 tablet 2   • Zinc Sulfate (ZINC 15 PO) Take  by mouth.       No current facility-administered medications for this visit.       Past Medical History:  Past Medical History:   Diagnosis Date   • Acid reflux    • Allergies    • Anxiety    • Depression    • Forgetfulness    • Hallucinations 04/15/2021   • Head injury    • HTN (hypertension)    • PTSD (post-traumatic stress disorder)          Social History     Socioeconomic History   • Marital status: Unknown   Tobacco Use   • Smoking status: Never Smoker   • Smokeless tobacco: Never Used   Vaping Use   • Vaping Use: Never used   Substance and Sexual Activity   • Alcohol use: Never   • Drug use: Never   • Sexual activity: Not Currently         Family History   Problem Relation Age of Onset   • Stroke Mother    • Heart disease Mother    • Stroke Father    • Heart disease Father    • Diabetes Father    • Stroke Sister    • Diabetes Sister    • Stroke Brother    • Diabetes Brother    • Diabetes Other         AUNT/UNCLE   • Anxiety disorder Other        Mental Status Exam:   Hygiene:   good, at home  Cooperation:  Cooperative  Eye Contact:  Good  Psychomotor Behavior:  Appropriate  Affect:  Appropriate, mood congruent  Mood: \"I feel we are in a good place\"  Speech:  Normal  Thought Process:  Goal directed  Thought Content:  Normal and Mood congruent  Suicidal:  None  Homicidal:  None  Hallucinations:  None  Delusion:  None  Memory:  Deficits  Orientation:  Grossly intact  Reliability:  fair  Insight:  Fair  Judgement:  Fair  Impulse Control:  Fair  Physical/Medical Issues:  No      Review of Systems:  Review of Systems   Constitutional: " Positive for fatigue. Negative for diaphoresis.   HENT: Negative for drooling.    Eyes: Negative for visual disturbance.   Respiratory: Positive for cough. Negative for shortness of breath.    Cardiovascular: Negative for chest pain, palpitations and leg swelling.   Gastrointestinal: Negative for nausea and vomiting.   Endocrine: Negative for cold intolerance and heat intolerance.   Genitourinary: Negative for difficulty urinating.   Musculoskeletal: Negative for joint swelling.   Allergic/Immunologic: Negative for immunocompromised state.   Neurological: Negative for dizziness and seizures.   Hematological: Negative for adenopathy.   Psychiatric/Behavioral: Negative for sleep disturbance.         Physical Exam:  Physical Exam    Vital Signs:   There were no vitals taken for this visit.     Lab Results:   Office Visit on 03/14/2022   Component Date Value Ref Range Status   • Glucose 03/14/2022 94  65 - 99 mg/dL Final   • BUN 03/14/2022 15  8 - 23 mg/dL Final   • Creatinine 03/14/2022 1.09 (A) 0.57 - 1.00 mg/dL Final   • Sodium 03/14/2022 142  136 - 145 mmol/L Final   • Potassium 03/14/2022 3.8  3.5 - 5.2 mmol/L Final   • Chloride 03/14/2022 102  98 - 107 mmol/L Final   • CO2 03/14/2022 28.0  22.0 - 29.0 mmol/L Final   • Calcium 03/14/2022 9.7  8.6 - 10.5 mg/dL Final   • Total Protein 03/14/2022 7.6  6.0 - 8.5 g/dL Final   • Albumin 03/14/2022 4.10  3.50 - 5.20 g/dL Final   • ALT (SGPT) 03/14/2022 13  1 - 33 U/L Final   • AST (SGOT) 03/14/2022 15  1 - 32 U/L Final   • Alkaline Phosphatase 03/14/2022 125 (A) 39 - 117 U/L Final   • Total Bilirubin 03/14/2022 0.2  0.0 - 1.2 mg/dL Final   • Globulin 03/14/2022 3.5  gm/dL Final   • A/G Ratio 03/14/2022 1.2  g/dL Final   • BUN/Creatinine Ratio 03/14/2022 13.8  7.0 - 25.0 Final   • Anion Gap 03/14/2022 12.0  5.0 - 15.0 mmol/L Final   • eGFR 03/14/2022 53.8 (A) >60.0 mL/min/1.73 Final    National Kidney Foundation and American Society of Nephrology (ASN) Task Force  recommended calculation based on the Chronic Kidney Disease Epidemiology Collaboration (CKD-EPI) equation refit without adjustment for race.   • TSH 03/14/2022 1.040  0.270 - 4.200 uIU/mL Final   • Total Cholesterol 03/14/2022 169  0 - 200 mg/dL Final   • Triglycerides 03/14/2022 197 (A) 0 - 150 mg/dL Final   • HDL Cholesterol 03/14/2022 46  40 - 60 mg/dL Final   • LDL Cholesterol  03/14/2022 90  0 - 100 mg/dL Final   • VLDL Cholesterol 03/14/2022 33  5 - 40 mg/dL Final   • LDL/HDL Ratio 03/14/2022 1.82   Final   • 25 Hydroxy, Vitamin D 03/14/2022 44.1  30.0 - 100.0 ng/ml Final   • WBC 03/14/2022 8.03  3.40 - 10.80 10*3/mm3 Final   • RBC 03/14/2022 4.67  3.77 - 5.28 10*6/mm3 Final   • Hemoglobin 03/14/2022 12.6  12.0 - 15.9 g/dL Final   • Hematocrit 03/14/2022 38.7  34.0 - 46.6 % Final   • MCV 03/14/2022 82.9  79.0 - 97.0 fL Final   • MCH 03/14/2022 27.0  26.6 - 33.0 pg Final   • MCHC 03/14/2022 32.6  31.5 - 35.7 g/dL Final   • RDW 03/14/2022 13.2  12.3 - 15.4 % Final   • RDW-SD 03/14/2022 39.7  37.0 - 54.0 fl Final   • MPV 03/14/2022 11.2  6.0 - 12.0 fL Final   • Platelets 03/14/2022 305  140 - 450 10*3/mm3 Final   • Neutrophil % 03/14/2022 79.9 (A) 42.7 - 76.0 % Final   • Lymphocyte % 03/14/2022 11.2 (A) 19.6 - 45.3 % Final   • Monocyte % 03/14/2022 6.6  5.0 - 12.0 % Final   • Eosinophil % 03/14/2022 1.4  0.3 - 6.2 % Final   • Basophil % 03/14/2022 0.5  0.0 - 1.5 % Final   • Immature Grans % 03/14/2022 0.4  0.0 - 0.5 % Final   • Neutrophils, Absolute 03/14/2022 6.42  1.70 - 7.00 10*3/mm3 Final   • Lymphocytes, Absolute 03/14/2022 0.90  0.70 - 3.10 10*3/mm3 Final   • Monocytes, Absolute 03/14/2022 0.53  0.10 - 0.90 10*3/mm3 Final   • Eosinophils, Absolute 03/14/2022 0.11  0.00 - 0.40 10*3/mm3 Final   • Basophils, Absolute 03/14/2022 0.04  0.00 - 0.20 10*3/mm3 Final   • Immature Grans, Absolute 03/14/2022 0.03  0.00 - 0.05 10*3/mm3 Final   • nRBC 03/14/2022 0.0  0.0 - 0.2 /100 WBC Final   • Amphetamine Screen, Urine  03/14/2022 Negative  Negative Final   • AMP INTERNAL CONTROL 03/14/2022 Passed  Passed Final   • Barbiturates Screen, Urine 03/14/2022 Negative  Negative Final   • BARBITURATE INTERNAL CONTROL 03/14/2022 Passed  Passed Final   • Buprenorphine, Screen, Urine 03/14/2022 Negative  Negative Final   • BUPRENORPHINE INTERNAL CONTROL 03/14/2022 Passed  Passed Final   • Benzodiazepine Screen, Urine 03/14/2022 Negative  Negative Final   • BENZODIAZEPINE INTERNAL CONTROL 03/14/2022 Passed  Passed Final   • Cocaine Screen, Urine 03/14/2022 Negative  Negative Final   • COCAINE INTERNAL CONTROL 03/14/2022 Passed  Passed Final   • MDMA (ECSTASY) 03/14/2022 Negative  Negative Final   • MDMA (ECSTASY) INTERNAL CONTROL 03/14/2022 Passed  Passed Final   • Methamphetamine, Ur 03/14/2022 Negative  Negative Final   • METHAMPHETAMINE INTERNAL CONTROL 03/14/2022 Passed  Passed Final   • Methadone Screen, Urine 03/14/2022 Negative  Negative Final   • METHADONE INTERNAL CONTROL 03/14/2022 Passed  Passed Final   • Opiate Screen 03/14/2022 Negative  Negative Final   • OPIATES INTERNAL CONTROL 03/14/2022 Passed  Passed Final   • Oxycodone Screen, Urine 03/14/2022 Negative  Negative Final   • OXYCODONE INTERNAL CONTROL 03/14/2022 Passed  Passed Final   • Phencyclidine (PCP), Urine 03/14/2022 Negative  Negative Final   • PHENCYCLIDINE INTERNAL CONTROL 03/14/2022 Passed  Passed Final   • THC, Screen, Urine 03/14/2022 Negative  Negative Final   • THC INTERNAL CONTROL 03/14/2022 Passed  Passed Final   • Lot Number 03/14/2022 [id number]   Final   • Expiration Date 03/14/2022 3/31/23   Final       EKG Results:  No orders to display       Imaging Results:  No Images in the past 120 days found..      Assessment/Plan   Diagnoses and all orders for this visit:    1. Generalized anxiety disorder (Primary)  -     busPIRone (BUSPAR) 10 MG tablet; Take 1 tablet by mouth Daily. At 3 pm  Dispense: 30 tablet; Refill: 2  -     venlafaxine (EFFEXOR) 37.5 MG tablet;  Take 3 tablets by mouth Daily.  Dispense: 90 tablet; Refill: 2    2. Major depressive disorder, recurrent episode, moderate (HCC)  -     venlafaxine (EFFEXOR) 37.5 MG tablet; Take 3 tablets by mouth Daily.  Dispense: 90 tablet; Refill: 2    3. Hallucinations  -     QUEtiapine (SEROquel) 25 MG tablet; Take 2 tablets by mouth Every Night.  Dispense: 60 tablet; Refill: 2    4. Dementia without behavioral disturbance, unspecified dementia type (HCC)      Presentation most consistent with developing dementia, likely Lewy body dementia.  Patient also reports worsening gait abnormalities; she fell in the summer of last year, injuring her head, and requiring stitches.  Patient also has a history of depression, possible PTSD by review of chart.  Patient did not mention PTSD during the interview, but she is not a very good historian.  Often times she will say no to a question, and then contradict herself only a few moments later.  She has some trouble with knowing her medications or her medical conditions.    4/19: Patient is well and right where she should be.  7 minutes of supportive psychotherapy with goal to strengthen defenses, promote problems solving, restore adaptive functioning and provide symptom relief. The therapeutic alliance was strengthened to encourage the patient to express their thoughts and feelings. Esteem building was enhanced through praise, reassurance, normalizing and encouragement. Coping skills were enhanced to build distress tolerance skills and emotional regulation. Patient given education on medication side effects, diagnosis/illness and relapse symptoms. Plan to continue supportive psychotherapy in next appointment to provide symptom relief.  3 months    3/8: Target the feeling of anxiety at 4 PM by starting BuSpar at 3 PM.  Consider switching to gabapentin if BuSpar does not help.  Would start at a low dose of 100 mg.  16 minutes of supportive psychotherapy with goal to strengthen defenses,  promote problems solving, restore adaptive functioning and provide symptom relief. The therapeutic alliance was strengthened to encourage the patient to express their thoughts and feelings. Esteem building was enhanced through praise, reassurance, normalizing and encouragement. Coping skills were enhanced to build distress tolerance skills and emotional regulation. Patient given education on medication side effects, diagnosis/illness and relapse symptoms. Plan to continue supportive psychotherapy in next appointment to provide symptom relief.  5 weeks    11/8: Depressed in the mornings. Light box, increase venlafaxine. 17 minutes of supportive psychotherapy with goal to strengthen defenses, promote problems solving, restore adaptive functioning and provide symptom relief. The therapeutic alliance was strengthened to encourage the patient to express their thoughts and feelings. Esteem building was enhanced through praise, reassurance, normalizing and encouragement. Coping skills were enhanced to build distress tolerance skills and emotional regulation. Patient given education on medication side effects, diagnosis/illness and relapse symptoms. Plan to continue supportive psychotherapy in next appointment to provide symptom relief. 8 wks      9/7: Doing even better.  8 weeks.  No changes.    7/27: Markedly improved, and far more linear.  Still has residual anxiety and depression.  Hallucinations are still present but they do not cause distress at all to the patient.  Continue Seroquel at the present dose.  Double the dose of venlafaxine. See back in 6 weeks.  This was also discussed with Meagan, patient's daughter.    Previous:  Caution again advised in terms of increasing the dose given the likely sensitivity she has to antipsychotics.  Psychotherapy is deferred at this time.  S/p referral to neurologist is appropriate for further management.     Visit Diagnoses:    ICD-10-CM ICD-9-CM   1. Generalized anxiety disorder   F41.1 300.02   2. Major depressive disorder, recurrent episode, moderate (HCC)  F33.1 296.32   3. Hallucinations  R44.3 780.1   4. Dementia without behavioral disturbance, unspecified dementia type (HCC)  F03.90 294.20       PLAN:  28. Risk Assessment: Risk of self-harm acutely is low. Risk factors include anxiety disorder, depressive disorder, access to weapons, recent psychosocial stressors (pandemic). Protective factors include no family history, no present SI, no history of suicide attempts or self-harm in the past, no access to weapons, no AODA, healthcare seeking, future orientation, willingness to engage in care. Risk of self-harm chronically is also low, but could be further elevated in the event of treatment noncompliance and/or AODA.  29. Safety: No acute safety concerns.  30. Medications:   a. CONTINUE quetiapine 50 mg nightly. Risks, benefits, alternatives discussed with patient including nausea and vomiting, GI upset, sedation, akathisia, hypotension, increased appetite, lowering of seizure threshold, theoretical risk of tardive dyskinesia, extrapyramidal symptoms, restless legs syndrome. After discussion of these risks and benefits, the patient voiced understanding and agreed to proceed.  b. STATUS POST   i. sertraline 100 mg daily.  Not effective.  c. CONTINUE venlafaxine 112.5 mg PO QDAY. Risks, benefits, alternatives discussed with patient including GI upset, nausea vomiting diarrhea, theoretical decrease of seizure threshold predisposing the patient to a slightly higher seizure risk, headaches, sexual dysfunction, serotonin syndrome, bleeding risk, increased suicidality in patients 24 years and younger.  After discussion of these risks and benefits, the patient voiced understanding and agreed to proceed.  d. CONTINUE BuSpar 10 mg at 3 PM daily. Risks, benefits, alternatives discussed with patient including nausea, GI upset, mild sedation, falls risk.  After discussion of these risks and benefits,  the patient voiced understanding and agreed to proceed.  e. AGREE with gabapentin 300 twice daily.  31. Therapy: Deferred.  32. Other: s/p referral to neurology for workup of likely dementia.  33. Follow Up: 3 mos.      TREATMENT PLAN/GOALS: Continue supportive psychotherapy efforts and medications as indicated. Treatment and medication options discussed during today's visit. Patient ackowledged and verbally consented to continue with current treatment plan and was educated on the importance of compliance with treatment and follow-up appointments.    MEDICATION ISSUES:  ERNESTINA reviewed as expected.  Discussed medication options and treatment plan of prescribed medication as well as the risks, benefits, and side effects including potential falls, possible impaired driving and metabolic adversities among others. Patient is agreeable to call the office with any worsening of symptoms or onset of side effects. Patient is agreeable to call 911 or go to the nearest ER should he/she begin having SI/HI. No medication side effects or related complaints today.     MEDS ORDERED DURING VISIT:  New Medications Ordered This Visit   Medications   • busPIRone (BUSPAR) 10 MG tablet     Sig: Take 1 tablet by mouth Daily. At 3 pm     Dispense:  30 tablet     Refill:  2   • QUEtiapine (SEROquel) 25 MG tablet     Sig: Take 2 tablets by mouth Every Night.     Dispense:  60 tablet     Refill:  2   • venlafaxine (EFFEXOR) 37.5 MG tablet     Sig: Take 3 tablets by mouth Daily.     Dispense:  90 tablet     Refill:  2       Return in about 3 months (around 7/19/2022).         This document has been electronically signed by Riki Peterson MD  April 19, 2022 11:33 EDT      Part of this note may be an electronic transcription/translation of spoken language to printed text using the Dragon Dictation System.

## 2022-05-16 DIAGNOSIS — M79.2 NEUROPATHIC PAIN: ICD-10-CM

## 2022-05-16 RX ORDER — GABAPENTIN 300 MG/1
300 CAPSULE ORAL 2 TIMES DAILY
Qty: 60 CAPSULE | Refills: 0 | Status: SHIPPED | OUTPATIENT
Start: 2022-05-16 | End: 2022-06-17 | Stop reason: SDUPTHER

## 2022-05-16 NOTE — TELEPHONE ENCOUNTER
Patient is requesting a refill. LOV was 03/14/22, LRF was 04/18/22, UDS completed on 03/14/22 lizbeth none on file that I can see and contract last signed on 03/15/22.

## 2022-05-16 NOTE — TELEPHONE ENCOUNTER
Caller: NILDA AGUIRRE    Relationship: DAUGHTER     Best call back number: [phone]    Requested Prescriptions:   Requested Prescriptions     Pending Prescriptions Disp Refills   • gabapentin (NEURONTIN) 300 MG capsule 60 capsule 0     Sig: Take 1 capsule by mouth 2 (Two) Times a Day.        Pharmacy where request should be sent: [facility]/PHARMACY #[id number] - [place], [place] - [address] [facility] - [phone]  - [phone] FX     Additional details provided by patient: OUT OF MEDICATION     Does the patient have less than a 3 day supply:  [x] Yes  [] No    Alice Espinoza Rep   05/16/22 09:24 EDT

## 2022-06-17 ENCOUNTER — TELEPHONE (OUTPATIENT)
Dept: PSYCHIATRY | Facility: CLINIC | Age: 72
End: 2022-06-17

## 2022-06-17 DIAGNOSIS — M79.2 NEUROPATHIC PAIN: ICD-10-CM

## 2022-06-17 RX ORDER — GABAPENTIN 300 MG/1
300 CAPSULE ORAL 2 TIMES DAILY
Qty: 60 CAPSULE | Refills: 0 | Status: SHIPPED | OUTPATIENT
Start: 2022-06-17 | End: 2022-07-19 | Stop reason: SDUPTHER

## 2022-06-17 NOTE — TELEPHONE ENCOUNTER
Pt's daughter is going out of town and needs to get this med picked up for pt today. They are asking if you will refill today for her to  tonight

## 2022-06-17 NOTE — TELEPHONE ENCOUNTER
Pt daughter left voicemail requesting psychiatric provider take over pt's Gabapentin prescription. Pt daughter stated that they have been experiencing difficulties getting this medication filled. She also explained that pt is in need of a refill and needs to be done soon due to her going out of town and pt can't drive. Please review and send in prescription if possible.

## 2022-06-17 NOTE — TELEPHONE ENCOUNTER
LOV: 03/14/2022  Last Fill: 05/16/2022  UDS: 03/30/2021   Consent: 03/15/2022     Please advise refills

## 2022-06-17 NOTE — TELEPHONE ENCOUNTER
Caller: NILDA AGUIRRE    Relationship: Child    Best call back number: [phone]    Requested Prescriptions:   Requested Prescriptions     Pending Prescriptions Disp Refills   • gabapentin (NEURONTIN) 300 MG capsule 60 capsule 0     Sig: Take 1 capsule by mouth 2 (Two) Times a Day.        Pharmacy where request should be sent: [facility]/PHARMACY #[id number] - [place], [place] - [address] [facility] - [phone]  - [phone] FX     Additional details provided by patient: PATIENT IS CURRENTLY OUT OF THIS MEDICATION. SHE IS NEEDING THIS SENT TODAY AS DAUGHTER IS THE PERSON WHO PICKS UP SCRIPTS FOR PATIENT AND SHE IS LEAVING [facility], 06/17/2022.    Does the patient have less than a 3 day supply:  [x] Yes  [] No    Alice Arana   06/17/22 14:09 EDT

## 2022-06-17 NOTE — TELEPHONE ENCOUNTER
Caller: CARLA NILDA    Relationship: Child    Best call back number: [phone]    Requested Prescriptions:   Requested Prescriptions     Pending Prescriptions Disp Refills   • gabapentin (NEURONTIN) 300 MG capsule 60 capsule 0     Sig: Take 1 capsule by mouth 2 (Two) Times a Day.        Pharmacy where request should be sent: [facility]/PHARMACY #[id number] - [place], [place] - [address] [facility] [phone]  - [phone] FX         Does the patient have less than a 3 day supply:  [x] Yes  [] No    Alice Morgan Rep   06/17/22 09:07 EDT

## 2022-07-09 DIAGNOSIS — F41.1 GENERALIZED ANXIETY DISORDER: ICD-10-CM

## 2022-07-09 DIAGNOSIS — R44.3 HALLUCINATIONS: ICD-10-CM

## 2022-07-09 DIAGNOSIS — F33.1 MAJOR DEPRESSIVE DISORDER, RECURRENT EPISODE, MODERATE: ICD-10-CM

## 2022-07-11 RX ORDER — QUETIAPINE FUMARATE 25 MG/1
50 TABLET, FILM COATED ORAL NIGHTLY
Qty: 60 TABLET | Refills: 2 | Status: SHIPPED | OUTPATIENT
Start: 2022-07-11 | End: 2022-08-31 | Stop reason: SDUPTHER

## 2022-07-11 RX ORDER — VENLAFAXINE 37.5 MG/1
112.5 TABLET ORAL DAILY
Qty: 90 TABLET | Refills: 2 | Status: SHIPPED | OUTPATIENT
Start: 2022-07-11 | End: 2022-07-19 | Stop reason: SDUPTHER

## 2022-07-19 ENCOUNTER — TELEMEDICINE (OUTPATIENT)
Dept: PSYCHIATRY | Facility: CLINIC | Age: 72
End: 2022-07-19

## 2022-07-19 ENCOUNTER — TELEPHONE (OUTPATIENT)
Dept: PSYCHIATRY | Facility: CLINIC | Age: 72
End: 2022-07-19

## 2022-07-19 DIAGNOSIS — F03.90 DEMENTIA WITHOUT BEHAVIORAL DISTURBANCE, UNSPECIFIED DEMENTIA TYPE: ICD-10-CM

## 2022-07-19 DIAGNOSIS — R44.3 HALLUCINATIONS: ICD-10-CM

## 2022-07-19 DIAGNOSIS — F41.1 GENERALIZED ANXIETY DISORDER: Primary | ICD-10-CM

## 2022-07-19 DIAGNOSIS — M79.2 NEUROPATHIC PAIN: ICD-10-CM

## 2022-07-19 DIAGNOSIS — F33.1 MAJOR DEPRESSIVE DISORDER, RECURRENT EPISODE, MODERATE: ICD-10-CM

## 2022-07-19 DIAGNOSIS — R68.89 FORGETFULNESS: ICD-10-CM

## 2022-07-19 PROCEDURE — 99214 OFFICE O/P EST MOD 30 MIN: CPT | Performed by: STUDENT IN AN ORGANIZED HEALTH CARE EDUCATION/TRAINING PROGRAM

## 2022-07-19 PROCEDURE — 90833 PSYTX W PT W E/M 30 MIN: CPT | Performed by: STUDENT IN AN ORGANIZED HEALTH CARE EDUCATION/TRAINING PROGRAM

## 2022-07-19 RX ORDER — VENLAFAXINE HYDROCHLORIDE 150 MG/1
150 CAPSULE, EXTENDED RELEASE ORAL DAILY
Qty: 30 CAPSULE | Refills: 2 | Status: SHIPPED | OUTPATIENT
Start: 2022-07-19 | End: 2022-08-31 | Stop reason: SDUPTHER

## 2022-07-19 RX ORDER — BUSPIRONE HYDROCHLORIDE 10 MG/1
10 TABLET ORAL DAILY
Qty: 30 TABLET | Refills: 2 | Status: SHIPPED | OUTPATIENT
Start: 2022-07-19 | End: 2022-08-31 | Stop reason: SDUPTHER

## 2022-07-19 RX ORDER — GABAPENTIN 300 MG/1
300 CAPSULE ORAL 2 TIMES DAILY
Qty: 60 CAPSULE | Refills: 2 | Status: SHIPPED | OUTPATIENT
Start: 2022-07-19 | End: 2022-11-11

## 2022-07-19 NOTE — PROGRESS NOTES
"Subjective   Payal Singer is a 72 y.o. female who presents today for follow up    Chief Complaint:  mdd meagan    History of Present Illness:     Chart review 4/15: Seen 3/30 to establish care. Hx of anx/dep avh in [place]. on both sertraline and venlafaxine, was on Seroquel. Hx of HTN, GERD. On gabapentin 600 tid, sertraline 200 d, venla 150 d, quetiap 25 qhs. March 2021 labs: cbc shows low mch, high rdw; Cr 1.15 high, abnormal lipids, TSH and fT4 wnl. No OP head imaging, ekg.     \"Meagan and Payal\"    7/19: Virtual visit via Zoom audio and video due to the COVID-19 pandemic.  Patient is accepting of and agreeable to visit.  The visit consisted of the patient and I. The patient is at home, and I am at the office.  Interview:  1. Chart review: No new.  2. Planning: No changes made at last visit 3 months ago, patient was where she needed to be.  3. \"Doing well.\"  a. Dog is sick, so \"I'm a little shaky.\"  b. Fearful that whatever the dog has is contagious.  c. Sleeping well.  d. Buspar not working as well like it did.  4. Mood/Depression: 6/10, still has depressed mood, guilt, poor energy and concentration.  5. Anxiety: 8/10, due to situation (5 teenagers),  is 78 yo, dizzy all the time  a. Worrying, on edge, \"shaky,\" restless, muscle tension  6. Panic attacks: no  7. Sleeping: well  8. Eating: stable  9. Refills: y   10. Substances: n  11. Therapy: n  12. Medication compliant: y  13. No SI HI AVH.      4/19: Virtual visit via Zoom audio and video due to the COVID-19 pandemic.  Patient is accepting of and agreeable to visit.  The visit consisted of the patient and I. The patient is at home, and I am at the office.  Interview:  14. Chart review: Patient has now started gabapentin 300 mg twice daily.  This had previously been prescribed by dermatology for chronic scalp pain following biopsies.  She was seen by primary care March 14 and was doing well from a mental health standpoint.  CMP in March show slightly elevated " "creatinine 1.09, alk phos 125, otherwise reassuring CMP, thyroid studies, vitamin D, CBC.  Triglycerides are elevated.  15. \"Things are going good.\"  a. Gabapentin helps with anxiety. Morning and evening.  b. Buspar helps as well.  c. Sleeping well.   d. \"I believe I am where I need to be.\"  16. Refills: y  17. Therapy: n  18. Medication compliant: y  19. No SI HI AVH.      3/8: Virtual visit via Zoom audio and video due to the COVID-19 pandemic.  Patient is accepting of and agreeable to visit.  The visit consisted of the patient and I. The patient is at home, and I am at the office.  Interview:  20. Chart review: No new developments  21. \" I get depressed at 4:00 every day.\"  a. Lots of activity at this time.  Kids getting food, having to take care of the animals.  b. Meagan believes patient gets overwhelmed during this time.  After she mention this, the patient agreed.  c. Has never tried BuSpar.  Has been on gabapentin in the past, is not presently on it.  Was on it for neuropathic pain.  d. Otherwise depression and anxiety are extremely well controlled.  Increasing Effexor was helpful for the patient's anxiety.  e. Generally sleeping well.  22. Medication compliant: Yes  23. No SI HI AVH.      11/8: Virtual visit via Zoom audio and video due to the COVID-19 pandemic.  Patient is accepting of and agreeable to visit.  The visit consisted of the patient and I.  Interview:  24. Chart review: Seen by primary care October 8.  Hypertension well controlled.  Labs demonstrate elevated CO2 of 29.4 on CMP, creatinine is 1.00 normal, thyroid studies are normal, elevated triglycerides, normal CBC.  Patient underwent an MRI for hallucinations and it shows no acute/nothing.  25. \"Pretty good.\"  26. Depression/Mood: \"depressed in the afternoon.\"  a. Lifts after she takes her medication.  b. Seasonal component  c. Misses [place] and its weather, and her family.  1. Anhedonia: denies  2. Guilt or hopelessness: denies  3. Energy: " "down until the afternoon  4. Concentration: sometimes  5. Weight loss or weight gain: lost 20 lbs over a year  6. Psychomotor retardation or agitation: possible retardation  7. Insomnia: denies  27. Anxiety:  1. Uncontrolled worrying: yes  2. Muscle tension: sometimes  3. Fatigue: down until the afternoon  4. Concentration: sometimes  5. Restlessness/feeling on edge: sometimes in the day, not at night  6. Irritability: sometimes  7. Insomnia: denies  28. Sleeping: yes  29. Eating: well  30. Substances: denies  31. Therapy: deferred  32. Medication compliant: some issues with compliance (on accident)  a. Taking 150 mg of venlafaxine for the last week, rather than 75 mg due to pharmacy error.  33. No SI HI AVH.  a. Sees bugs, but they actually have a bug issue in the apartment. (ladybugs)  b. Meagan does not believe she is hallucinating.      9/7: Virtual visit via Zoom audio and video due to the COVID-19 pandemic.  Patient is accepting of and agreeable to visit.  The visit consisted of the patient and I.  Interview:  34. Records review shows no new developments.  35. \"Everything's pretty good.\"  36. No hallucinations at all. Might see a shadow.  37. Depression better.  38.  doing better, but has dizziness.  39. Meagan in agreement.  40. No SI HI AVH.      7/27: Virtual visit via Zoom audio and video due to the COVID-19 pandemic. Patient is accepting of and agreeable to appointment. The appointment consisted of the patient and I only.   Interview: \"Good\"   1. Having VH less frequently, once or twice a week. Like someone passes me real fast. Not experiencing spiders.  2. Sleeping well.  3.  doing better. Worrying is much better.  4. Medicine has been helpful.  5. Still feels depressed. Homesick for [place].   6. No panic attacks in last two weeks.  7. Much clearer today, linear. Better historian.  8. Meagan agrees.  9. No SI HI AVH.    Previous:  Still having panic attacks once a week. Sx: soa, sweating, " "palpitations, tachycardia, fear, no derealization/depersonalization, last 30 min, leaves room where she was at. No triggers. Fear of another panic attack happening.     Anxiety due to 's health; admitted to hospital recently for dehydration. Notes uncontrolled worrying, muscle tension, irritability, restlessness, trouble sleeping - will wake at 3 am and can't go back to sleep. Duration is at least 6 months, since moved here.    Still having hallucinations every night before she goes to bed. Sees shadows walk past bed. No AH. Sometimes sees spiders during the day on the floor; happens once or twice a week.     Seroquel helped to some extent.     Again, patient is somewhat of a poor historian. Often times she will answer a question with \"no,\" and then contradict her self moments later.    Collateral from Meagan: patient did have a UTI (UA confirmed); was put on a course of abx. Feels her mom's confusion has improved since then.      Past Surgical History:  Past Surgical History:   Procedure Laterality Date   • CATARACT EXTRACTION  2002   • CHOLECYSTECTOMY OPEN  1971       Problem List:  Patient Active Problem List   Diagnosis   • Forgetfulness   • Anxiety   • Depression   • Esophageal reflux   • Hallucinations   • Head injury   • Hypertension   • Other acute sinusitis   • PTSD (post-traumatic stress disorder)   • Mixed hyperlipidemia   • Acquired hypothyroidism   • Vitamin D deficiency   • Neuropathic pain       Allergy:   Allergies   Allergen Reactions   • Floxin Otic [Ofloxacin] Rash        Discontinued Medications:  Medications Discontinued During This Encounter   Medication Reason   • clindamycin (CLEOCIN T) 1 % external solution *Therapy completed   • gabapentin (NEURONTIN) 300 MG capsule Reorder   • venlafaxine (EFFEXOR) 37.5 MG tablet Duplicate order   • busPIRone (BUSPAR) 10 MG tablet Reorder       Current Medications:   Current Outpatient Medications   Medication Sig Dispense Refill   • aspirin 81 MG " EC tablet aspirin 81 mg oral tablet,delayed release (DR/EC) take 1 tablet (81 mg) by oral route once daily   Active     • atorvastatin (LIPITOR) 40 MG tablet Take 1 tablet by mouth Daily. 90 tablet 1   • busPIRone (BUSPAR) 10 MG tablet Take 1 tablet by mouth Daily. At 3 pm 30 tablet 2   • ergocalciferol (ERGOCALCIFEROL) 1.25 MG (82006 UT) capsule Take 1 capsule by mouth 1 (One) Time Per Week. 13 capsule 1   • gabapentin (NEURONTIN) 300 MG capsule Take 1 capsule by mouth 2 (Two) Times a Day. 60 capsule 2   • levothyroxine (SYNTHROID, LEVOTHROID) 75 MCG tablet Take 1 tablet by mouth Daily. 90 tablet 1   • lisinopril (PRINIVIL,ZESTRIL) 10 MG tablet Take 1 tablet by mouth Daily. 90 tablet 1   • metoprolol tartrate (LOPRESSOR) 25 MG tablet metoprolol tartrate 25 mg oral tablet take 2 tablets (50 mg) by oral route once daily   Active     • multivitamin with minerals tablet tablet Take 1 tablet by mouth Daily.     • omeprazole (priLOSEC) 40 MG capsule Take 1 capsule by mouth Daily. 90 capsule 1   • QUEtiapine (SEROquel) 25 MG tablet TAKE 2 TABLETS BY MOUTH EVERY NIGHT 60 tablet 2   • Zinc Sulfate (ZINC 15 PO) Take  by mouth.     • Ascorbic Acid (Vitamin C) 250 MG chewable tablet Chew.     • venlafaxine XR (EFFEXOR-XR) 150 MG 24 hr capsule Take 1 capsule by mouth Daily. 30 capsule 2     No current facility-administered medications for this visit.       Past Medical History:  Past Medical History:   Diagnosis Date   • Acid reflux    • Allergies    • Anxiety    • Depression    • Forgetfulness    • Hallucinations 04/15/2021   • Head injury    • HTN (hypertension)    • PTSD (post-traumatic stress disorder)          Social History     Socioeconomic History   • Marital status: Unknown   Tobacco Use   • Smoking status: Never Smoker   • Smokeless tobacco: Never Used   Vaping Use   • Vaping Use: Never used   Substance and Sexual Activity   • Alcohol use: Never   • Drug use: Never   • Sexual activity: Not Currently         Family  "History   Problem Relation Age of Onset   • Stroke Mother    • Heart disease Mother    • Stroke Father    • Heart disease Father    • Diabetes Father    • Stroke Sister    • Diabetes Sister    • Stroke Brother    • Diabetes Brother    • Diabetes Other         AUNT/UNCLE   • Anxiety disorder Other        Mental Status Exam:   Hygiene:   good, at home  Cooperation:  Cooperative  Eye Contact:  Good  Psychomotor Behavior:  Appropriate  Affect:  Appropriate, mood congruent  Mood: \"still anxious. Still depressed.\"  Speech:  Normal  Thought Process:  Goal directed  Thought Content:  Normal and Mood congruent  Suicidal:  None  Homicidal:  None  Hallucinations:  None  Delusion:  None  Memory:  Deficits  Orientation:  Grossly intact  Reliability:  fair  Insight:  Fair  Judgement:  Fair  Impulse Control:  Fair  Physical/Medical Issues:  No      Review of Systems:  Review of Systems   Constitutional: Positive for fatigue. Negative for diaphoresis.   HENT: Negative for drooling.    Eyes: Negative for visual disturbance.   Respiratory: Negative for cough and shortness of breath.    Cardiovascular: Negative for chest pain, palpitations and leg swelling.   Gastrointestinal: Negative for nausea and vomiting.   Endocrine: Positive for heat intolerance. Negative for cold intolerance.   Musculoskeletal: Negative for joint swelling.   Allergic/Immunologic: Negative for immunocompromised state.   Neurological: Negative for dizziness and seizures.   Hematological: Negative for adenopathy.   Psychiatric/Behavioral: Negative for sleep disturbance.         Physical Exam:  Physical Exam    Vital Signs:   There were no vitals taken for this visit.     Lab Results:   Office Visit on 03/14/2022   Component Date Value Ref Range Status   • Glucose 03/14/2022 94  65 - 99 mg/dL Final   • BUN 03/14/2022 15  8 - 23 mg/dL Final   • Creatinine 03/14/2022 1.09 (A) 0.57 - 1.00 mg/dL Final   • Sodium 03/14/2022 142  136 - 145 mmol/L Final   • Potassium " 03/14/2022 3.8  3.5 - 5.2 mmol/L Final   • Chloride 03/14/2022 102  98 - 107 mmol/L Final   • CO2 03/14/2022 28.0  22.0 - 29.0 mmol/L Final   • Calcium 03/14/2022 9.7  8.6 - 10.5 mg/dL Final   • Total Protein 03/14/2022 7.6  6.0 - 8.5 g/dL Final   • Albumin 03/14/2022 4.10  3.50 - 5.20 g/dL Final   • ALT (SGPT) 03/14/2022 13  1 - 33 U/L Final   • AST (SGOT) 03/14/2022 15  1 - 32 U/L Final   • Alkaline Phosphatase 03/14/2022 125 (A) 39 - 117 U/L Final   • Total Bilirubin 03/14/2022 0.2  0.0 - 1.2 mg/dL Final   • Globulin 03/14/2022 3.5  gm/dL Final   • A/G Ratio 03/14/2022 1.2  g/dL Final   • BUN/Creatinine Ratio 03/14/2022 13.8  7.0 - 25.0 Final   • Anion Gap 03/14/2022 12.0  5.0 - 15.0 mmol/L Final   • eGFR 03/14/2022 53.8 (A) >60.0 mL/min/1.73 Final    National Kidney Foundation and American Society of Nephrology (ASN) Task Force recommended calculation based on the Chronic Kidney Disease Epidemiology Collaboration (CKD-EPI) equation refit without adjustment for race.   • TSH 03/14/2022 1.040  0.270 - 4.200 uIU/mL Final   • Total Cholesterol 03/14/2022 169  0 - 200 mg/dL Final   • Triglycerides 03/14/2022 197 (A) 0 - 150 mg/dL Final   • HDL Cholesterol 03/14/2022 46  40 - 60 mg/dL Final   • LDL Cholesterol  03/14/2022 90  0 - 100 mg/dL Final   • VLDL Cholesterol 03/14/2022 33  5 - 40 mg/dL Final   • LDL/HDL Ratio 03/14/2022 1.82   Final   • 25 Hydroxy, Vitamin D 03/14/2022 44.1  30.0 - 100.0 ng/ml Final   • WBC 03/14/2022 8.03  3.40 - 10.80 10*3/mm3 Final   • RBC 03/14/2022 4.67  3.77 - 5.28 10*6/mm3 Final   • Hemoglobin 03/14/2022 12.6  12.0 - 15.9 g/dL Final   • Hematocrit 03/14/2022 38.7  34.0 - 46.6 % Final   • MCV 03/14/2022 82.9  79.0 - 97.0 fL Final   • MCH 03/14/2022 27.0  26.6 - 33.0 pg Final   • MCHC 03/14/2022 32.6  31.5 - 35.7 g/dL Final   • RDW 03/14/2022 13.2  12.3 - 15.4 % Final   • RDW-SD 03/14/2022 39.7  37.0 - 54.0 fl Final   • MPV 03/14/2022 11.2  6.0 - 12.0 fL Final   • Platelets 03/14/2022 305   140 - 450 10*3/mm3 Final   • Neutrophil % 03/14/2022 79.9 (A) 42.7 - 76.0 % Final   • Lymphocyte % 03/14/2022 11.2 (A) 19.6 - 45.3 % Final   • Monocyte % 03/14/2022 6.6  5.0 - 12.0 % Final   • Eosinophil % 03/14/2022 1.4  0.3 - 6.2 % Final   • Basophil % 03/14/2022 0.5  0.0 - 1.5 % Final   • Immature Grans % 03/14/2022 0.4  0.0 - 0.5 % Final   • Neutrophils, Absolute 03/14/2022 6.42  1.70 - 7.00 10*3/mm3 Final   • Lymphocytes, Absolute 03/14/2022 0.90  0.70 - 3.10 10*3/mm3 Final   • Monocytes, Absolute 03/14/2022 0.53  0.10 - 0.90 10*3/mm3 Final   • Eosinophils, Absolute 03/14/2022 0.11  0.00 - 0.40 10*3/mm3 Final   • Basophils, Absolute 03/14/2022 0.04  0.00 - 0.20 10*3/mm3 Final   • Immature Grans, Absolute 03/14/2022 0.03  0.00 - 0.05 10*3/mm3 Final   • nRBC 03/14/2022 0.0  0.0 - 0.2 /100 WBC Final   • Amphetamine Screen, Urine 03/14/2022 Negative  Negative Final   • AMP INTERNAL CONTROL 03/14/2022 Passed  Passed Final   • Barbiturates Screen, Urine 03/14/2022 Negative  Negative Final   • BARBITURATE INTERNAL CONTROL 03/14/2022 Passed  Passed Final   • Buprenorphine, Screen, Urine 03/14/2022 Negative  Negative Final   • BUPRENORPHINE INTERNAL CONTROL 03/14/2022 Passed  Passed Final   • Benzodiazepine Screen, Urine 03/14/2022 Negative  Negative Final   • BENZODIAZEPINE INTERNAL CONTROL 03/14/2022 Passed  Passed Final   • Cocaine Screen, Urine 03/14/2022 Negative  Negative Final   • COCAINE INTERNAL CONTROL 03/14/2022 Passed  Passed Final   • MDMA (ECSTASY) 03/14/2022 Negative  Negative Final   • MDMA (ECSTASY) INTERNAL CONTROL 03/14/2022 Passed  Passed Final   • Methamphetamine, Ur 03/14/2022 Negative  Negative Final   • METHAMPHETAMINE INTERNAL CONTROL 03/14/2022 Passed  Passed Final   • Methadone Screen, Urine 03/14/2022 Negative  Negative Final   • METHADONE INTERNAL CONTROL 03/14/2022 Passed  Passed Final   • Opiate Screen 03/14/2022 Negative  Negative Final   • OPIATES INTERNAL CONTROL 03/14/2022 Passed   Passed Final   • Oxycodone Screen, Urine 03/14/2022 Negative  Negative Final   • OXYCODONE INTERNAL CONTROL 03/14/2022 Passed  Passed Final   • Phencyclidine (PCP), Urine 03/14/2022 Negative  Negative Final   • PHENCYCLIDINE INTERNAL CONTROL 03/14/2022 Passed  Passed Final   • THC, Screen, Urine 03/14/2022 Negative  Negative Final   • THC INTERNAL CONTROL 03/14/2022 Passed  Passed Final   • Lot Number 03/14/2022 [id number]   Final   • Expiration Date 03/14/2022 3/31/23   Final       EKG Results:  No orders to display       Imaging Results:  No Images in the past 120 days found..      Assessment & Plan   Diagnoses and all orders for this visit:    1. Generalized anxiety disorder (Primary)  -     venlafaxine XR (EFFEXOR-XR) 150 MG 24 hr capsule; Take 1 capsule by mouth Daily.  Dispense: 30 capsule; Refill: 2  -     busPIRone (BUSPAR) 10 MG tablet; Take 1 tablet by mouth Daily. At 3 pm  Dispense: 30 tablet; Refill: 2    2. Major depressive disorder, recurrent episode, moderate (HCC)  -     venlafaxine XR (EFFEXOR-XR) 150 MG 24 hr capsule; Take 1 capsule by mouth Daily.  Dispense: 30 capsule; Refill: 2    3. Hallucinations    4. Dementia without behavioral disturbance, unspecified dementia type (HCC)    5. Forgetfulness    6. Neuropathic pain  Comments:  of scalp, chronic. will restart gabapentin--UDS, controlled substance agreement, and Mehdi reviewed today.  Orders:  -     gabapentin (NEURONTIN) 300 MG capsule; Take 1 capsule by mouth 2 (Two) Times a Day.  Dispense: 60 capsule; Refill: 2      Presentation most consistent with developing dementia, likely Lewy body dementia.  Patient also reports worsening gait abnormalities; she fell in the summer of last year, injuring her head, and requiring stitches.  Patient also has a history of depression, possible PTSD by review of chart.  Patient did not mention PTSD during the interview, but she is not a very good historian.  Often times she will say no to a question, and then  contradict herself only a few moments later.  She has some trouble with knowing her medications or her medical conditions.    7/19: Increase effexor to target residual anx and dep.  Consider increasing BuSpar soon at next appointment.  17 minutes of supportive psychotherapy with goal to strengthen defenses, promote problems solving, restore adaptive functioning and provide symptom relief. The therapeutic alliance was strengthened to encourage the patient to express their thoughts and feelings. Esteem building was enhanced through praise, reassurance, normalizing and encouragement. Coping skills were enhanced to build distress tolerance skills and emotional regulation. Allowed patient to freely discuss issues without interruption or judgement with unconditional positive regard, active listening skills, and empathy. Provided a safe, confidential environment to facilitate the development of a positive therapeutic relationship and encourage open, honest communication. Assisted patient in identifying risk factors which would indicate the need for higher level of care including thoughts to harm self or others and/or self-harming behavior and encouraged patient to contact this office, call 911, or present to the nearest emergency room should any of these events occur. Assisted patient in processing session content; acknowledged and normalized patient’s thoughts, feelings, and concerns by utilizing a person-centered approach in efforts to build appropriate rapport and a positive therapeutic relationship with open and honest communication. Patient given education on medication side effects, diagnosis/illness and relapse symptoms. Plan to continue supportive psychotherapy in next appointment to provide symptom relief.  Diagnoses: as above  Symptoms: as above  Functional status: Good  Mental Status Exam: as above    Treatment plan: Medication management and supportive psychotherapy  Prognosis: Good  Progress: Worsened anxiety  and depression  6 weeks    4/19: Patient is well and right where she should be.  7 minutes of supportive psychotherapy with goal to strengthen defenses, promote problems solving, restore adaptive functioning and provide symptom relief. The therapeutic alliance was strengthened to encourage the patient to express their thoughts and feelings. Esteem building was enhanced through praise, reassurance, normalizing and encouragement. Coping skills were enhanced to build distress tolerance skills and emotional regulation. Patient given education on medication side effects, diagnosis/illness and relapse symptoms. Plan to continue supportive psychotherapy in next appointment to provide symptom relief.  3 months    3/8: Target the feeling of anxiety at 4 PM by starting BuSpar at 3 PM.  Consider switching to gabapentin if BuSpar does not help.  Would start at a low dose of 100 mg.  16 minutes of supportive psychotherapy with goal to strengthen defenses, promote problems solving, restore adaptive functioning and provide symptom relief. The therapeutic alliance was strengthened to encourage the patient to express their thoughts and feelings. Esteem building was enhanced through praise, reassurance, normalizing and encouragement. Coping skills were enhanced to build distress tolerance skills and emotional regulation. Patient given education on medication side effects, diagnosis/illness and relapse symptoms. Plan to continue supportive psychotherapy in next appointment to provide symptom relief.  5 weeks    11/8: Depressed in the mornings. Light box, increase venlafaxine. 17 minutes of supportive psychotherapy with goal to strengthen defenses, promote problems solving, restore adaptive functioning and provide symptom relief. The therapeutic alliance was strengthened to encourage the patient to express their thoughts and feelings. Esteem building was enhanced through praise, reassurance, normalizing and encouragement. Coping skills  were enhanced to build distress tolerance skills and emotional regulation. Patient given education on medication side effects, diagnosis/illness and relapse symptoms. Plan to continue supportive psychotherapy in next appointment to provide symptom relief. 8 wks      9/7: Doing even better.  8 weeks.  No changes.    7/27: Markedly improved, and far more linear.  Still has residual anxiety and depression.  Hallucinations are still present but they do not cause distress at all to the patient.  Continue Seroquel at the present dose.  Double the dose of venlafaxine. See back in 6 weeks.  This was also discussed with Meagan, patient's daughter.    Previous:  Caution again advised in terms of increasing the dose given the likely sensitivity she has to antipsychotics.  Psychotherapy is deferred at this time.  S/p referral to neurologist is appropriate for further management.     Visit Diagnoses:    ICD-10-CM ICD-9-CM   1. Generalized anxiety disorder  F41.1 300.02   2. Major depressive disorder, recurrent episode, moderate (HCC)  F33.1 296.32   3. Hallucinations  R44.3 780.1   4. Dementia without behavioral disturbance, unspecified dementia type (HCC)  F03.90 294.20   5. Forgetfulness  R68.89 780.99   6. Neuropathic pain  M79.2 729.2       PLAN:  41. Risk Assessment: Risk of self-harm acutely is low. Risk factors include anxiety disorder, depressive disorder, access to weapons, recent psychosocial stressors (pandemic). Protective factors include no family history, no present SI, no history of suicide attempts or self-harm in the past, no access to weapons, no AODA, healthcare seeking, future orientation, willingness to engage in care. Risk of self-harm chronically is also low, but could be further elevated in the event of treatment noncompliance and/or AODA.  42. Safety: No acute safety concerns.  43. Medications:   a. CONTINUE quetiapine 50 mg nightly. Risks, benefits, alternatives discussed with patient including nausea  and vomiting, GI upset, sedation, akathisia, hypotension, increased appetite, lowering of seizure threshold, theoretical risk of tardive dyskinesia, extrapyramidal symptoms, restless legs syndrome. After discussion of these risks and benefits, the patient voiced understanding and agreed to proceed.  b. STATUS POST   i. sertraline 100 mg daily.  Not effective.  c. INCREASE venlafaxine 112.5 to 150 mg PO QDAY. Risks, benefits, alternatives discussed with patient including GI upset, nausea vomiting diarrhea, theoretical decrease of seizure threshold predisposing the patient to a slightly higher seizure risk, headaches, sexual dysfunction, serotonin syndrome, bleeding risk, increased suicidality in patients 24 years and younger.  After discussion of these risks and benefits, the patient voiced understanding and agreed to proceed.  d. CONTINUE BuSpar 10 mg at 3 PM daily. Risks, benefits, alternatives discussed with patient including nausea, GI upset, mild sedation, falls risk.  After discussion of these risks and benefits, the patient voiced understanding and agreed to proceed.  e. AGREE with gabapentin 300 twice daily.  44. Therapy: Deferred.  45. Other: s/p referral to neurology for workup of likely dementia.  46. Follow Up: 6 wks      TREATMENT PLAN/GOALS: Continue supportive psychotherapy efforts and medications as indicated. Treatment and medication options discussed during today's visit. Patient ackowledged and verbally consented to continue with current treatment plan and was educated on the importance of compliance with treatment and follow-up appointments.    MEDICATION ISSUES:  ERNESTINA reviewed as expected.  Discussed medication options and treatment plan of prescribed medication as well as the risks, benefits, and side effects including potential falls, possible impaired driving and metabolic adversities among others. Patient is agreeable to call the office with any worsening of symptoms or onset of side  effects. Patient is agreeable to call 911 or go to the nearest ER should he/she begin having SI/HI. No medication side effects or related complaints today.     MEDS ORDERED DURING VISIT:  New Medications Ordered This Visit   Medications   • gabapentin (NEURONTIN) 300 MG capsule     Sig: Take 1 capsule by mouth 2 (Two) Times a Day.     Dispense:  60 capsule     Refill:  2   • venlafaxine XR (EFFEXOR-XR) 150 MG 24 hr capsule     Sig: Take 1 capsule by mouth Daily.     Dispense:  30 capsule     Refill:  2     Replaces old script (37.1ppv2=011.5 mg daily). Thank you.   • busPIRone (BUSPAR) 10 MG tablet     Sig: Take 1 tablet by mouth Daily. At 3 pm     Dispense:  30 tablet     Refill:  2       Return in about 6 weeks (around 8/30/2022).         This document has been electronically signed by Riki Peterson MD  July 19, 2022 11:29 EDT      Part of this note may be an electronic transcription/translation of spoken language to printed text using the Dragon Dictation System.

## 2022-07-19 NOTE — TREATMENT PLAN
Multi-Disciplinary Problems (from Behavioral Health Treatment Plan)    Active Problems     Problem: Anxiety  Start Date: 07/19/22    Problem Details: The patient self-scales this problem as a 8 with 10 being the worst.        Goal Priority Start Date Expected End Date End Date    Patient will develop and implement behavioral and cognitive strategies to reduce anxiety and irrational fears. -- 07/19/22 -- --    Goal Details: Progress toward goal:  Not appropriate to rate progress toward goal since this is the initial treatment plan.        Goal Intervention Frequency Start Date End Date    Help patient explore past emotional issues in relation to present anxiety. Q Month 07/19/22 --    Intervention Details: Duration of treatment until until remission of symptoms.        Goal Intervention Frequency Start Date End Date    Help patient develop an awareness of their cognitive and physical responses to anxiety. Q Month 07/19/22 --    Intervention Details: Duration of treatment until until remission of symptoms.              Problem: Depression  Start Date: 07/19/22    Problem Details: The patient self-scales this problem as a 6 with 10 being the worst.        Goal Priority Start Date Expected End Date End Date    Patient will demonstrate the ability to initiate new constructive life skills outside of sessions on a consistent basis. -- 07/19/22 -- --    Goal Details: Progress toward goal:  Not appropriate to rate progress toward goal since this is the initial treatment plan.        Goal Intervention Frequency Start Date End Date    Assist patient in setting attainable activities of daily living goals. PRN 07/19/22 --    Goal Intervention Frequency Start Date End Date    Provide education about depression Q Month 07/19/22 --    Intervention Details: Duration of treatment until until remission of symptoms.        Goal Intervention Frequency Start Date End Date    Assist patient in developing healthy coping strategies. Q Month  07/19/22 --    Intervention Details: Duration of treatment until until remission of symptoms.                    Reviewed By     Riki Peterson MD 07/19/22 5712                 I have discussed and reviewed this treatment plan with the patient.

## 2022-07-19 NOTE — PATIENT INSTRUCTIONS
1.  Please return to clinic at your next scheduled visit.  Contact the clinic ([phone]) at least 24 hours prior in the event you need to cancel.  2.  Do no harm to yourself or others.    3.  Avoid alcohol and drugs.    4.  Take all medications as prescribed.  Please contact the clinic with any concerns. If you are in need of medication refills, please call the clinic at [phone].    5. Should you want to get in touch with your provider, Dr. Riki Peterson, please utilize [employer] or contact the office ([phone]), and staff will be able to page Dr. Peterson directly.  6.  In the event you have personal crisis, contact the following crisis numbers: Suicide Prevention Hotline 1-816.266.3539; [facility] Helpline 1-842-013-WEFD; [facility] Emergency Room [phone]; text HELLO to 705807; or 699.     SPECIFIC RECOMMENDATIONS:     1.      Medications discussed at this encounter:                   - increase effexor     2.      Psychotherapy recommendations:      3.     Return to clinic: 6 weeks

## 2022-08-31 ENCOUNTER — TELEMEDICINE (OUTPATIENT)
Dept: PSYCHIATRY | Facility: CLINIC | Age: 72
End: 2022-08-31

## 2022-08-31 DIAGNOSIS — R68.89 FORGETFULNESS: ICD-10-CM

## 2022-08-31 DIAGNOSIS — F41.1 GENERALIZED ANXIETY DISORDER: ICD-10-CM

## 2022-08-31 DIAGNOSIS — F33.1 MAJOR DEPRESSIVE DISORDER, RECURRENT EPISODE, MODERATE: Primary | ICD-10-CM

## 2022-08-31 DIAGNOSIS — R44.3 HALLUCINATIONS: ICD-10-CM

## 2022-08-31 DIAGNOSIS — M79.2 NEUROPATHIC PAIN: ICD-10-CM

## 2022-08-31 DIAGNOSIS — F03.90 DEMENTIA WITHOUT BEHAVIORAL DISTURBANCE, UNSPECIFIED DEMENTIA TYPE: ICD-10-CM

## 2022-08-31 PROCEDURE — 99214 OFFICE O/P EST MOD 30 MIN: CPT | Performed by: STUDENT IN AN ORGANIZED HEALTH CARE EDUCATION/TRAINING PROGRAM

## 2022-08-31 PROCEDURE — 90833 PSYTX W PT W E/M 30 MIN: CPT | Performed by: STUDENT IN AN ORGANIZED HEALTH CARE EDUCATION/TRAINING PROGRAM

## 2022-08-31 RX ORDER — BUSPIRONE HYDROCHLORIDE 10 MG/1
10 TABLET ORAL DAILY
Qty: 90 TABLET | Refills: 1 | Status: SHIPPED | OUTPATIENT
Start: 2022-10-17 | End: 2023-01-31

## 2022-08-31 RX ORDER — VENLAFAXINE HYDROCHLORIDE 150 MG/1
150 CAPSULE, EXTENDED RELEASE ORAL DAILY
Qty: 90 CAPSULE | Refills: 1 | Status: SHIPPED | OUTPATIENT
Start: 2022-10-17 | End: 2023-01-31 | Stop reason: SDUPTHER

## 2022-08-31 RX ORDER — ACETAMINOPHEN 500 MG
500 TABLET ORAL EVERY 6 HOURS PRN
COMMUNITY

## 2022-08-31 RX ORDER — QUETIAPINE FUMARATE 25 MG/1
50 TABLET, FILM COATED ORAL NIGHTLY
Qty: 180 TABLET | Refills: 1 | Status: SHIPPED | OUTPATIENT
Start: 2022-10-09 | End: 2022-11-30 | Stop reason: SDUPTHER

## 2022-08-31 NOTE — PATIENT INSTRUCTIONS
1.  Please return to clinic at your next scheduled visit.  Contact the clinic ([phone]) at least 24 hours prior in the event you need to cancel.  2.  Do no harm to yourself or others.    3.  Avoid alcohol and drugs.    4.  Take all medications as prescribed.  Please contact the clinic with any concerns. If you are in need of medication refills, please call the clinic at [phone].    5. Should you want to get in touch with your provider, Dr. Riki Peterson, please utilize [employer] or contact the office ([phone]), and staff will be able to page Dr. Peterson directly.  6.  In the event you have personal crisis, contact the following crisis numbers: Suicide Prevention Hotline 1-918.268.4950; [facility] Helpline 0-257-314-JMLF; [facility] Emergency Room [phone]; text HELLO to 222491; or 982.     SPECIFIC RECOMMENDATIONS:     1.      Medications discussed at this encounter:                   - no changes     2.      Psychotherapy recommendations:      3.     Return to clinic: 3 mos

## 2022-08-31 NOTE — PROGRESS NOTES
"Subjective   Payal Singer is a 72 y.o. female who presents today for follow up    Chief Complaint:  mdd meagan    History of Present Illness:     Chart review 4/15: Seen 3/30 to establish care. Hx of anx/dep avh in [place]. on both sertraline and venlafaxine, was on Seroquel. Hx of HTN, GERD. On gabapentin 600 tid, sertraline 200 d, venla 150 d, quetiap 25 qhs. March 2021 labs: cbc shows low mch, high rdw; Cr 1.15 high, abnormal lipids, TSH and fT4 wnl. No OP head imaging, ekg.     \"Meagan and Payal\"    8/31: Virtual visit via Zoom audio and video due to the COVID-19 pandemic.  Patient is accepting of and agreeable to visit.  The visit consisted of the patient and I. The patient is at home, and I am at the office.  Interview:  1. Chart review: No new.  2. Planning: Increased Effexor at last visit.  Targeting anxiety.  3. \"I'm good.\"  a. Better control of the anxiety.  i. Situational: both taking care of her   ii. Anxiety improved on the higher dose of effexor. 5/10.  iii. Agrees she is in a good place.  iv. Had crying spells when ran out of effexor  b. Per Meagan, most of it comes from her dad's depression and irritability. He gets verbal.  i. Trying to get him to see a psychiatrist  ii. Situational  iii. We discussed behavioral options  1. Worse at night  2. Triggers -- unexpected, can't anticipate  3. Dealing with vertigo  4. Family meeting -- agrees to trying that  5. Meagan tearful during visit  4. Mood/Depression: 5/10, a little better, but still some depressed mood  5. Panic attacks: n  6. Sleeping: well  7. Eating: stable  8. Refills: y  9. Substances: n  10. Therapy: n  11. Medication compliant: y  12. No SI HI AVH.      7/19: Virtual visit via Zoom audio and video due to the COVID-19 pandemic.  Patient is accepting of and agreeable to visit.  The visit consisted of the patient and I. The patient is at home, and I am at the office.  Interview:  13. Chart review: No new.  14. Planning: No changes made at " "last visit 3 months ago, patient was where she needed to be.  15. \"Doing well.\"  a. Dog is sick, so \"I'm a little shaky.\"  b. Fearful that whatever the dog has is contagious.  c. Sleeping well.  d. Buspar not working as well like it did.  16. Mood/Depression: 6/10, still has depressed mood, guilt, poor energy and concentration.  17. Anxiety: 8/10, due to situation (5 teenagers),  is 78 yo, dizzy all the time  a. Worrying, on edge, \"shaky,\" restless, muscle tension  18. Panic attacks: no  19. Sleeping: well  20. Eating: stable  21. Refills: y   22. Substances: n  23. Therapy: n  24. Medication compliant: y  25. No SI HI AVH.      4/19: Virtual visit via Zoom audio and video due to the COVID-19 pandemic.  Patient is accepting of and agreeable to visit.  The visit consisted of the patient and I. The patient is at home, and I am at the office.  Interview:  26. Chart review: Patient has now started gabapentin 300 mg twice daily.  This had previously been prescribed by dermatology for chronic scalp pain following biopsies.  She was seen by primary care March 14 and was doing well from a mental health standpoint.  CMP in March show slightly elevated creatinine 1.09, alk phos 125, otherwise reassuring CMP, thyroid studies, vitamin D, CBC.  Triglycerides are elevated.  27. \"Things are going good.\"  a. Gabapentin helps with anxiety. Morning and evening.  b. Buspar helps as well.  c. Sleeping well.   d. \"I believe I am where I need to be.\"  28. Refills: y  29. Therapy: n  30. Medication compliant: y  31. No SI HI AVH.      3/8: Virtual visit via Zoom audio and video due to the COVID-19 pandemic.  Patient is accepting of and agreeable to visit.  The visit consisted of the patient and I. The patient is at home, and I am at the office.  Interview:  32. Chart review: No new developments  33. \" I get depressed at 4:00 every day.\"  a. Lots of activity at this time.  Kids getting food, having to take care of the " "animals.  ismael Rhodes believes patient gets overwhelmed during this time.  After she mention this, the patient agreed.  c. Has never tried BuSpar.  Has been on gabapentin in the past, is not presently on it.  Was on it for neuropathic pain.  d. Otherwise depression and anxiety are extremely well controlled.  Increasing Effexor was helpful for the patient's anxiety.  e. Generally sleeping well.  34. Medication compliant: Yes  35. No SI HI AVH.      11/8: Virtual visit via Zoom audio and video due to the COVID-19 pandemic.  Patient is accepting of and agreeable to visit.  The visit consisted of the patient and I.  Interview:  36. Chart review: Seen by primary care October 8.  Hypertension well controlled.  Labs demonstrate elevated CO2 of 29.4 on CMP, creatinine is 1.00 normal, thyroid studies are normal, elevated triglycerides, normal CBC.  Patient underwent an MRI for hallucinations and it shows no acute/nothing.  37. \"Pretty good.\"  38. Depression/Mood: \"depressed in the afternoon.\"  a. Lifts after she takes her medication.  b. Seasonal component  c. [facility] and its weather, and her family.  1. Anhedonia: denies  2. Guilt or hopelessness: denies  3. Energy: down until the afternoon  4. Concentration: sometimes  5. Weight loss or weight gain: lost 20 lbs over a year  6. Psychomotor retardation or agitation: possible retardation  7. Insomnia: denies  39. Anxiety:  1. Uncontrolled worrying: yes  2. Muscle tension: sometimes  3. Fatigue: down until the afternoon  4. Concentration: sometimes  5. Restlessness/feeling on edge: sometimes in the day, not at night  6. Irritability: sometimes  7. Insomnia: denies  40. Sleeping: yes  41. Eating: well  42. Substances: denies  43. Therapy: deferred  44. Medication compliant: some issues with compliance (on accident)  a. Taking 150 mg of venlafaxine for the last week, rather than 75 mg due to pharmacy error.  45. No SI HI AVH.  a. Sees bugs, but they actually have a bug issue " "in the apartment. (ladybugs)  ismael Rhodes does not believe she is hallucinating.      9/7: Virtual visit via Zoom audio and video due to the COVID-19 pandemic.  Patient is accepting of and agreeable to visit.  The visit consisted of the patient and I.  Interview:  46. Records review shows no new developments.  47. \"Everything's pretty good.\"  48. No hallucinations at all. Might see a shadow.  49. Depression better.  50.  doing better, but has dizziness.  51. Meagan in agreement.  52. No SI HI AVH.      7/27: Virtual visit via Zoom audio and video due to the COVID-19 pandemic. Patient is accepting of and agreeable to appointment. The appointment consisted of the patient and I only.   Interview: \"Good\"   1. Having VH less frequently, once or twice a week. Like someone passes me real fast. Not experiencing spiders.  2. Sleeping well.  3.  doing better. Worrying is much better.  4. Medicine has been helpful.  5. Still feels depressed. Homesick for [place].   6. No panic attacks in last two weeks.  7. Much clearer today, linear. Better historian.  8. Meagan agrees.  9. No SI HI AVH.    Previous:  Still having panic attacks once a week. Sx: soa, sweating, palpitations, tachycardia, fear, no derealization/depersonalization, last 30 min, leaves room where she was at. No triggers. Fear of another panic attack happening.     Anxiety due to 's health; admitted to hospital recently for dehydration. Notes uncontrolled worrying, muscle tension, irritability, restlessness, trouble sleeping - will wake at 3 am and can't go back to sleep. Duration is at least 6 months, since moved here.    Still having hallucinations every night before she goes to bed. Sees shadows walk past bed. No AH. Sometimes sees spiders during the day on the floor; happens once or twice a week.     Seroquel helped to some extent.     Again, patient is somewhat of a poor historian. Often times she will answer a question with \"no,\" and then " contradict her self moments later.    Collateral from Meagan: patient did have a UTI (UA confirmed); was put on a course of abx. Feels her mom's confusion has improved since then.      Past Surgical History:  Past Surgical History:   Procedure Laterality Date   • CATARACT EXTRACTION  2002   • CHOLECYSTECTOMY OPEN  1971       Problem List:  Patient Active Problem List   Diagnosis   • Forgetfulness   • Anxiety   • Depression   • Esophageal reflux   • Hallucinations   • Head injury   • Hypertension   • Other acute sinusitis   • PTSD (post-traumatic stress disorder)   • Mixed hyperlipidemia   • Acquired hypothyroidism   • Vitamin D deficiency   • Neuropathic pain       Allergy:   Allergies   Allergen Reactions   • Floxin Otic [Ofloxacin] Rash        Discontinued Medications:  Medications Discontinued During This Encounter   Medication Reason   • QUEtiapine (SEROquel) 25 MG tablet Reorder   • venlafaxine XR (EFFEXOR-XR) 150 MG 24 hr capsule Reorder   • busPIRone (BUSPAR) 10 MG tablet Reorder       Current Medications:   Current Outpatient Medications   Medication Sig Dispense Refill   • acetaminophen (TYLENOL) 500 MG tablet Take 500 mg by mouth Every 6 (Six) Hours As Needed for Mild Pain.     • aspirin 81 MG EC tablet aspirin 81 mg oral tablet,delayed release (DR/EC) take 1 tablet (81 mg) by oral route once daily   Active     • atorvastatin (LIPITOR) 40 MG tablet Take 1 tablet by mouth Daily. 90 tablet 1   • [START ON 10/17/2022] busPIRone (BUSPAR) 10 MG tablet Take 1 tablet by mouth Daily. At 3 pm 90 tablet 1   • ergocalciferol (ERGOCALCIFEROL) 1.25 MG (91540 UT) capsule Take 1 capsule by mouth 1 (One) Time Per Week. 13 capsule 1   • gabapentin (NEURONTIN) 300 MG capsule Take 1 capsule by mouth 2 (Two) Times a Day. 60 capsule 2   • levothyroxine (SYNTHROID, LEVOTHROID) 75 MCG tablet Take 1 tablet by mouth Daily. 90 tablet 1   • lisinopril (PRINIVIL,ZESTRIL) 10 MG tablet Take 1 tablet by mouth Daily. 90 tablet 1   •  "metoprolol tartrate (LOPRESSOR) 25 MG tablet metoprolol tartrate 25 mg oral tablet take 2 tablets (50 mg) by oral route once daily   Active     • omeprazole (priLOSEC) 40 MG capsule Take 1 capsule by mouth Daily. 90 capsule 1   • [START ON 10/9/2022] QUEtiapine (SEROquel) 25 MG tablet Take 2 tablets by mouth Every Night. 180 tablet 1   • [START ON 10/17/2022] venlafaxine XR (EFFEXOR-XR) 150 MG 24 hr capsule Take 1 capsule by mouth Daily. 90 capsule 1   • Zinc Sulfate (ZINC 15 PO) Take  by mouth.     • Ascorbic Acid (Vitamin C) 250 MG chewable tablet Chew.     • multivitamin with minerals tablet tablet Take 1 tablet by mouth Daily.       No current facility-administered medications for this visit.       Past Medical History:  Past Medical History:   Diagnosis Date   • Acid reflux    • Allergies    • Anxiety    • Depression    • Forgetfulness    • Hallucinations 04/15/2021   • Head injury    • HTN (hypertension)    • PTSD (post-traumatic stress disorder)          Social History     Socioeconomic History   • Marital status: Unknown   Tobacco Use   • Smoking status: Never Smoker   • Smokeless tobacco: Never Used   Vaping Use   • Vaping Use: Never used   Substance and Sexual Activity   • Alcohol use: Never   • Drug use: Never   • Sexual activity: Not Currently         Family History   Problem Relation Age of Onset   • Stroke Mother    • Heart disease Mother    • Stroke Father    • Heart disease Father    • Diabetes Father    • Stroke Sister    • Diabetes Sister    • Stroke Brother    • Diabetes Brother    • Diabetes Other         AUNT/UNCLE   • Anxiety disorder Other        Mental Status Exam:   Hygiene:   good, at home  Cooperation:  Cooperative  Eye Contact:  Good  Psychomotor Behavior:  Appropriate  Affect:  Appropriate, mood congruent  Mood: \"I'm better\"  Speech:  Normal  Thought Process:  Goal directed  Thought Content:  Normal and Mood congruent  Suicidal:  None  Homicidal:  None  Hallucinations:  None  Delusion:  " None  Memory:  Deficits  Orientation:  Grossly intact  Reliability:  fair  Insight:  Fair  Judgement:  Fair  Impulse Control:  Fair  Physical/Medical Issues:  No      Review of Systems:  Review of Systems   Constitutional: Positive for fatigue. Negative for diaphoresis.   HENT: Negative for drooling.    Eyes: Negative for visual disturbance.   Respiratory: Negative for cough and shortness of breath.    Cardiovascular: Negative for chest pain, palpitations and leg swelling.   Gastrointestinal: Negative for nausea and vomiting.   Endocrine: Positive for cold intolerance. Negative for heat intolerance.   Musculoskeletal: Negative for joint swelling.   Allergic/Immunologic: Negative for immunocompromised state.   Neurological: Negative for dizziness and seizures.   Hematological: Negative for adenopathy.   Psychiatric/Behavioral: Negative for sleep disturbance.         Physical Exam:  Physical Exam    Vital Signs:   There were no vitals taken for this visit.     Lab Results:   Office Visit on 03/14/2022   Component Date Value Ref Range Status   • Glucose 03/14/2022 94  65 - 99 mg/dL Final   • BUN 03/14/2022 15  8 - 23 mg/dL Final   • Creatinine 03/14/2022 1.09 (A) 0.57 - 1.00 mg/dL Final   • Sodium 03/14/2022 142  136 - 145 mmol/L Final   • Potassium 03/14/2022 3.8  3.5 - 5.2 mmol/L Final   • Chloride 03/14/2022 102  98 - 107 mmol/L Final   • CO2 03/14/2022 28.0  22.0 - 29.0 mmol/L Final   • Calcium 03/14/2022 9.7  8.6 - 10.5 mg/dL Final   • Total Protein 03/14/2022 7.6  6.0 - 8.5 g/dL Final   • Albumin 03/14/2022 4.10  3.50 - 5.20 g/dL Final   • ALT (SGPT) 03/14/2022 13  1 - 33 U/L Final   • AST (SGOT) 03/14/2022 15  1 - 32 U/L Final   • Alkaline Phosphatase 03/14/2022 125 (A) 39 - 117 U/L Final   • Total Bilirubin 03/14/2022 0.2  0.0 - 1.2 mg/dL Final   • Globulin 03/14/2022 3.5  gm/dL Final   • A/G Ratio 03/14/2022 1.2  g/dL Final   • BUN/Creatinine Ratio 03/14/2022 13.8  7.0 - 25.0 Final   • Anion Gap 03/14/2022  12.0  5.0 - 15.0 mmol/L Final   • eGFR 03/14/2022 53.8 (A) >60.0 mL/min/1.73 Final    National Kidney Foundation and American Society of Nephrology (ASN) Task Force recommended calculation based on the Chronic Kidney Disease Epidemiology Collaboration (CKD-EPI) equation refit without adjustment for race.   • TSH 03/14/2022 1.040  0.270 - 4.200 uIU/mL Final   • Total Cholesterol 03/14/2022 169  0 - 200 mg/dL Final   • Triglycerides 03/14/2022 197 (A) 0 - 150 mg/dL Final   • HDL Cholesterol 03/14/2022 46  40 - 60 mg/dL Final   • LDL Cholesterol  03/14/2022 90  0 - 100 mg/dL Final   • VLDL Cholesterol 03/14/2022 33  5 - 40 mg/dL Final   • LDL/HDL Ratio 03/14/2022 1.82   Final   • 25 Hydroxy, Vitamin D 03/14/2022 44.1  30.0 - 100.0 ng/ml Final   • WBC 03/14/2022 8.03  3.40 - 10.80 10*3/mm3 Final   • RBC 03/14/2022 4.67  3.77 - 5.28 10*6/mm3 Final   • Hemoglobin 03/14/2022 12.6  12.0 - 15.9 g/dL Final   • Hematocrit 03/14/2022 38.7  34.0 - 46.6 % Final   • MCV 03/14/2022 82.9  79.0 - 97.0 fL Final   • MCH 03/14/2022 27.0  26.6 - 33.0 pg Final   • MCHC 03/14/2022 32.6  31.5 - 35.7 g/dL Final   • RDW 03/14/2022 13.2  12.3 - 15.4 % Final   • RDW-SD 03/14/2022 39.7  37.0 - 54.0 fl Final   • MPV 03/14/2022 11.2  6.0 - 12.0 fL Final   • Platelets 03/14/2022 305  140 - 450 10*3/mm3 Final   • Neutrophil % 03/14/2022 79.9 (A) 42.7 - 76.0 % Final   • Lymphocyte % 03/14/2022 11.2 (A) 19.6 - 45.3 % Final   • Monocyte % 03/14/2022 6.6  5.0 - 12.0 % Final   • Eosinophil % 03/14/2022 1.4  0.3 - 6.2 % Final   • Basophil % 03/14/2022 0.5  0.0 - 1.5 % Final   • Immature Grans % 03/14/2022 0.4  0.0 - 0.5 % Final   • Neutrophils, Absolute 03/14/2022 6.42  1.70 - 7.00 10*3/mm3 Final   • Lymphocytes, Absolute 03/14/2022 0.90  0.70 - 3.10 10*3/mm3 Final   • Monocytes, Absolute 03/14/2022 0.53  0.10 - 0.90 10*3/mm3 Final   • Eosinophils, Absolute 03/14/2022 0.11  0.00 - 0.40 10*3/mm3 Final   • Basophils, Absolute 03/14/2022 0.04  0.00 - 0.20  10*3/mm3 Final   • Immature Grans, Absolute 03/14/2022 0.03  0.00 - 0.05 10*3/mm3 Final   • nRBC 03/14/2022 0.0  0.0 - 0.2 /100 WBC Final   • Amphetamine Screen, Urine 03/14/2022 Negative  Negative Final   • AMP INTERNAL CONTROL 03/14/2022 Passed  Passed Final   • Barbiturates Screen, Urine 03/14/2022 Negative  Negative Final   • BARBITURATE INTERNAL CONTROL 03/14/2022 Passed  Passed Final   • Buprenorphine, Screen, Urine 03/14/2022 Negative  Negative Final   • BUPRENORPHINE INTERNAL CONTROL 03/14/2022 Passed  Passed Final   • Benzodiazepine Screen, Urine 03/14/2022 Negative  Negative Final   • BENZODIAZEPINE INTERNAL CONTROL 03/14/2022 Passed  Passed Final   • Cocaine Screen, Urine 03/14/2022 Negative  Negative Final   • COCAINE INTERNAL CONTROL 03/14/2022 Passed  Passed Final   • MDMA (ECSTASY) 03/14/2022 Negative  Negative Final   • MDMA (ECSTASY) INTERNAL CONTROL 03/14/2022 Passed  Passed Final   • Methamphetamine, Ur 03/14/2022 Negative  Negative Final   • METHAMPHETAMINE INTERNAL CONTROL 03/14/2022 Passed  Passed Final   • Methadone Screen, Urine 03/14/2022 Negative  Negative Final   • METHADONE INTERNAL CONTROL 03/14/2022 Passed  Passed Final   • Opiate Screen 03/14/2022 Negative  Negative Final   • OPIATES INTERNAL CONTROL 03/14/2022 Passed  Passed Final   • Oxycodone Screen, Urine 03/14/2022 Negative  Negative Final   • OXYCODONE INTERNAL CONTROL 03/14/2022 Passed  Passed Final   • Phencyclidine (PCP), Urine 03/14/2022 Negative  Negative Final   • PHENCYCLIDINE INTERNAL CONTROL 03/14/2022 Passed  Passed Final   • THC, Screen, Urine 03/14/2022 Negative  Negative Final   • THC INTERNAL CONTROL 03/14/2022 Passed  Passed Final   • Lot Number 03/14/2022 [id number]   Final   • Expiration Date 03/14/2022 3/31/23   Final       EKG Results:  No orders to display       Imaging Results:  No Images in the past 120 days found..      Assessment & Plan   Diagnoses and all orders for this visit:    1. Major depressive  disorder, recurrent episode, moderate (HCC) (Primary)  -     venlafaxine XR (EFFEXOR-XR) 150 MG 24 hr capsule; Take 1 capsule by mouth Daily.  Dispense: 90 capsule; Refill: 1    2. Generalized anxiety disorder  -     venlafaxine XR (EFFEXOR-XR) 150 MG 24 hr capsule; Take 1 capsule by mouth Daily.  Dispense: 90 capsule; Refill: 1  -     busPIRone (BUSPAR) 10 MG tablet; Take 1 tablet by mouth Daily. At 3 pm  Dispense: 90 tablet; Refill: 1    3. Hallucinations  -     QUEtiapine (SEROquel) 25 MG tablet; Take 2 tablets by mouth Every Night.  Dispense: 180 tablet; Refill: 1    4. Dementia without behavioral disturbance, unspecified dementia type (HCC)    5. Forgetfulness    6. Neuropathic pain      Presentation most consistent with developing dementia, likely Lewy body dementia.  Patient also reports worsening gait abnormalities; she fell in the summer of last year, injuring her head, and requiring stitches.  Patient also has a history of depression, possible PTSD by review of chart.  Patient did not mention PTSD during the interview, but she is not a very good historian.  Often times she will say no to a question, and then contradict herself only a few moments later.  She has some trouble with knowing her medications or her medical conditions.    8/31: Pt's anxiety comes from taking care of her father, per Meagan. He's dealing with depression. Meagan is stressed. Discussed getting more help to take care of pt and her father. Pt is better. No changes to meds. 20 minutes of supportive psychotherapy with goal to strengthen defenses, promote problems solving, restore adaptive functioning and provide symptom relief. The therapeutic alliance was strengthened to encourage the patient to express their thoughts and feelings. Esteem building was enhanced through praise, reassurance, normalizing and encouragement. Coping skills were enhanced to build distress tolerance skills and emotional regulation. Allowed patient to freely  discuss issues without interruption or judgement with unconditional positive regard, active listening skills, and empathy. Provided a safe, confidential environment to facilitate the development of a positive therapeutic relationship and encourage open, honest communication. Assisted patient in identifying risk factors which would indicate the need for higher level of care including thoughts to harm self or others and/or self-harming behavior and encouraged patient to contact this office, call 911, or present to the nearest emergency room should any of these events occur. Assisted patient in processing session content; acknowledged and normalized patient’s thoughts, feelings, and concerns by utilizing a person-centered approach in efforts to build appropriate rapport and a positive therapeutic relationship with open and honest communication. Patient given education on medication side effects, diagnosis/illness and relapse symptoms. Plan to continue supportive psychotherapy in next appointment to provide symptom relief.  Diagnoses: as above  Symptoms: as above  Functional status: good  Mental Status Exam: as above    Treatment plan: Medication management and supportive psychotherapy  Prognosis: good  Progress: improving  3 mos      7/19: Increase effexor to target residual anx and dep.  Consider increasing BuSpar soon at next appointment.  17 minutes of supportive psychotherapy Treatment plan: Medication management and supportive psychotherapy  Prognosis: Good  Progress: Worsened anxiety and depression  6 weeks    4/19: Patient is well and right where she should be.  7 minutes of supportive psychotherapy3 months    3/8: Target the feeling of anxiety at 4 PM by starting BuSpar at 3 PM.  Consider switching to gabapentin if BuSpar does not help.  Would start at a low dose of 100 mg.  16 minutes of supportive psychotherapy5 weeks    11/8: Depressed in the mornings. Light box, increase venlafaxine. 17 minutes of  supportive psychotherapy  8 wks      9/7: Doing even better.  8 weeks.  No changes.    7/27: Markedly improved, and far more linear.  Still has residual anxiety and depression.  Hallucinations are still present but they do not cause distress at all to the patient.  Continue Seroquel at the present dose.  Double the dose of venlafaxine. See back in 6 weeks.  This was also discussed with Meagan, patient's daughter.    Previous:  Caution again advised in terms of increasing the dose given the likely sensitivity she has to antipsychotics.  Psychotherapy is deferred at this time.  S/p referral to neurologist is appropriate for further management.     Visit Diagnoses:    ICD-10-CM ICD-9-CM   1. Major depressive disorder, recurrent episode, moderate (HCC)  F33.1 296.32   2. Generalized anxiety disorder  F41.1 300.02   3. Hallucinations  R44.3 780.1   4. Dementia without behavioral disturbance, unspecified dementia type (HCC)  F03.90 294.20   5. Forgetfulness  R68.89 780.99   6. Neuropathic pain  M79.2 729.2       PLAN:  53. Risk Assessment: Risk of self-harm acutely is low. Risk factors include anxiety disorder, depressive disorder, access to weapons, recent psychosocial stressors (pandemic). Protective factors include no family history, no present SI, no history of suicide attempts or self-harm in the past, no access to weapons, no AODA, healthcare seeking, future orientation, willingness to engage in care. Risk of self-harm chronically is also low, but could be further elevated in the event of treatment noncompliance and/or AODA.  54. Safety: No acute safety concerns.  55. Medications:   a. CONTINUE quetiapine 50 mg nightly. Risks, benefits, alternatives discussed with patient including nausea and vomiting, GI upset, sedation, akathisia, hypotension, increased appetite, lowering of seizure threshold, theoretical risk of tardive dyskinesia, extrapyramidal symptoms, restless legs syndrome. After discussion of these risks  and benefits, the patient voiced understanding and agreed to proceed.  b. STATUS POST   i. sertraline 100 mg daily.  Not effective.  c. INCREASE venlafaxine 112.5 to 150 mg PO QDAY. Risks, benefits, alternatives discussed with patient including GI upset, nausea vomiting diarrhea, theoretical decrease of seizure threshold predisposing the patient to a slightly higher seizure risk, headaches, sexual dysfunction, serotonin syndrome, bleeding risk, increased suicidality in patients 24 years and younger.  After discussion of these risks and benefits, the patient voiced understanding and agreed to proceed.  d. CONTINUE BuSpar 10 mg at 3 PM daily. Risks, benefits, alternatives discussed with patient including nausea, GI upset, mild sedation, falls risk.  After discussion of these risks and benefits, the patient voiced understanding and agreed to proceed.  e. AGREE with gabapentin 300 twice daily.  56. Therapy: Deferred.  57. Other: s/p referral to neurology for workup of likely dementia.  58. Follow Up: 3 mos      TREATMENT PLAN/GOALS: Continue supportive psychotherapy efforts and medications as indicated. Treatment and medication options discussed during today's visit. Patient ackowledged and verbally consented to continue with current treatment plan and was educated on the importance of compliance with treatment and follow-up appointments.    MEDICATION ISSUES:  ERNESTINA reviewed as expected.  Discussed medication options and treatment plan of prescribed medication as well as the risks, benefits, and side effects including potential falls, possible impaired driving and metabolic adversities among others. Patient is agreeable to call the office with any worsening of symptoms or onset of side effects. Patient is agreeable to call 911 or go to the nearest ER should he/she begin having SI/HI. No medication side effects or related complaints today.     MEDS ORDERED DURING VISIT:  New Medications Ordered This Visit   Medications    • venlafaxine XR (EFFEXOR-XR) 150 MG 24 hr capsule     Sig: Take 1 capsule by mouth Daily.     Dispense:  90 capsule     Refill:  1     Replaces old script (37.0awh7=038.5 mg daily). Thank you.   • QUEtiapine (SEROquel) 25 MG tablet     Sig: Take 2 tablets by mouth Every Night.     Dispense:  180 tablet     Refill:  1   • busPIRone (BUSPAR) 10 MG tablet     Sig: Take 1 tablet by mouth Daily. At 3 pm     Dispense:  90 tablet     Refill:  1       Return in about 3 months (around 11/30/2022).         This document has been electronically signed by Riki Peterson MD  August 31, 2022 11:31 EDT      Part of this note may be an electronic transcription/translation of spoken language to printed text using the Dragon Dictation System.

## 2022-09-12 NOTE — TELEPHONE ENCOUNTER
Patient has not been in the office within 6 months. Called patient's daughter to see if she needed any refills. The daughter was not home at the moment but will give our office a call back to let us know. Also informed daughter that her mother needs an appointment and labs.

## 2022-09-13 RX ORDER — LISINOPRIL 10 MG/1
TABLET ORAL
Qty: 90 TABLET | Refills: 1 | Status: SHIPPED | OUTPATIENT
Start: 2022-09-13 | End: 2023-03-10

## 2022-09-13 RX ORDER — ATORVASTATIN CALCIUM 40 MG/1
TABLET, FILM COATED ORAL
Qty: 90 TABLET | Refills: 1 | Status: SHIPPED | OUTPATIENT
Start: 2022-09-13 | End: 2023-03-10

## 2022-09-22 ENCOUNTER — TELEPHONE (OUTPATIENT)
Dept: INTERNAL MEDICINE | Facility: CLINIC | Age: 72
End: 2022-09-22

## 2022-09-22 NOTE — TELEPHONE ENCOUNTER
Called patient. No answer; left VM for patient to give the office a call back on behalf of a medication request.

## 2022-09-22 NOTE — TELEPHONE ENCOUNTER
Called patient. No answer; left VM for patient to give the office a call back on behalf of a medication that she had called the pharmacy about.    HUB PLEASE ADVISE  Does patient need her Vitamin D sent to the pharmacy?

## 2022-09-28 NOTE — TELEPHONE ENCOUNTER
Patients last vitamin d level was 44.1 which is normal on 3/14/22. Is patient to continue the vitamin d?

## 2022-09-29 RX ORDER — ERGOCALCIFEROL 1.25 MG/1
CAPSULE ORAL
Qty: 13 CAPSULE | Refills: 1 | Status: SHIPPED | OUTPATIENT
Start: 2022-09-29 | End: 2023-03-16

## 2022-10-09 DIAGNOSIS — F33.1 MAJOR DEPRESSIVE DISORDER, RECURRENT EPISODE, MODERATE: ICD-10-CM

## 2022-10-09 DIAGNOSIS — F41.1 GENERALIZED ANXIETY DISORDER: ICD-10-CM

## 2022-10-10 RX ORDER — VENLAFAXINE 37.5 MG/1
112.5 TABLET ORAL DAILY
Qty: 90 TABLET | Refills: 2 | OUTPATIENT
Start: 2022-10-10

## 2022-10-10 NOTE — TELEPHONE ENCOUNTER
The original prescription was discontinued on 7/19/2022 by Riki Peterson MD for the following reason: Duplicate order. Renewing this prescription may not be appropriate.    SNRI Protocol Passed 10/09/2022 01:06 AM   Protocol Details  No active pregnancy on record    No positive pregnancy test in past 12 months    Blood Pressure on record in past 12 months    PHQ-2 or PHQ-9 within last 12 Mo    Recent or Future Visit (12mo/30days)    No BP Higher than 180/110 in past 12 Mo     NEXT APPT WITH PROVIDER  Appointment with Riki Peterson MD (11/30/2022)    LAST MED REFILL  venlafaxine XR (EFFEXOR-XR) 150 MG 24 hr capsule (10/17/2022)    REFILL REQUEST IS DIFFERENT FROM MOST RECENTLY REFILLED DOSAGE    PROVIDER PLEASE ADVISE

## 2022-10-11 RX ORDER — LEVOTHYROXINE SODIUM 0.07 MG/1
TABLET ORAL
Qty: 90 TABLET | Refills: 1 | Status: SHIPPED | OUTPATIENT
Start: 2022-10-11 | End: 2023-02-27

## 2022-11-11 DIAGNOSIS — M79.2 NEUROPATHIC PAIN: ICD-10-CM

## 2022-11-11 RX ORDER — GABAPENTIN 300 MG/1
CAPSULE ORAL
Qty: 60 CAPSULE | Refills: 2 | Status: SHIPPED | OUTPATIENT
Start: 2022-11-11 | End: 2023-01-31 | Stop reason: SDUPTHER

## 2022-11-11 NOTE — TELEPHONE ENCOUNTER
MEDICATION REFILL REQUEST FOR GABAPENTIN 300MG. PT HAS UPCOMING APPT ON 11/30/2022. MEDICATION ORDER PENDED.

## 2022-11-14 RX ORDER — OMEPRAZOLE 40 MG/1
40 CAPSULE, DELAYED RELEASE ORAL DAILY
Qty: 90 CAPSULE | Refills: 1 | Status: SHIPPED | OUTPATIENT
Start: 2022-11-14

## 2022-11-30 ENCOUNTER — TELEMEDICINE (OUTPATIENT)
Dept: PSYCHIATRY | Facility: CLINIC | Age: 72
End: 2022-11-30

## 2022-11-30 DIAGNOSIS — M79.2 NEUROPATHIC PAIN: ICD-10-CM

## 2022-11-30 DIAGNOSIS — F33.1 MAJOR DEPRESSIVE DISORDER, RECURRENT EPISODE, MODERATE: ICD-10-CM

## 2022-11-30 DIAGNOSIS — R68.89 FORGETFULNESS: ICD-10-CM

## 2022-11-30 DIAGNOSIS — R44.3 HALLUCINATIONS: ICD-10-CM

## 2022-11-30 DIAGNOSIS — F41.1 GENERALIZED ANXIETY DISORDER: Primary | ICD-10-CM

## 2022-11-30 PROCEDURE — 90833 PSYTX W PT W E/M 30 MIN: CPT | Performed by: STUDENT IN AN ORGANIZED HEALTH CARE EDUCATION/TRAINING PROGRAM

## 2022-11-30 PROCEDURE — 99214 OFFICE O/P EST MOD 30 MIN: CPT | Performed by: STUDENT IN AN ORGANIZED HEALTH CARE EDUCATION/TRAINING PROGRAM

## 2022-11-30 RX ORDER — VENLAFAXINE 37.5 MG/1
112.5 TABLET ORAL DAILY
COMMUNITY
Start: 2022-09-04 | End: 2023-01-31 | Stop reason: SDUPTHER

## 2022-11-30 RX ORDER — QUETIAPINE FUMARATE 25 MG/1
TABLET, FILM COATED ORAL
Qty: 180 TABLET | Refills: 3 | Status: SHIPPED | OUTPATIENT
Start: 2022-11-30

## 2022-11-30 NOTE — PATIENT INSTRUCTIONS
1.  Please return to clinic at your next scheduled visit.  Contact the clinic ([phone]) at least 24 hours prior in the event you need to cancel.  2.  Do no harm to yourself or others.    3.  Avoid alcohol and drugs.    4.  Take all medications as prescribed.  Please contact the clinic with any concerns. If you are in need of medication refills, please call the clinic at [phone].    5. Should you want to get in touch with your provider, Dr. Riki Peterson, please utilize [employer] or contact the office ([phone]), and staff will be able to page Dr. Peterson directly.  6.  In the event you have personal crisis, contact the following crisis numbers: Suicide Prevention Hotline 1-543.926.8357; [facility] Helpline 2-441-686-WBXG; [facility] [phone]; text HELLO to 430585; or 704.     SPECIFIC RECOMMENDATIONS:     1.      Medications discussed at this encounter:                   - increase seroquel     2.      Psychotherapy recommendations:      3.     Return to clinic: 2 mos

## 2022-11-30 NOTE — PROGRESS NOTES
"Subjective   Payal Singer is a 72 y.o. female who presents today for follow up    Chief Complaint:  mdd meagan    History of Present Illness:     Chart review 4/15: Seen 3/30 to establish care. Hx of anx/dep avh in [place]. on both sertraline and venlafaxine, was on Seroquel. Hx of HTN, GERD. On gabapentin 600 tid, sertraline 200 d, venla 150 d, quetiap 25 qhs. March 2021 labs: cbc shows low mch, high rdw; Cr 1.15 high, abnormal lipids, TSH and fT4 wnl. No OP head imaging, ekg.     \"Meagan and Payal\"    11/30: Virtual visit via Zoom audio and video due to the COVID-19 pandemic.  Patient is accepting of and agreeable to visit.  The visit consisted of the patient and I. The patient is at home, and I am at the office.  Interview:  1. Chart review: No new.  2. Planning: No changes at last visit.  Check-in with Meagan regarding how well she is doing, consider referring to psychotherapy.  3. \"Doing good.\"  a.  has his days; kind unless he misses his meds. Anger issues.  4. Mood/Depression:  a. 3 pm: gets depressed, lasts till 7 pm.  5. Anxiety:  a. Around 3 pm I get agitated and upset. No trigger. On edge, restless, also lasts till 7 pm  6. Panic attacks: n  7. Energy: fair  8. Concentration: fair  9. Sleeping: well  10. Eating: stable  11. Refills: n  12. Substances: n  13. Therapy: n  14. Medication compliant: y  15. No SI HI AVH.      8/31: Virtual visit via Zoom audio and video due to the COVID-19 pandemic.  Patient is accepting of and agreeable to visit.  The visit consisted of the patient and I. The patient is at home, and I am at the office.  Interview:  16. Chart review: No new.  17. Planning: Increased Effexor at last visit.  Targeting anxiety.  18. \"I'm good.\"  a. Better control of the anxiety.  i. Situational: both taking care of her   ii. Anxiety improved on the higher dose of effexor. 5/10.  iii. Agrees she is in a good place.  iv. Had crying spells when ran out of effexor  b. Per Meagan, most of it " "comes from her dad's depression and irritability. He gets verbal.  i. Trying to get him to see a psychiatrist  ii. Situational  iii. We discussed behavioral options  1. Worse at night  2. Triggers -- unexpected, can't anticipate  3. Dealing with vertigo  4. Family meeting -- agrees to trying that  5. Meagan tearful during visit  19. Mood/Depression: 5/10, a little better, but still some depressed mood  20. Panic attacks: n  21. Sleeping: well  22. Eating: stable  23. Refills: y  24. Substances: n  25. Therapy: n  26. Medication compliant: y  27. No SI HI AVH.      7/19: Virtual visit via Zoom audio and video due to the COVID-19 pandemic.  Patient is accepting of and agreeable to visit.  The visit consisted of the patient and I. The patient is at home, and I am at the office.  Interview:  28. Chart review: No new.  29. Planning: No changes made at last visit 3 months ago, patient was where she needed to be.  30. \"Doing well.\"  a. Dog is sick, so \"I'm a little shaky.\"  b. Fearful that whatever the dog has is contagious.  c. Sleeping well.  d. Buspar not working as well like it did.  31. Mood/Depression: 6/10, still has depressed mood, guilt, poor energy and concentration.  32. Anxiety: 8/10, due to situation (5 teenagers),  is 78 yo, dizzy all the time  a. Worrying, on edge, \"shaky,\" restless, muscle tension  33. Panic attacks: no  34. Sleeping: well  35. Eating: stable  36. Refills: y   37. Substances: n  38. Therapy: n  39. Medication compliant: y  40. No SI HI AVH.      4/19: Virtual visit via Zoom audio and video due to the COVID-19 pandemic.  Patient is accepting of and agreeable to visit.  The visit consisted of the patient and I. The patient is at home, and I am at the office.  Interview:  41. Chart review: Patient has now started gabapentin 300 mg twice daily.  This had previously been prescribed by dermatology for chronic scalp pain following biopsies.  She was seen by primary care March 14 and was " "doing well from a mental health standpoint.  CMP in March show slightly elevated creatinine 1.09, alk phos 125, otherwise reassuring CMP, thyroid studies, vitamin D, CBC.  Triglycerides are elevated.  42. \"Things are going good.\"  a. Gabapentin helps with anxiety. Morning and evening.  b. Buspar helps as well.  c. Sleeping well.   d. \"I believe I am where I need to be.\"  43. Refills: y  44. Therapy: n  45. Medication compliant: y  46. No SI HI AVH.      3/8: Virtual visit via Zoom audio and video due to the COVID-19 pandemic.  Patient is accepting of and agreeable to visit.  The visit consisted of the patient and I. The patient is at home, and I am at the office.  Interview:  47. Chart review: No new developments  48. \" I get depressed at 4:00 every day.\"  a. Lots of activity at this time.  Kids getting food, having to take care of the animals.  bEs Rhodes believes patient gets overwhelmed during this time.  After she mention this, the patient agreed.  c. Has never tried BuSpar.  Has been on gabapentin in the past, is not presently on it.  Was on it for neuropathic pain.  d. Otherwise depression and anxiety are extremely well controlled.  Increasing Effexor was helpful for the patient's anxiety.  e. Generally sleeping well.  49. Medication compliant: Yes  50. No SI HI AVH.      11/8: Virtual visit via Zoom audio and video due to the COVID-19 pandemic.  Patient is accepting of and agreeable to visit.  The visit consisted of the patient and I.  Interview:  51. Chart review: Seen by primary care October 8.  Hypertension well controlled.  Labs demonstrate elevated CO2 of 29.4 on CMP, creatinine is 1.00 normal, thyroid studies are normal, elevated triglycerides, normal CBC.  Patient underwent an MRI for hallucinations and it shows no acute/nothing.  52. \"Pretty good.\"  53. Depression/Mood: \"depressed in the afternoon.\"  a. Lifts after she takes her medication.  b. Seasonal component  c. Misses [place] and its weather, and " "her family.  1. Anhedonia: denies  2. Guilt or hopelessness: denies  3. Energy: down until the afternoon  4. Concentration: sometimes  5. Weight loss or weight gain: lost 20 lbs over a year  6. Psychomotor retardation or agitation: possible retardation  7. Insomnia: denies  54. Anxiety:  1. Uncontrolled worrying: yes  2. Muscle tension: sometimes  3. Fatigue: down until the afternoon  4. Concentration: sometimes  5. Restlessness/feeling on edge: sometimes in the day, not at night  6. Irritability: sometimes  7. Insomnia: denies  55. Sleeping: yes  56. Eating: well  57. Substances: denies  58. Therapy: deferred  59. Medication compliant: some issues with compliance (on accident)  a. Taking 150 mg of venlafaxine for the last week, rather than 75 mg due to pharmacy error.  60. No SI HI AVH.  a. Sees bugs, but they actually have a bug issue in the apartment. (ladybugs)  b. Meagan does not believe she is hallucinating.      9/7: Virtual visit via Zoom audio and video due to the COVID-19 pandemic.  Patient is accepting of and agreeable to visit.  The visit consisted of the patient and I.  Interview:  61. Records review shows no new developments.  62. \"Everything's pretty good.\"  63. No hallucinations at all. Might see a shadow.  64. Depression better.  65.  doing better, but has dizziness.  66. Meagan in agreement.  67. No SI HI AVH.      7/27: Virtual visit via Zoom audio and video due to the COVID-19 pandemic. Patient is accepting of and agreeable to appointment. The appointment consisted of the patient and I only.   Interview: \"Good\"   1. Having VH less frequently, once or twice a week. Like someone passes me real fast. Not experiencing spiders.  2. Sleeping well.  3.  doing better. Worrying is much better.  4. Medicine has been helpful.  5. Still feels depressed. Homesick for [place].   6. No panic attacks in last two weeks.  7. Much clearer today, linear. Better historian.  8. Meagan agrees.  9. No SI HI " "AVH.    Previous:  Still having panic attacks once a week. Sx: soa, sweating, palpitations, tachycardia, fear, no derealization/depersonalization, last 30 min, leaves room where she was at. No triggers. Fear of another panic attack happening.     Anxiety due to 's health; admitted to hospital recently for dehydration. Notes uncontrolled worrying, muscle tension, irritability, restlessness, trouble sleeping - will wake at 3 am and can't go back to sleep. Duration is at least 6 months, since moved here.    Still having hallucinations every night before she goes to bed. Sees shadows walk past bed. No AH. Sometimes sees spiders during the day on the floor; happens once or twice a week.     Seroquel helped to some extent.     Again, patient is somewhat of a poor historian. Often times she will answer a question with \"no,\" and then contradict her self moments later.    Collateral from Meagan: patient did have a UTI (UA confirmed); was put on a course of abx. Feels her mom's confusion has improved since then.      Past Surgical History:  Past Surgical History:   Procedure Laterality Date   • CATARACT EXTRACTION  2002   • CHOLECYSTECTOMY OPEN  1971       Problem List:  Patient Active Problem List   Diagnosis   • Forgetfulness   • Anxiety   • Depression   • Esophageal reflux   • Hallucinations   • Head injury   • Hypertension   • Other acute sinusitis   • PTSD (post-traumatic stress disorder)   • Mixed hyperlipidemia   • Acquired hypothyroidism   • Vitamin D deficiency   • Neuropathic pain       Allergy:   Allergies   Allergen Reactions   • Floxin Otic [Ofloxacin] Rash        Discontinued Medications:  Medications Discontinued During This Encounter   Medication Reason   • QUEtiapine (SEROquel) 25 MG tablet Reorder       Current Medications:   Current Outpatient Medications   Medication Sig Dispense Refill   • acetaminophen (TYLENOL) 500 MG tablet Take 500 mg by mouth Every 6 (Six) Hours As Needed for Mild Pain.     • " Ascorbic Acid (Vitamin C) 250 MG chewable tablet Chew.     • aspirin 81 MG EC tablet aspirin 81 mg oral tablet,delayed release (DR/EC) take 1 tablet (81 mg) by oral route once daily   Active     • atorvastatin (LIPITOR) 40 MG tablet TAKE 1 TABLET BY MOUTH EVERY DAY 90 tablet 1   • busPIRone (BUSPAR) 10 MG tablet Take 1 tablet by mouth Daily. At 3 pm 90 tablet 1   • gabapentin (NEURONTIN) 300 MG capsule TAKE 1 CAPSULE BY MOUTH TWICE A DAY 60 capsule 2   • levothyroxine (SYNTHROID, LEVOTHROID) 75 MCG tablet TAKE 1 TABLET BY MOUTH EVERY DAY 90 tablet 1   • lisinopril (PRINIVIL,ZESTRIL) 10 MG tablet TAKE 1 TABLET BY MOUTH EVERY DAY 90 tablet 1   • metoprolol tartrate (LOPRESSOR) 25 MG tablet metoprolol tartrate 25 mg oral tablet take 2 tablets (50 mg) by oral route once daily   Active     • multivitamin with minerals tablet tablet Take 1 tablet by mouth Daily.     • omeprazole (priLOSEC) 40 MG capsule Take 1 capsule by mouth Daily. 90 capsule 1   • QUEtiapine (SEROquel) 25 MG tablet 25 mg (1 tab) at 2pm, 50 mg (2 tab) nightly 180 tablet 3   • venlafaxine (EFFEXOR) 37.5 MG tablet Take 112.5 mg by mouth Daily.     • venlafaxine XR (EFFEXOR-XR) 150 MG 24 hr capsule Take 1 capsule by mouth Daily. 90 capsule 1   • vitamin D (ERGOCALCIFEROL) 1.25 MG (64299 UT) capsule capsule TAKE 1 CAPSULE BY MOUTH 1 TIME PER WEEK. 13 capsule 1   • Zinc Sulfate (ZINC 15 PO) Take  by mouth.       No current facility-administered medications for this visit.       Past Medical History:  Past Medical History:   Diagnosis Date   • Acid reflux    • Allergies    • Anxiety    • Depression    • Forgetfulness    • Hallucinations 04/15/2021   • Head injury    • HTN (hypertension)    • PTSD (post-traumatic stress disorder)          Social History     Socioeconomic History   • Marital status: Unknown   Tobacco Use   • Smoking status: Never   • Smokeless tobacco: Never   Vaping Use   • Vaping Use: Never used   Substance and Sexual Activity   • Alcohol  "use: Never   • Drug use: Never   • Sexual activity: Not Currently         Family History   Problem Relation Age of Onset   • Stroke Mother    • Heart disease Mother    • Stroke Father    • Heart disease Father    • Diabetes Father    • Stroke Sister    • Diabetes Sister    • Stroke Brother    • Diabetes Brother    • Diabetes Other         AUNT/UNCLE   • Anxiety disorder Other        Mental Status Exam:   Hygiene:   good, at home  Cooperation:  Cooperative  Eye Contact:  Good  Psychomotor Behavior:  Appropriate  Affect:  Appropriate, mood congruent  Mood: \"the buspar doesn't work\"  Speech:  Normal  Thought Process:  Goal directed  Thought Content:  Normal and Mood congruent  Suicidal:  None  Homicidal:  None  Hallucinations:  None  Delusion:  None  Memory:  Deficits  Orientation:  Grossly intact  Reliability:  fair  Insight:  Fair  Judgement:  Fair  Impulse Control:  Fair  Physical/Medical Issues:  No      Review of Systems:  Review of Systems   Constitutional: Positive for fatigue. Negative for diaphoresis.   HENT: Negative for drooling.    Eyes: Positive for visual disturbance.   Respiratory: Negative for cough and shortness of breath.    Cardiovascular: Negative for chest pain, palpitations and leg swelling.   Gastrointestinal: Negative for diarrhea, nausea and vomiting.   Endocrine: Positive for cold intolerance. Negative for heat intolerance.   Genitourinary: Negative for difficulty urinating.   Musculoskeletal: Negative for joint swelling.   Allergic/Immunologic: Negative for immunocompromised state.   Neurological: Negative for dizziness, seizures, syncope, speech difficulty, light-headedness, numbness and headaches.   Hematological: Negative for adenopathy.   Psychiatric/Behavioral: Negative for sleep disturbance.         Physical Exam:  Physical Exam    Vital Signs:   There were no vitals taken for this visit.     Lab Results:   No visits with results within 6 Month(s) from this visit.   Latest known visit " with results is:   Office Visit on 03/14/2022   Component Date Value Ref Range Status   • Glucose 03/14/2022 94  65 - 99 mg/dL Final   • BUN 03/14/2022 15  8 - 23 mg/dL Final   • Creatinine 03/14/2022 1.09 (H)  0.57 - 1.00 mg/dL Final   • Sodium 03/14/2022 142  136 - 145 mmol/L Final   • Potassium 03/14/2022 3.8  3.5 - 5.2 mmol/L Final   • Chloride 03/14/2022 102  98 - 107 mmol/L Final   • CO2 03/14/2022 28.0  22.0 - 29.0 mmol/L Final   • Calcium 03/14/2022 9.7  8.6 - 10.5 mg/dL Final   • Total Protein 03/14/2022 7.6  6.0 - 8.5 g/dL Final   • Albumin 03/14/2022 4.10  3.50 - 5.20 g/dL Final   • ALT (SGPT) 03/14/2022 13  1 - 33 U/L Final   • AST (SGOT) 03/14/2022 15  1 - 32 U/L Final   • Alkaline Phosphatase 03/14/2022 125 (H)  39 - 117 U/L Final   • Total Bilirubin 03/14/2022 0.2  0.0 - 1.2 mg/dL Final   • Globulin 03/14/2022 3.5  gm/dL Final   • A/G Ratio 03/14/2022 1.2  g/dL Final   • BUN/Creatinine Ratio 03/14/2022 13.8  7.0 - 25.0 Final   • Anion Gap 03/14/2022 12.0  5.0 - 15.0 mmol/L Final   • eGFR 03/14/2022 53.8 (L)  >60.0 mL/min/1.73 Final    National Kidney Foundation and American Society of Nephrology (ASN) Task Force recommended calculation based on the Chronic Kidney Disease Epidemiology Collaboration (CKD-EPI) equation refit without adjustment for race.   • TSH 03/14/2022 1.040  0.270 - 4.200 uIU/mL Final   • Total Cholesterol 03/14/2022 169  0 - 200 mg/dL Final   • Triglycerides 03/14/2022 197 (H)  0 - 150 mg/dL Final   • HDL Cholesterol 03/14/2022 46  40 - 60 mg/dL Final   • LDL Cholesterol  03/14/2022 90  0 - 100 mg/dL Final   • VLDL Cholesterol 03/14/2022 33  5 - 40 mg/dL Final   • LDL/HDL Ratio 03/14/2022 1.82   Final   • 25 Hydroxy, Vitamin D 03/14/2022 44.1  30.0 - 100.0 ng/ml Final   • WBC 03/14/2022 8.03  3.40 - 10.80 10*3/mm3 Final   • RBC 03/14/2022 4.67  3.77 - 5.28 10*6/mm3 Final   • Hemoglobin 03/14/2022 12.6  12.0 - 15.9 g/dL Final   • Hematocrit 03/14/2022 38.7  34.0 - 46.6 % Final   •  MCV 03/14/2022 82.9  79.0 - 97.0 fL Final   • MCH 03/14/2022 27.0  26.6 - 33.0 pg Final   • MCHC 03/14/2022 32.6  31.5 - 35.7 g/dL Final   • RDW 03/14/2022 13.2  12.3 - 15.4 % Final   • RDW-SD 03/14/2022 39.7  37.0 - 54.0 fl Final   • MPV 03/14/2022 11.2  6.0 - 12.0 fL Final   • Platelets 03/14/2022 305  140 - 450 10*3/mm3 Final   • Neutrophil % 03/14/2022 79.9 (H)  42.7 - 76.0 % Final   • Lymphocyte % 03/14/2022 11.2 (L)  19.6 - 45.3 % Final   • Monocyte % 03/14/2022 6.6  5.0 - 12.0 % Final   • Eosinophil % 03/14/2022 1.4  0.3 - 6.2 % Final   • Basophil % 03/14/2022 0.5  0.0 - 1.5 % Final   • Immature Grans % 03/14/2022 0.4  0.0 - 0.5 % Final   • Neutrophils, Absolute 03/14/2022 6.42  1.70 - 7.00 10*3/mm3 Final   • Lymphocytes, Absolute 03/14/2022 0.90  0.70 - 3.10 10*3/mm3 Final   • Monocytes, Absolute 03/14/2022 0.53  0.10 - 0.90 10*3/mm3 Final   • Eosinophils, Absolute 03/14/2022 0.11  0.00 - 0.40 10*3/mm3 Final   • Basophils, Absolute 03/14/2022 0.04  0.00 - 0.20 10*3/mm3 Final   • Immature Grans, Absolute 03/14/2022 0.03  0.00 - 0.05 10*3/mm3 Final   • nRBC 03/14/2022 0.0  0.0 - 0.2 /100 WBC Final   • Amphetamine Screen, Urine 03/14/2022 Negative  Negative Final   • AMP INTERNAL CONTROL 03/14/2022 Passed  Passed Final   • Barbiturates Screen, Urine 03/14/2022 Negative  Negative Final   • BARBITURATE INTERNAL CONTROL 03/14/2022 Passed  Passed Final   • Buprenorphine, Screen, Urine 03/14/2022 Negative  Negative Final   • BUPRENORPHINE INTERNAL CONTROL 03/14/2022 Passed  Passed Final   • Benzodiazepine Screen, Urine 03/14/2022 Negative  Negative Final   • BENZODIAZEPINE INTERNAL CONTROL 03/14/2022 Passed  Passed Final   • Cocaine Screen, Urine 03/14/2022 Negative  Negative Final   • COCAINE INTERNAL CONTROL 03/14/2022 Passed  Passed Final   • MDMA (ECSTASY) 03/14/2022 Negative  Negative Final   • MDMA (ECSTASY) INTERNAL CONTROL 03/14/2022 Passed  Passed Final   • Methamphetamine, Ur 03/14/2022 Negative  Negative  Final   • METHAMPHETAMINE INTERNAL CONTROL 03/14/2022 Passed  Passed Final   • Methadone Screen, Urine 03/14/2022 Negative  Negative Final   • METHADONE INTERNAL CONTROL 03/14/2022 Passed  Passed Final   • Opiate Screen 03/14/2022 Negative  Negative Final   • OPIATES INTERNAL CONTROL 03/14/2022 Passed  Passed Final   • Oxycodone Screen, Urine 03/14/2022 Negative  Negative Final   • OXYCODONE INTERNAL CONTROL 03/14/2022 Passed  Passed Final   • Phencyclidine (PCP), Urine 03/14/2022 Negative  Negative Final   • PHENCYCLIDINE INTERNAL CONTROL 03/14/2022 Passed  Passed Final   • THC, Screen, Urine 03/14/2022 Negative  Negative Final   • THC INTERNAL CONTROL 03/14/2022 Passed  Passed Final   • Lot Number 03/14/2022 [id number]   Final   • Expiration Date 03/14/2022 3/31/23   Final       EKG Results:  No orders to display       Imaging Results:  No Images in the past 120 days found..      Assessment & Plan   Diagnoses and all orders for this visit:    1. Generalized anxiety disorder (Primary)    2. Major depressive disorder, recurrent episode, moderate (HCC)    3. Hallucinations  -     QUEtiapine (SEROquel) 25 MG tablet; 25 mg (1 tab) at 2pm, 50 mg (2 tab) nightly  Dispense: 180 tablet; Refill: 3    4. Neuropathic pain    5. Forgetfulness      Presentation most consistent with developing dementia, likely Lewy body dementia.  Patient also reports worsening gait abnormalities; she fell in the summer of last year, injuring her head, and requiring stitches.  Patient also has a history of depression, possible PTSD by review of chart.  Patient did not mention PTSD during the interview, but she is not a very good historian.  Often times she will say no to a question, and then contradict herself only a few moments later.  She has some trouble with knowing her medications or her medical conditions.    11/30: Buspar not helpful. Stop. Still anxious and depressed at 3 - 7 pm, strangely. Target with a dose of seroquel.  Can also try  increasing frequency of gabapentin, low-dose trazodone.  16 minutes of supportive psychotherapy with goal to strengthen defenses, promote problems solving, restore adaptive functioning and provide symptom relief. The therapeutic alliance was strengthened to encourage the patient to express their thoughts and feelings. Esteem building was enhanced through praise, reassurance, normalizing and encouragement. Coping skills were enhanced to build distress tolerance skills and emotional regulation. Allowed patient to freely discuss issues without interruption or judgement with unconditional positive regard, active listening skills, and empathy. Provided a safe, confidential environment to facilitate the development of a positive therapeutic relationship and encourage open, honest communication. Assisted patient in identifying risk factors which would indicate the need for higher level of care including thoughts to harm self or others and/or self-harming behavior and encouraged patient to contact this office, call 911, or present to the nearest emergency room should any of these events occur. Assisted patient in processing session content; acknowledged and normalized patient’s thoughts, feelings, and concerns by utilizing a person-centered approach in efforts to build appropriate rapport and a positive therapeutic relationship with open and honest communication. Patient given education on medication side effects, diagnosis/illness and relapse symptoms. Plan to continue supportive psychotherapy in next appointment to provide symptom relief.  Diagnoses: as above  Symptoms: as above  Functional status: fair  Mental Status Exam: as above    Treatment plan: Medication management and supportive psychotherapy  Prognosis: good  Progress: residual dep and anx  2 mos      8/31: Pt's anxiety comes from taking care of her father, per Meagan. He's dealing with depression. Meagan is stressed. Discussed getting more help to take care of  pt and her father. Pt is better. No changes to meds. 20 minutes of supportive psychotherapy  Treatment plan: Medication management and supportive psychotherapy  Prognosis: good  Progress: improving  3 mos      7/19: Increase effexor to target residual anx and dep.  Consider increasing BuSpar soon at next appointment.  17 minutes of supportive psychotherapy Treatment plan: Medication management and supportive psychotherapy  Prognosis: Good  Progress: Worsened anxiety and depression  6 weeks    4/19: Patient is well and right where she should be.  7 minutes of supportive psychotherapy3 months    3/8: Target the feeling of anxiety at 4 PM by starting BuSpar at 3 PM.  Consider switching to gabapentin if BuSpar does not help.  Would start at a low dose of 100 mg.  16 minutes of supportive psychotherapy5 weeks    11/8: Depressed in the mornings. Light box, increase venlafaxine. 17 minutes of supportive psychotherapy  8 wks      9/7: Doing even better.  8 weeks.  No changes.    7/27: Markedly improved, and far more linear.  Still has residual anxiety and depression.  Hallucinations are still present but they do not cause distress at all to the patient.  Continue Seroquel at the present dose.  Double the dose of venlafaxine. See back in 6 weeks.  This was also discussed with Meagan, patient's daughter.    Previous:  Caution again advised in terms of increasing the dose given the likely sensitivity she has to antipsychotics.  Psychotherapy is deferred at this time.  S/p referral to neurologist is appropriate for further management.     Visit Diagnoses:    ICD-10-CM ICD-9-CM   1. Generalized anxiety disorder  F41.1 300.02   2. Major depressive disorder, recurrent episode, moderate (HCC)  F33.1 296.32   3. Hallucinations  R44.3 780.1   4. Neuropathic pain  M79.2 729.2   5. Forgetfulness  R68.89 780.99       PLAN:  68. Risk Assessment: Risk of self-harm acutely is low. Risk factors include anxiety disorder, depressive disorder,  access to weapons, recent psychosocial stressors (pandemic). Protective factors include no family history, no present SI, no history of suicide attempts or self-harm in the past, no access to weapons, no AODA, healthcare seeking, future orientation, willingness to engage in care. Risk of self-harm chronically is also low, but could be further elevated in the event of treatment noncompliance and/or AODA.  69. Safety: No acute safety concerns.  70. Medications:   a. INCREASE quetiapine 50 mg nightly by adding 25 mg q2pm. Risks, benefits, alternatives discussed with patient including nausea and vomiting, GI upset, sedation, akathisia, hypotension, increased appetite, lowering of seizure threshold, theoretical risk of tardive dyskinesia, extrapyramidal symptoms, restless legs syndrome. After discussion of these risks and benefits, the patient voiced understanding and agreed to proceed.  b. STATUS POST   i. sertraline 100 mg daily.  Not effective.  c. CONTINUE venlafaxine 150 mg PO QDAY. Risks, benefits, alternatives discussed with patient including GI upset, nausea vomiting diarrhea, theoretical decrease of seizure threshold predisposing the patient to a slightly higher seizure risk, headaches, sexual dysfunction, serotonin syndrome, bleeding risk, increased suicidality in patients 24 years and younger.  After discussion of these risks and benefits, the patient voiced understanding and agreed to proceed.  d. STOP BuSpar 10 mg at 3 PM daily. Ineffective.  e. CONTINUE with gabapentin 300 twice daily. Risks, benefits, alternatives discussed with patient including sedation, dizziness/falls risk, GI upset, weight gain.  After discussion of these risks and benefits, the patient voiced understanding and agreed to proceed. UDS ordered, Mehdi reviewed.  71. Therapy: Deferred.  72. Other: s/p referral to neurology for workup of likely dementia.  73. Follow Up: 2 mos      TREATMENT PLAN/GOALS: Continue supportive psychotherapy  efforts and medications as indicated. Treatment and medication options discussed during today's visit. Patient ackowledged and verbally consented to continue with current treatment plan and was educated on the importance of compliance with treatment and follow-up appointments.    MEDICATION ISSUES:  ERNESTINA reviewed as expected.  Discussed medication options and treatment plan of prescribed medication as well as the risks, benefits, and side effects including potential falls, possible impaired driving and metabolic adversities among others. Patient is agreeable to call the office with any worsening of symptoms or onset of side effects. Patient is agreeable to call 911 or go to the nearest ER should he/she begin having SI/HI. No medication side effects or related complaints today.     MEDS ORDERED DURING VISIT:  New Medications Ordered This Visit   Medications   • QUEtiapine (SEROquel) 25 MG tablet     Si mg (1 tab) at 2pm, 50 mg (2 tab) nightly     Dispense:  180 tablet     Refill:  3     New script       Return in about 2 months (around 2023).         This document has been electronically signed by Riki Peterson MD  2022 11:54 EST      Part of this note may be an electronic transcription/translation of spoken language to printed text using the Dragon Dictation System.

## 2023-01-31 ENCOUNTER — TELEMEDICINE (OUTPATIENT)
Dept: PSYCHIATRY | Facility: CLINIC | Age: 73
End: 2023-01-31
Payer: MEDICARE

## 2023-01-31 DIAGNOSIS — R68.89 FORGETFULNESS: ICD-10-CM

## 2023-01-31 DIAGNOSIS — M79.2 NEUROPATHIC PAIN: ICD-10-CM

## 2023-01-31 DIAGNOSIS — F41.1 GENERALIZED ANXIETY DISORDER: Primary | ICD-10-CM

## 2023-01-31 DIAGNOSIS — R44.3 HALLUCINATIONS: ICD-10-CM

## 2023-01-31 DIAGNOSIS — F33.1 MAJOR DEPRESSIVE DISORDER, RECURRENT EPISODE, MODERATE: ICD-10-CM

## 2023-01-31 PROCEDURE — 99214 OFFICE O/P EST MOD 30 MIN: CPT | Performed by: STUDENT IN AN ORGANIZED HEALTH CARE EDUCATION/TRAINING PROGRAM

## 2023-01-31 PROCEDURE — 90833 PSYTX W PT W E/M 30 MIN: CPT | Performed by: STUDENT IN AN ORGANIZED HEALTH CARE EDUCATION/TRAINING PROGRAM

## 2023-01-31 RX ORDER — VENLAFAXINE HYDROCHLORIDE 150 MG/1
150 CAPSULE, EXTENDED RELEASE ORAL DAILY
Qty: 90 CAPSULE | Refills: 1 | Status: SHIPPED | OUTPATIENT
Start: 2023-01-31

## 2023-01-31 RX ORDER — GABAPENTIN 300 MG/1
300 CAPSULE ORAL 2 TIMES DAILY
Qty: 60 CAPSULE | Refills: 5 | Status: SHIPPED | OUTPATIENT
Start: 2023-02-16

## 2023-01-31 NOTE — PATIENT INSTRUCTIONS
1.  Please return to clinic at your next scheduled visit.  Contact the clinic ([phone]) at least 24 hours prior in the event you need to cancel.  2.  Do no harm to yourself or others.    3.  Avoid alcohol and drugs.    4.  Take all medications as prescribed.  Please contact the clinic with any concerns. If you are in need of medication refills, please call the clinic at [phone].    5. Should you want to get in touch with your provider, Dr. Riki Peterson, please utilize [email or web address] or contact the office ([phone]), and staff will be able to page Dr. Peterson directly.  6.  In the event you have personal crisis, contact the following crisis numbers: Suicide Prevention Hotline 1-257.361.1926; [facility] Helpline 0-828-111-TDDG; [facility] Emergency Room [phone]; text HELLO to 498856; or 776.     SPECIFIC RECOMMENDATIONS:     1.      Medications discussed at this encounter:                   - no changes     2.      Psychotherapy recommendations:

## 2023-01-31 NOTE — PROGRESS NOTES
"Subjective   Payal Singer is a 72 y.o. female who presents today for follow up    Chief Complaint:  mdd meagan    History of Present Illness:     Chart review 4/15: Seen 3/30 to establish care. Hx of anx/dep avh in [place]. on both sertraline and venlafaxine, was on Seroquel. Hx of HTN, GERD. On gabapentin 600 tid, sertraline 200 d, venla 150 d, quetiap 25 qhs. March 2021 labs: cbc shows low mch, high rdw; Cr 1.15 high, abnormal lipids, TSH and fT4 wnl. No OP head imaging, ekg.     \"Kirsty\"    1/31: Virtual visit via Zoom audio and video due to the COVID-19 pandemic.  Patient is accepting of and agreeable to visit.  The visit consisted of the patient and I. The patient is at home, and I am at the office.  Interview:  1. Chart review: No new.  2. Planning: Buspar not helpful. Stop. Still anxious and depressed at 3 - 7 pm, strangely. [employer] with a dose of seroquel.  Can also try increasing frequency of gabapentin, or low-dose trazodone.   3. \"Been going fairly good.\"  a. It has helped (seroquel at 2 pm).   b. Still has anxiety/dep b/w 3-7 pm. \"Have had it since I was in [employer].\"  i. Not really sure why.  ii. 6th grade, would have crying spells and would have to come home from school.  iii. We have problems at the house, .  iv. Really due to  getting upset around that time.  1. He refuses to take his meds  v. Situation cannot be avoided  vi. He has been confronted by Meagan, but it has not worked  4. Mood/Depression: stable  5. Anxiety: not as upset by problems as before  6. Panic attacks: n  7. Energy: stable  8. Concentration: stable  9. Sleeping: well  10. Eating: stable weight  11. Refills: y  12. Substances: n  13. Therapy: n  14. Medication compliant: y  15. No SI HI AVH.      11/30: Virtual visit via Zoom audio and video due to the COVID-19 pandemic.  Patient is accepting of and agreeable to visit.  The visit consisted of the patient and I. The patient is at home, and I am at the " "office.  Interview:  16. Chart review: No new.  17. Planning: No changes at last visit.  Check-in with Meagan regarding how well she is doing, consider referring to psychotherapy.  18. \"Doing good.\"  a.  has his days; kind unless he misses his meds. Anger issues.  19. Mood/Depression:  a. 3 pm: gets depressed, lasts till 7 pm.  20. Anxiety:  a. Around 3 pm I get agitated and upset. No trigger. On edge, restless, also lasts till 7 pm  21. Panic attacks: n  22. Energy: fair  23. Concentration: fair  24. Sleeping: well  25. Eating: stable  26. Refills: n  27. Substances: n  28. Therapy: n  29. Medication compliant: y  30. No SI HI AVH.      8/31: Virtual visit via Zoom audio and video due to the COVID-19 pandemic.  Patient is accepting of and agreeable to visit.  The visit consisted of the patient and I. The patient is at home, and I am at the office.  Interview:  31. Chart review: No new.  32. Planning: Increased Effexor at last visit.  Targeting anxiety.  33. \"I'm good.\"  a. Better control of the anxiety.  i. Situational: both taking care of her   ii. Anxiety improved on the higher dose of effexor. 5/10.  iii. Agrees she is in a good place.  iv. Had crying spells when ran out of effexor  b. Per Meagan, most of it comes from her dad's depression and irritability. He gets verbal.  i. Trying to get him to see a psychiatrist  ii. Situational  iii. We discussed behavioral options  1. Worse at night  2. Triggers -- unexpected, can't anticipate  3. Dealing with vertigo  4. Family meeting -- agrees to trying that  5. Meagan tearful during visit  34. Mood/Depression: 5/10, a little better, but still some depressed mood  35. Panic attacks: n  36. Sleeping: well  37. Eating: stable  38. Refills: y  39. Substances: n  40. Therapy: n  41. Medication compliant: y  42. No SI HI AVH.      7/19: Virtual visit via Zoom audio and video due to the COVID-19 pandemic.  Patient is accepting of and agreeable to visit.  The " "visit consisted of the patient and I. The patient is at home, and I am at the office.  Interview:  43. Chart review: No new.  44. Planning: No changes made at last visit 3 months ago, patient was where she needed to be.  45. \"Doing well.\"  a. Dog is sick, so \"I'm a little shaky.\"  b. Fearful that whatever the dog has is contagious.  c. Sleeping well.  d. Buspar not working as well like it did.  46. Mood/Depression: 6/10, still has depressed mood, guilt, poor energy and concentration.  47. Anxiety: 8/10, due to situation (5 teenagers),  is 78 yo, dizzy all the time  a. Worrying, on edge, \"shaky,\" restless, muscle tension  48. Panic attacks: no  49. Sleeping: well  50. Eating: stable  51. Refills: y   52. Substances: n  53. Therapy: n  54. Medication compliant: y  55. No SI HI AVH.      4/19: Virtual visit via Zoom audio and video due to the COVID-19 pandemic.  Patient is accepting of and agreeable to visit.  The visit consisted of the patient and I. The patient is at home, and I am at the office.  Interview:  56. Chart review: Patient has now started gabapentin 300 mg twice daily.  This had previously been prescribed by dermatology for chronic scalp pain following biopsies.  She was seen by primary care March 14 and was doing well from a mental health standpoint.  CMP in March show slightly elevated creatinine 1.09, alk phos 125, otherwise reassuring CMP, thyroid studies, vitamin D, CBC.  Triglycerides are elevated.  57. \"Things are going good.\"  a. Gabapentin helps with anxiety. Morning and evening.  b. Buspar helps as well.  c. Sleeping well.   d. \"I believe I am where I need to be.\"  58. Refills: y  59. Therapy: n  60. Medication compliant: y  61. No SI HI AVH.      3/8: Virtual visit via Zoom audio and video due to the COVID-19 pandemic.  Patient is accepting of and agreeable to visit.  The visit consisted of the patient and I. The patient is at home, and I am at the office.  Interview:  62. Chart review: " "No new developments  63. \" I get depressed at 4:00 every day.\"  a. Lots of activity at this time.  Kids getting food, having to take care of the animals.  ismael DillMeagan believes patient gets overwhelmed during this time.  After she mention this, the patient agreed.  c. Has never tried BuSpar.  Has been on gabapentin in the past, is not presently on it.  Was on it for neuropathic pain.  d. Otherwise depression and anxiety are extremely well controlled.  Increasing Effexor was helpful for the patient's anxiety.  e. Generally sleeping well.  64. Medication compliant: Yes  65. No SI HI AVH.      11/8: Virtual visit via Zoom audio and video due to the COVID-19 pandemic.  Patient is accepting of and agreeable to visit.  The visit consisted of the patient and I.  Interview:  66. Chart review: Seen by primary care October 8.  Hypertension well controlled.  Labs demonstrate elevated CO2 of 29.4 on CMP, creatinine is 1.00 normal, thyroid studies are normal, elevated triglycerides, normal CBC.  Patient underwent an MRI for hallucinations and it shows no acute/nothing.  67. \"Pretty good.\"  68. Depression/Mood: \"depressed in the afternoon.\"  a. Lifts after she takes her medication.  b. Seasonal component  c. [facility] and its weather, and her family.  1. Anhedonia: denies  2. Guilt or hopelessness: denies  3. Energy: down until the afternoon  4. Concentration: sometimes  5. Weight loss or weight gain: lost 20 lbs over a year  6. Psychomotor retardation or agitation: possible retardation  7. Insomnia: denies  69. Anxiety:  1. Uncontrolled worrying: yes  2. Muscle tension: sometimes  3. Fatigue: down until the afternoon  4. Concentration: sometimes  5. Restlessness/feeling on edge: sometimes in the day, not at night  6. Irritability: sometimes  7. Insomnia: denies  70. Sleeping: yes  71. Eating: well  72. Substances: denies  73. Therapy: deferred  74. Medication compliant: some issues with compliance (on accident)  a. Taking 150 mg " "of venlafaxine for the last week, rather than 75 mg due to pharmacy error.  75. No SI HI AVH.  a. Sees bugs, but they actually have a bug issue in the apartment. (ladybugs)  b. Meagan does not believe she is hallucinating.      9/7: Virtual visit via Zoom audio and video due to the COVID-19 pandemic.  Patient is accepting of and agreeable to visit.  The visit consisted of the patient and I.  Interview:  76. Records review shows no new developments.  77. \"Everything's pretty good.\"  78. No hallucinations at all. Might see a shadow.  79. Depression better.  80.  doing better, but has dizziness.  81. Meagan in agreement.  82. No SI HI AVH.      7/27: Virtual visit via Zoom audio and video due to the COVID-19 pandemic. Patient is accepting of and agreeable to appointment. The appointment consisted of the patient and I only.   Interview: \"Good\"   1. Having VH less frequently, once or twice a week. Like someone passes me real fast. Not experiencing spiders.  2. Sleeping well.  3.  doing better. Worrying is much better.  4. Medicine has been helpful.  5. Still feels depressed. Homesick for [place].   6. No panic attacks in last two weeks.  7. Much clearer today, linear. Better historian.  8. Meagan agrees.  9. No SI HI AVH.    Previous:  Still having panic attacks once a week. Sx: soa, sweating, palpitations, tachycardia, fear, no derealization/depersonalization, last 30 min, leaves room where she was at. No triggers. Fear of another panic attack happening.     Anxiety due to 's health; admitted to hospital recently for dehydration. Notes uncontrolled worrying, muscle tension, irritability, restlessness, trouble sleeping - will wake at 3 am and can't go back to sleep. Duration is at least 6 months, since moved here.    Still having hallucinations every night before she goes to bed. Sees shadows walk past bed. No AH. Sometimes sees spiders during the day on the floor; happens once or twice a week. " "    Seroquel helped to some extent.     Again, patient is somewhat of a poor historian. Often times she will answer a question with \"no,\" and then contradict her self moments later.    Collateral from Meagan: patient did have a UTI (UA confirmed); was put on a course of abx. Feels her mom's confusion has improved since then.      Past Surgical History:  Past Surgical History:   Procedure Laterality Date   • CATARACT EXTRACTION  2002   • CHOLECYSTECTOMY OPEN  1971       Problem List:  Patient Active Problem List   Diagnosis   • Forgetfulness   • Anxiety   • Depression   • Esophageal reflux   • Hallucinations   • Head injury   • Hypertension   • Other acute sinusitis   • PTSD (post-traumatic stress disorder)   • Mixed hyperlipidemia   • Acquired hypothyroidism   • Vitamin D deficiency   • Neuropathic pain       Allergy:   Allergies   Allergen Reactions   • Floxin Otic [Ofloxacin] Rash        Discontinued Medications:  Medications Discontinued During This Encounter   Medication Reason   • venlafaxine (EFFEXOR) 37.5 MG tablet Duplicate order   • busPIRone (BUSPAR) 10 MG tablet Not Efficacious   • venlafaxine XR (EFFEXOR-XR) 150 MG 24 hr capsule Reorder   • gabapentin (NEURONTIN) 300 MG capsule Reorder       Current Medications:   Current Outpatient Medications   Medication Sig Dispense Refill   • acetaminophen (TYLENOL) 500 MG tablet Take 500 mg by mouth Every 6 (Six) Hours As Needed for Mild Pain.     • Ascorbic Acid (Vitamin C) 250 MG chewable tablet Chew.     • aspirin 81 MG EC tablet aspirin 81 mg oral tablet,delayed release (DR/EC) take 1 tablet (81 mg) by oral route once daily   Active     • atorvastatin (LIPITOR) 40 MG tablet TAKE 1 TABLET BY MOUTH EVERY DAY 90 tablet 1   • [START ON 2/16/2023] gabapentin (NEURONTIN) 300 MG capsule Take 1 capsule by mouth 2 (Two) Times a Day. 60 capsule 5   • levothyroxine (SYNTHROID, LEVOTHROID) 75 MCG tablet TAKE 1 TABLET BY MOUTH EVERY DAY 90 tablet 1   • lisinopril " "(PRINIVIL,ZESTRIL) 10 MG tablet TAKE 1 TABLET BY MOUTH EVERY DAY 90 tablet 1   • metoprolol tartrate (LOPRESSOR) 25 MG tablet metoprolol tartrate 25 mg oral tablet take 2 tablets (50 mg) by oral route once daily   Active     • multivitamin with minerals tablet tablet Take 1 tablet by mouth Daily.     • omeprazole (priLOSEC) 40 MG capsule Take 1 capsule by mouth Daily. 90 capsule 1   • QUEtiapine (SEROquel) 25 MG tablet 25 mg (1 tab) at 2pm, 50 mg (2 tab) nightly 180 tablet 3   • venlafaxine XR (EFFEXOR-XR) 150 MG 24 hr capsule Take 1 capsule by mouth Daily. 90 capsule 1   • vitamin D (ERGOCALCIFEROL) 1.25 MG (99074 UT) capsule capsule TAKE 1 CAPSULE BY MOUTH 1 TIME PER WEEK. 13 capsule 1   • Zinc Sulfate (ZINC 15 PO) Take  by mouth.       No current facility-administered medications for this visit.       Past Medical History:  Past Medical History:   Diagnosis Date   • Acid reflux    • Allergies    • Anxiety    • Depression    • Forgetfulness    • Hallucinations 04/15/2021   • Head injury    • HTN (hypertension)    • PTSD (post-traumatic stress disorder)          Social History     Socioeconomic History   • Marital status: Unknown   Tobacco Use   • Smoking status: Never   • Smokeless tobacco: Never   Vaping Use   • Vaping Use: Never used   Substance and Sexual Activity   • Alcohol use: Never   • Drug use: Never   • Sexual activity: Not Currently         Family History   Problem Relation Age of Onset   • Stroke Mother    • Heart disease Mother    • Stroke Father    • Heart disease Father    • Diabetes Father    • Stroke Sister    • Diabetes Sister    • Stroke Brother    • Diabetes Brother    • Diabetes Other         AUNT/UNCLE   • Anxiety disorder Other        Mental Status Exam:   Hygiene:   good, at home  Cooperation:  Cooperative  Eye Contact:  Good  Psychomotor Behavior:  Appropriate  Affect:  Appropriate, mood congruent  Mood: \"I'm better\"  Speech:  Normal  Thought Process:  Goal directed  Thought Content:  " Normal and Mood congruent  Suicidal:  None  Homicidal:  None  Hallucinations:  None  Delusion:  None  Memory:  Deficits  Orientation:  Grossly intact  Reliability:  fair  Insight:  Fair  Judgement:  Fair  Impulse Control:  Fair  Physical/Medical Issues:  No      Review of Systems:  Review of Systems   Constitutional: Positive for fatigue. Negative for diaphoresis.   HENT: Negative for drooling.    Eyes: Positive for visual disturbance.   Respiratory: Positive for cough. Negative for shortness of breath.    Cardiovascular: Negative for chest pain, palpitations and leg swelling.   Gastrointestinal: Negative for diarrhea, nausea and vomiting.   Endocrine: Negative for cold intolerance and heat intolerance.   Genitourinary: Negative for difficulty urinating.   Musculoskeletal: Negative for joint swelling.   Allergic/Immunologic: Negative for immunocompromised state.   Neurological: Positive for light-headedness. Negative for dizziness, seizures, syncope, speech difficulty, numbness and headaches.   Hematological: Negative for adenopathy.   Psychiatric/Behavioral: Negative for sleep disturbance.         Physical Exam:  Physical Exam    Vital Signs:   There were no vitals taken for this visit.     Lab Results:   No visits with results within 6 Month(s) from this visit.   Latest known visit with results is:   Office Visit on 03/14/2022   Component Date Value Ref Range Status   • Glucose 03/14/2022 94  65 - 99 mg/dL Final   • BUN 03/14/2022 15  8 - 23 mg/dL Final   • Creatinine 03/14/2022 1.09 (H)  0.57 - 1.00 mg/dL Final   • Sodium 03/14/2022 142  136 - 145 mmol/L Final   • Potassium 03/14/2022 3.8  3.5 - 5.2 mmol/L Final   • Chloride 03/14/2022 102  98 - 107 mmol/L Final   • CO2 03/14/2022 28.0  22.0 - 29.0 mmol/L Final   • Calcium 03/14/2022 9.7  8.6 - 10.5 mg/dL Final   • Total Protein 03/14/2022 7.6  6.0 - 8.5 g/dL Final   • Albumin 03/14/2022 4.10  3.50 - 5.20 g/dL Final   • ALT (SGPT) 03/14/2022 13  1 - 33 U/L Final    • AST (SGOT) 03/14/2022 15  1 - 32 U/L Final   • Alkaline Phosphatase 03/14/2022 125 (H)  39 - 117 U/L Final   • Total Bilirubin 03/14/2022 0.2  0.0 - 1.2 mg/dL Final   • Globulin 03/14/2022 3.5  gm/dL Final   • A/G Ratio 03/14/2022 1.2  g/dL Final   • BUN/Creatinine Ratio 03/14/2022 13.8  7.0 - 25.0 Final   • Anion Gap 03/14/2022 12.0  5.0 - 15.0 mmol/L Final   • eGFR 03/14/2022 53.8 (L)  >60.0 mL/min/1.73 Final    National Kidney Foundation and American Society of Nephrology (ASN) Task Force recommended calculation based on the Chronic Kidney Disease Epidemiology Collaboration (CKD-EPI) equation refit without adjustment for race.   • TSH 03/14/2022 1.040  0.270 - 4.200 uIU/mL Final   • Total Cholesterol 03/14/2022 169  0 - 200 mg/dL Final   • Triglycerides 03/14/2022 197 (H)  0 - 150 mg/dL Final   • HDL Cholesterol 03/14/2022 46  40 - 60 mg/dL Final   • LDL Cholesterol  03/14/2022 90  0 - 100 mg/dL Final   • VLDL Cholesterol 03/14/2022 33  5 - 40 mg/dL Final   • LDL/HDL Ratio 03/14/2022 1.82   Final   • 25 Hydroxy, Vitamin D 03/14/2022 44.1  30.0 - 100.0 ng/ml Final   • WBC 03/14/2022 8.03  3.40 - 10.80 10*3/mm3 Final   • RBC 03/14/2022 4.67  3.77 - 5.28 10*6/mm3 Final   • Hemoglobin 03/14/2022 12.6  12.0 - 15.9 g/dL Final   • Hematocrit 03/14/2022 38.7  34.0 - 46.6 % Final   • MCV 03/14/2022 82.9  79.0 - 97.0 fL Final   • MCH 03/14/2022 27.0  26.6 - 33.0 pg Final   • MCHC 03/14/2022 32.6  31.5 - 35.7 g/dL Final   • RDW 03/14/2022 13.2  12.3 - 15.4 % Final   • RDW-SD 03/14/2022 39.7  37.0 - 54.0 fl Final   • MPV 03/14/2022 11.2  6.0 - 12.0 fL Final   • Platelets 03/14/2022 305  140 - 450 10*3/mm3 Final   • Neutrophil % 03/14/2022 79.9 (H)  42.7 - 76.0 % Final   • Lymphocyte % 03/14/2022 11.2 (L)  19.6 - 45.3 % Final   • Monocyte % 03/14/2022 6.6  5.0 - 12.0 % Final   • Eosinophil % 03/14/2022 1.4  0.3 - 6.2 % Final   • Basophil % 03/14/2022 0.5  0.0 - 1.5 % Final   • Immature Grans % 03/14/2022 0.4  0.0 - 0.5 %  Final   • Neutrophils, Absolute 03/14/2022 6.42  1.70 - 7.00 10*3/mm3 Final   • Lymphocytes, Absolute 03/14/2022 0.90  0.70 - 3.10 10*3/mm3 Final   • Monocytes, Absolute 03/14/2022 0.53  0.10 - 0.90 10*3/mm3 Final   • Eosinophils, Absolute 03/14/2022 0.11  0.00 - 0.40 10*3/mm3 Final   • Basophils, Absolute 03/14/2022 0.04  0.00 - 0.20 10*3/mm3 Final   • Immature Grans, Absolute 03/14/2022 0.03  0.00 - 0.05 10*3/mm3 Final   • nRBC 03/14/2022 0.0  0.0 - 0.2 /100 WBC Final   • Amphetamine Screen, Urine 03/14/2022 Negative  Negative Final   • AMP INTERNAL CONTROL 03/14/2022 Passed  Passed Final   • Barbiturates Screen, Urine 03/14/2022 Negative  Negative Final   • BARBITURATE INTERNAL CONTROL 03/14/2022 Passed  Passed Final   • Buprenorphine, Screen, Urine 03/14/2022 Negative  Negative Final   • BUPRENORPHINE INTERNAL CONTROL 03/14/2022 Passed  Passed Final   • Benzodiazepine Screen, Urine 03/14/2022 Negative  Negative Final   • BENZODIAZEPINE INTERNAL CONTROL 03/14/2022 Passed  Passed Final   • Cocaine Screen, Urine 03/14/2022 Negative  Negative Final   • COCAINE INTERNAL CONTROL 03/14/2022 Passed  Passed Final   • MDMA (ECSTASY) 03/14/2022 Negative  Negative Final   • MDMA (ECSTASY) INTERNAL CONTROL 03/14/2022 Passed  Passed Final   • Methamphetamine, Ur 03/14/2022 Negative  Negative Final   • METHAMPHETAMINE INTERNAL CONTROL 03/14/2022 Passed  Passed Final   • Methadone Screen, Urine 03/14/2022 Negative  Negative Final   • METHADONE INTERNAL CONTROL 03/14/2022 Passed  Passed Final   • Opiate Screen 03/14/2022 Negative  Negative Final   • OPIATES INTERNAL CONTROL 03/14/2022 Passed  Passed Final   • Oxycodone Screen, Urine 03/14/2022 Negative  Negative Final   • OXYCODONE INTERNAL CONTROL 03/14/2022 Passed  Passed Final   • Phencyclidine (PCP), Urine 03/14/2022 Negative  Negative Final   • PHENCYCLIDINE INTERNAL CONTROL 03/14/2022 Passed  Passed Final   • THC, Screen, Urine 03/14/2022 Negative  Negative Final   • THC  INTERNAL CONTROL 03/14/2022 Passed  Passed Final   • Lot Number 03/14/2022 [id number]   Final   • Expiration Date 03/14/2022 3/31/23   Final       EKG Results:  No orders to display       Imaging Results:  No Images in the past 120 days found..      Assessment & Plan   Diagnoses and all orders for this visit:    1. Generalized anxiety disorder (Primary)  -     venlafaxine XR (EFFEXOR-XR) 150 MG 24 hr capsule; Take 1 capsule by mouth Daily.  Dispense: 90 capsule; Refill: 1    2. Major depressive disorder, recurrent episode, moderate (HCC)  -     venlafaxine XR (EFFEXOR-XR) 150 MG 24 hr capsule; Take 1 capsule by mouth Daily.  Dispense: 90 capsule; Refill: 1    3. Hallucinations    4. Neuropathic pain  -     gabapentin (NEURONTIN) 300 MG capsule; Take 1 capsule by mouth 2 (Two) Times a Day.  Dispense: 60 capsule; Refill: 5    5. Forgetfulness    6. Neuropathic pain  Comments:  of scalp, chronic. will restart gabapentin--UDS, controlled substance agreement, and Mehdi reviewed today.  Orders:  -     gabapentin (NEURONTIN) 300 MG capsule; Take 1 capsule by mouth 2 (Two) Times a Day.  Dispense: 60 capsule; Refill: 5      Presentation most consistent with developing dementia, likely Lewy body dementia.  Patient also reports worsening gait abnormalities; she fell in the summer of last year, injuring her head, and requiring stitches.  Patient also has a history of depression, possible PTSD by review of chart.  Patient did not mention PTSD during the interview, but she is not a very good historian.  Often times she will say no to a question, and then contradict herself only a few moments later.  She has some trouble with knowing her medications or her medical conditions.    1/31: Seroquel dose in the afternoon helps, but the real problem is a situational stressor that cannot be solved with the medication.  We brainstormed possible ideas regarding how to manage patient's 's behavior.  We discussed having him accompany  patient to her appointments to develop familiarity with me in order for me to potentially take over her medications.  They do not think that will help.  17 minutes of supportive psychotherapy with goal to strengthen defenses, promote problems solving, restore adaptive functioning and provide symptom relief. The therapeutic alliance was strengthened to encourage the patient to express their thoughts and feelings. Esteem building was enhanced through praise, reassurance, normalizing and encouragement. Coping skills were enhanced to build distress tolerance skills and emotional regulation. Allowed patient to freely discuss issues without interruption or judgement with unconditional positive regard, active listening skills, and empathy. Provided a safe, confidential environment to facilitate the development of a positive therapeutic relationship and encourage open, honest communication. Assisted patient in identifying risk factors which would indicate the need for higher level of care including thoughts to harm self or others and/or self-harming behavior and encouraged patient to contact this office, call 911, or present to the nearest emergency room should any of these events occur. Assisted patient in processing session content; acknowledged and normalized patient’s thoughts, feelings, and concerns by utilizing a person-centered approach in efforts to build appropriate rapport and a positive therapeutic relationship with open and honest communication. Patient given education on medication side effects, diagnosis/illness and relapse symptoms. Plan to continue supportive psychotherapy in next appointment to provide symptom relief.  Diagnoses: as above  Symptoms: as above  Functional status: Fair  Mental Status Exam: as above    Treatment plan: Medication management and supportive psychotherapy  Prognosis: Fair to good  Progress: Improved, not a goal  3 months      11/30: Buspar not helpful. Stop. Still anxious and  depressed at 3 - 7 pm, strangely. Target with a dose of seroquel.  Can also try increasing frequency of gabapentin, low-dose trazodone.  16 minutes of supportive psychotherapy  Prognosis: good  Progress: residual dep and anx  2 mos      8/31: Pt's anxiety comes from taking care of her father, per Meagan. He's dealing with depression. Meagan is stressed. Discussed getting more help to take care of pt and her father. Pt is better. No changes to meds. 20 minutes of supportive psychotherapy  Treatment plan: Medication management and supportive psychotherapy  Prognosis: good  Progress: improving  3 mos      7/19: Increase effexor to target residual anx and dep.  Consider increasing BuSpar soon at next appointment.  17 minutes of supportive psychotherapy Treatment plan: Medication management and supportive psychotherapy  Prognosis: Good  Progress: Worsened anxiety and depression  6 weeks    4/19: Patient is well and right where she should be.  7 minutes of supportive psychotherapy3 months    3/8: Target the feeling of anxiety at 4 PM by starting BuSpar at 3 PM.  Consider switching to gabapentin if BuSpar does not help.  Would start at a low dose of 100 mg.  16 minutes of supportive psychotherapy5 weeks    11/8: Depressed in the mornings. Light box, increase venlafaxine. 17 minutes of supportive psychotherapy  8 wks      9/7: Doing even better.  8 weeks.  No changes.    7/27: Markedly improved, and far more linear.  Still has residual anxiety and depression.  Hallucinations are still present but they do not cause distress at all to the patient.  Continue Seroquel at the present dose.  Double the dose of venlafaxine. See back in 6 weeks.  This was also discussed with Meagan, patient's daughter.    Previous:  Caution again advised in terms of increasing the dose given the likely sensitivity she has to antipsychotics.  Psychotherapy is deferred at this time.  S/p referral to neurologist is appropriate for further management.      Visit Diagnoses:    ICD-10-CM ICD-9-CM   1. Generalized anxiety disorder  F41.1 300.02   2. Major depressive disorder, recurrent episode, moderate (HCC)  F33.1 296.32   3. Hallucinations  R44.3 780.1   4. Neuropathic pain  M79.2 729.2   5. Forgetfulness  R68.89 780.99   6. Neuropathic pain  M79.2 729.2       PLAN:  83. Risk Assessment: Risk of self-harm acutely is low. Risk factors include anxiety disorder, depressive disorder, access to weapons, recent psychosocial stressors (pandemic). Protective factors include no family history, no present SI, no history of suicide attempts or self-harm in the past, no access to weapons, no AODA, healthcare seeking, future orientation, willingness to engage in care. Risk of self-harm chronically is also low, but could be further elevated in the event of treatment noncompliance and/or AODA.  84. Safety: No acute safety concerns.  85. Medications:   a. CONTINUE quetiapine 50 mg nightly, 25 mg q2pm. Risks, benefits, alternatives discussed with patient including nausea and vomiting, GI upset, sedation, akathisia, hypotension, increased appetite, lowering of seizure threshold, theoretical risk of tardive dyskinesia, extrapyramidal symptoms, restless legs syndrome. After discussion of these risks and benefits, the patient voiced understanding and agreed to proceed.  b. CONTINUE venlafaxine 150 mg PO QDAY. Risks, benefits, alternatives discussed with patient including GI upset, nausea vomiting diarrhea, theoretical decrease of seizure threshold predisposing the patient to a slightly higher seizure risk, headaches, sexual dysfunction, serotonin syndrome, bleeding risk, increased suicidality in patients 24 years and younger.  After discussion of these risks and benefits, the patient voiced understanding and agreed to proceed.  c. CONTINUE with gabapentin 300 twice daily. Risks, benefits, alternatives discussed with patient including sedation, dizziness/falls risk, GI upset, weight  gain.  After discussion of these risks and benefits, the patient voiced understanding and agreed to proceed. UDS ordered, Ernestina reviewed.  i. S/P:  1. BuSpar 10 mg at 3 PM daily. Ineffective.  2. sertraline 100 mg daily.  Not effective.  86. Therapy: Deferred.  87. Other: s/p referral to neurology for workup of likely dementia.      TREATMENT PLAN/GOALS: Continue supportive psychotherapy efforts and medications as indicated. Treatment and medication options discussed during today's visit. Patient ackowledged and verbally consented to continue with current treatment plan and was educated on the importance of compliance with treatment and follow-up appointments.    MEDICATION ISSUES:  ERNESTINA reviewed as expected.  Discussed medication options and treatment plan of prescribed medication as well as the risks, benefits, and side effects including potential falls, possible impaired driving and metabolic adversities among others. Patient is agreeable to call the office with any worsening of symptoms or onset of side effects. Patient is agreeable to call 911 or go to the nearest ER should he/she begin having SI/HI. No medication side effects or related complaints today.     MEDS ORDERED DURING VISIT:  New Medications Ordered This Visit   Medications   • gabapentin (NEURONTIN) 300 MG capsule     Sig: Take 1 capsule by mouth 2 (Two) Times a Day.     Dispense:  60 capsule     Refill:  5     Not to exceed 3 additional fills before 01/15/2023   • venlafaxine XR (EFFEXOR-XR) 150 MG 24 hr capsule     Sig: Take 1 capsule by mouth Daily.     Dispense:  90 capsule     Refill:  1     Replaces old script (37.4icm7=598.5 mg daily). Thank you.       Return in about 3 months (around 4/30/2023).         This document has been electronically signed by Riki Peterson MD  January 31, 2023 11:55 EST      Part of this note may be an electronic transcription/translation of spoken language to printed text using the Dragon Dictation System.

## 2023-02-27 RX ORDER — LEVOTHYROXINE SODIUM 0.07 MG/1
TABLET ORAL
Qty: 90 TABLET | Refills: 1 | Status: SHIPPED | OUTPATIENT
Start: 2023-02-27

## 2023-03-10 DIAGNOSIS — F41.1 GENERALIZED ANXIETY DISORDER: ICD-10-CM

## 2023-03-10 RX ORDER — ATORVASTATIN CALCIUM 40 MG/1
TABLET, FILM COATED ORAL
Qty: 90 TABLET | Refills: 1 | Status: SHIPPED | OUTPATIENT
Start: 2023-03-10

## 2023-03-10 RX ORDER — LISINOPRIL 10 MG/1
TABLET ORAL
Qty: 90 TABLET | Refills: 1 | Status: SHIPPED | OUTPATIENT
Start: 2023-03-10

## 2023-03-10 RX ORDER — BUSPIRONE HYDROCHLORIDE 10 MG/1
10 TABLET ORAL DAILY
Qty: 90 TABLET | Refills: 1 | OUTPATIENT
Start: 2023-03-10

## 2023-03-10 NOTE — TELEPHONE ENCOUNTER
The original prescription was discontinued on 1/31/2023 by Riki Peterson MD for the following reason: Not Efficacious. Renewing this prescription may not be appropriate.    NEXT APPT WITH PROVIDER  Appointment with Riki Peterson MD (05/01/2023)    FLAGGED Refill not appropriate    PROVIDER PLEASE ADVISE

## 2023-03-16 ENCOUNTER — TELEPHONE (OUTPATIENT)
Dept: INTERNAL MEDICINE | Facility: CLINIC | Age: 73
End: 2023-03-16
Payer: MEDICARE

## 2023-03-16 RX ORDER — ERGOCALCIFEROL 1.25 MG/1
CAPSULE ORAL
Qty: 13 CAPSULE | Refills: 1 | Status: SHIPPED | OUTPATIENT
Start: 2023-03-16

## 2023-03-16 NOTE — TELEPHONE ENCOUNTER
Caller: NILDA    Relationship: Child    Best call back number: [phone]    Requested Prescriptions:    metoprolol tartrate (LOPRESSOR) 25 MG tablet metoprolol tartrate 25 mg oral tablet take 2 tablets (50 mg) by oral route once daily    Pharmacy where request should be sent: [facility]Y #[id number] - [place], [place] - [address] [facility] - [phone]  - [phone] FX     Additional details provided by patient: PATIENT IS OUT OF MEDICATION    Does the patient have less than a 3 day supply:  [x] Yes  [] No    Would you like a call back once the refill request has been completed: [x] Yes [] No    If the office needs to give you a call back, can they leave a voicemail: [x] Yes [] No    Alice Denney Rep   03/16/23 10:53 EDT

## 2023-04-14 NOTE — TELEPHONE ENCOUNTER
Add-on for Puetz 04-17  Received: Today  Jaquelin HERNANDEZ Gastro Procedure Preauth Pool  This patient has been scheduled with Dr. Evangelista Hernandez at [facility] [place] [facility] on 04-17.     Thanks, Britt      LMTCB

## 2023-05-01 ENCOUNTER — TELEMEDICINE (OUTPATIENT)
Dept: PSYCHIATRY | Facility: CLINIC | Age: 73
End: 2023-05-01
Payer: MEDICARE

## 2023-05-01 DIAGNOSIS — R68.89 FORGETFULNESS: ICD-10-CM

## 2023-05-01 DIAGNOSIS — R44.3 HALLUCINATIONS: ICD-10-CM

## 2023-05-01 DIAGNOSIS — M79.2 NEUROPATHIC PAIN: ICD-10-CM

## 2023-05-01 DIAGNOSIS — F41.1 GENERALIZED ANXIETY DISORDER: Primary | ICD-10-CM

## 2023-05-01 DIAGNOSIS — F33.1 MAJOR DEPRESSIVE DISORDER, RECURRENT EPISODE, MODERATE: ICD-10-CM

## 2023-05-01 NOTE — PATIENT INSTRUCTIONS
1.  Please return to clinic at your next scheduled visit.  Contact the clinic ([phone]) at least 24 hours prior in the event you need to cancel.  2.  Do no harm to yourself or others.    3.  Avoid alcohol and drugs.    4.  Take all medications as prescribed.  Please contact the clinic with any concerns. If you are in need of medication refills, please call the clinic at [phone].    5. Should you want to get in touch with your provider, Dr. Riki Peterson, please utilize [employer] or contact the office ([phone]), and staff will be able to page Dr. Peterson directly.  6.  In the event you have personal crisis, contact the following crisis numbers: Suicide Prevention Hotline 1-919.564.5509; [facility] Helpline 1-497-515-LDWR; [facility] Emergency Room [phone]; text HELLO to 867158; or 326.     SPECIFIC RECOMMENDATIONS:     1.      Medications discussed at this encounter:                   - no changes     2.      Psychotherapy recommendations:

## 2023-05-01 NOTE — PROGRESS NOTES
"Subjective   Payal Singer is a 73 y.o. female who presents today for follow up    Chief Complaint:  mdd meagan    History of Present Illness:     Chart review 4/15: Seen 3/30 to establish care. Hx of anx/dep avh in [place]. on both sertraline and venlafaxine, was on Seroquel. Hx of HTN, GERD. On gabapentin 600 tid, sertraline 200 d, venla 150 d, quetiap 25 qhs. March 2021 labs: cbc shows low mch, high rdw; Cr 1.15 high, abnormal lipids, TSH and fT4 wnl. No OP head imaging, ekg.     \"Payal\" and \"Meagan\"      5/1: Virtual visit via Zoom audio and video due to the COVID-19 pandemic.  Patient is accepting of and agreeable to visit.  The visit consisted of the patient and I. The patient is at home, and I am at the office.  Interview:  1. Chart review: No new.  2. Planning: Seroquel dose in the afternoon helps, but the real problem is a situational stressor that cannot be solved with the medication.  We brainstormed possible ideas regarding how to manage patient's 's behavior.  We discussed having him accompany patient to her appointments to develop familiarity with me in order for me to potentially take over his medications.  They do not think that will help.   3. \"I've been pretty good.\"  a. We moved to another house this Saturday.  b. More room, but 's behavior hasn't changed. It has a basement.  maria de jesus Rhodes: we should set up an appnt in person.  i. They are both in the walkout basement. They can't take stairs. They are around each constantly.  ii. In person: would be more open.  4. Mood/Depression: stable  5. Anxiety: stable  6. Panic attacks: n  7. Energy: stable   8. Concentration: stable  9. Sleeping: well  10. Eating: stable weight  11. Refills: n  12. Substances: n  13. Therapy: n  14. Medication compliant: y  15. SE:   16. No SI HI AVH.        1/31: Virtual visit via Zoom audio and video due to the COVID-19 pandemic.  Patient is accepting of and agreeable to visit.  The visit consisted of the patient and " "I. The patient is at home, and I am at the office.  Interview:  17. Chart review: No new.  18. Planning: Buspar not helpful. Stop. Still anxious and depressed at 3 - 7 pm, strangely. [employer] with a dose of seroquel.  Can also try increasing frequency of gabapentin, or low-dose trazodone.   19. \"Been going fairly good.\"  a. It has helped (seroquel at 2 pm).   b. Still has anxiety/dep b/w 3-7 pm. \"Have had it since I was in [employer].\"  i. Not really sure why.  ii. 6th grade, would have crying spells and would have to come home from school.  iii. We have problems at the house, .  iv. Really due to  getting upset around that time.  1. He refuses to take his meds  v. Situation cannot be avoided  vi. He has been confronted by Meagan, but it has not worked  20. Mood/Depression: stable  21. Anxiety: not as upset by problems as before  22. Panic attacks: n  23. Energy: stable  24. Concentration: stable  25. Sleeping: well  26. Eating: stable weight  27. Refills: y  28. Substances: n  29. Therapy: n  30. Medication compliant: y  31. No SI HI AVH.      11/30: Virtual visit via Zoom audio and video due to the COVID-19 pandemic.  Patient is accepting of and agreeable to visit.  The visit consisted of the patient and I. The patient is at home, and I am at the office.  Interview:  32. Chart review: No new.  33. Planning: No changes at last visit.  Check-in with Meagan regarding how well she is doing, consider referring to psychotherapy.  34. \"Doing good.\"  a.  has his days; kind unless he misses his meds. Anger issues.  35. Mood/Depression:  a. 3 pm: gets depressed, lasts till 7 pm.  36. Anxiety:  a. Around 3 pm I get agitated and upset. No trigger. On edge, restless, also lasts till 7 pm  37. Panic attacks: n  38. Energy: fair  39. Concentration: fair  40. Sleeping: well  41. Eating: stable  42. Refills: n  43. Substances: n  44. Therapy: n  45. Medication compliant: y  46. No SI HI AV.      8/31: Virtual " "visit via Zoom audio and video due to the COVID-19 pandemic.  Patient is accepting of and agreeable to visit.  The visit consisted of the patient and I. The patient is at home, and I am at the office.  Interview:  47. Chart review: No new.  48. Planning: Increased Effexor at last visit.  Targeting anxiety.  49. \"I'm good.\"  a. Better control of the anxiety.  i. Situational: both taking care of her   ii. Anxiety improved on the higher dose of effexor. 5/10.  iii. Agrees she is in a good place.  iv. Had crying spells when ran out of effexor  b. Per Meagan, most of it comes from her dad's depression and irritability. He gets verbal.  i. Trying to get him to see a psychiatrist  ii. Situational  iii. We discussed behavioral options  1. Worse at night  2. Triggers -- unexpected, can't anticipate  3. Dealing with vertigo  4. Family meeting -- agrees to trying that  5. Meagan tearful during visit  50. Mood/Depression: 5/10, a little better, but still some depressed mood  51. Panic attacks: n  52. Sleeping: well  53. Eating: stable  54. Refills: y  55. Substances: n  56. Therapy: n  57. Medication compliant: y  58. No SI HI AVH.      7/19: Virtual visit via Zoom audio and video due to the COVID-19 pandemic.  Patient is accepting of and agreeable to visit.  The visit consisted of the patient and I. The patient is at home, and I am at the office.  Interview:  59. Chart review: No new.  60. Planning: No changes made at last visit 3 months ago, patient was where she needed to be.  61. \"Doing well.\"  a. Dog is sick, so \"I'm a little shaky.\"  b. Fearful that whatever the dog has is contagious.  c. Sleeping well.  d. Buspar not working as well like it did.  62. Mood/Depression: 6/10, still has depressed mood, guilt, poor energy and concentration.  63. Anxiety: 8/10, due to situation (5 teenagers),  is 76 yo, dizzy all the time  a. Worrying, on edge, \"shaky,\" restless, muscle tension  64. Panic attacks: " "no  65. Sleeping: well  66. Eating: stable  67. Refills: y   68. Substances: n  69. Therapy: n  70. Medication compliant: y  71. No SI HI AVH.      4/19: Virtual visit via Zoom audio and video due to the COVID-19 pandemic.  Patient is accepting of and agreeable to visit.  The visit consisted of the patient and I. The patient is at home, and I am at the office.  Interview:  72. Chart review: Patient has now started gabapentin 300 mg twice daily.  This had previously been prescribed by dermatology for chronic scalp pain following biopsies.  She was seen by primary care March 14 and was doing well from a mental health standpoint.  CMP in March show slightly elevated creatinine 1.09, alk phos 125, otherwise reassuring CMP, thyroid studies, vitamin D, CBC.  Triglycerides are elevated.  73. \"Things are going good.\"  a. Gabapentin helps with anxiety. Morning and evening.  b. Buspar helps as well.  c. Sleeping well.   d. \"I believe I am where I need to be.\"  74. Refills: y  75. Therapy: n  76. Medication compliant: y  77. No SI HI AVH.      3/8: Virtual visit via Zoom audio and video due to the COVID-19 pandemic.  Patient is accepting of and agreeable to visit.  The visit consisted of the patient and I. The patient is at home, and I am at the office.  Interview:  78. Chart review: No new developments  79. \" I get depressed at 4:00 every day.\"  a. Lots of activity at this time.  Kids getting food, having to take care of the animals.  ismael Rhodes believes patient gets overwhelmed during this time.  After she mention this, the patient agreed.  c. Has never tried BuSpar.  Has been on gabapentin in the past, is not presently on it.  Was on it for neuropathic pain.  d. Otherwise depression and anxiety are extremely well controlled.  Increasing Effexor was helpful for the patient's anxiety.  e. Generally sleeping well.  80. Medication compliant: Yes  81. No SI HI AVH.      11/8: Virtual visit via Zoom audio and video due to the " "COVID-19 pandemic.  Patient is accepting of and agreeable to visit.  The visit consisted of the patient and I.  Interview:  82. Chart review: Seen by primary care October 8.  Hypertension well controlled.  Labs demonstrate elevated CO2 of 29.4 on CMP, creatinine is 1.00 normal, thyroid studies are normal, elevated triglycerides, normal CBC.  Patient underwent an MRI for hallucinations and it shows no acute/nothing.  83. \"Pretty good.\"  84. Depression/Mood: \"depressed in the afternoon.\"  a. Lifts after she takes her medication.  b. Seasonal component  c. [facility] and its weather, and her family.  1. Anhedonia: denies  2. Guilt or hopelessness: denies  3. Energy: down until the afternoon  4. Concentration: sometimes  5. Weight loss or weight gain: lost 20 lbs over a year  6. Psychomotor retardation or agitation: possible retardation  7. Insomnia: denies  85. Anxiety:  1. Uncontrolled worrying: yes  2. Muscle tension: sometimes  3. Fatigue: down until the afternoon  4. Concentration: sometimes  5. Restlessness/feeling on edge: sometimes in the day, not at night  6. Irritability: sometimes  7. Insomnia: denies  86. Sleeping: yes  87. Eating: well  88. Substances: denies  89. Therapy: deferred  90. Medication compliant: some issues with compliance (on accident)  a. Taking 150 mg of venlafaxine for the last week, rather than 75 mg due to pharmacy error.  91. No SI HI AVH.  a. Sees bugs, but they actually have a bug issue in the apartment. (ladybugs)  b. Meagan does not believe she is hallucinating.      9/7: Virtual visit via Zoom audio and video due to the COVID-19 pandemic.  Patient is accepting of and agreeable to visit.  The visit consisted of the patient and I.  Interview:  92. Records review shows no new developments.  93. \"Everything's pretty good.\"  94. No hallucinations at all. Might see a shadow.  95. Depression better.  96.  doing better, but has dizziness.  97. Meagan in agreement.  98. No SI HI " "AVH.      7/27: Virtual visit via Zoom audio and video due to the COVID-19 pandemic. Patient is accepting of and agreeable to appointment. The appointment consisted of the patient and I only.   Interview: \"Good\"   1. Having VH less frequently, once or twice a week. Like someone passes me real fast. Not experiencing spiders.  2. Sleeping well.  3.  doing better. Worrying is much better.  4. Medicine has been helpful.  5. Still feels depressed. Homesick for [place].   6. No panic attacks in last two weeks.  7. Much clearer today, linear. Better historian.  8. Meagan agrees.  9. No SI HI AVH.    Previous:  Still having panic attacks once a week. Sx: soa, sweating, palpitations, tachycardia, fear, no derealization/depersonalization, last 30 min, leaves room where she was at. No triggers. Fear of another panic attack happening.     Anxiety due to 's health; admitted to hospital recently for dehydration. Notes uncontrolled worrying, muscle tension, irritability, restlessness, trouble sleeping - will wake at 3 am and can't go back to sleep. Duration is at least 6 months, since moved here.    Still having hallucinations every night before she goes to bed. Sees shadows walk past bed. No AH. Sometimes sees spiders during the day on the floor; happens once or twice a week.     Seroquel helped to some extent.     Again, patient is somewhat of a poor historian. Often times she will answer a question with \"no,\" and then contradict her self moments later.    Collateral from Meagan: patient did have a UTI (UA confirmed); was put on a course of abx. Feels her mom's confusion has improved since then.      Past Surgical History:  Past Surgical History:   Procedure Laterality Date   • CATARACT EXTRACTION  2002   • CHOLECYSTECTOMY OPEN  1971       Problem List:  Patient Active Problem List   Diagnosis   • Forgetfulness   • Anxiety   • Depression   • Esophageal reflux   • Hallucinations   • Head injury   • Hypertension   • " Other acute sinusitis   • PTSD (post-traumatic stress disorder)   • Mixed hyperlipidemia   • Acquired hypothyroidism   • Vitamin D deficiency   • Neuropathic pain       Allergy:   Allergies   Allergen Reactions   • Floxin Otic [Ofloxacin] Rash        Discontinued Medications:  There are no discontinued medications.    Current Medications:   Current Outpatient Medications   Medication Sig Dispense Refill   • acetaminophen (TYLENOL) 500 MG tablet Take 500 mg by mouth Every 6 (Six) Hours As Needed for Mild Pain.     • Ascorbic Acid (Vitamin C) 250 MG chewable tablet Chew.     • aspirin 81 MG EC tablet aspirin 81 mg oral tablet,delayed release (DR/EC) take 1 tablet (81 mg) by oral route once daily   Active     • atorvastatin (LIPITOR) 40 MG tablet TAKE 1 TABLET BY MOUTH EVERY DAY 90 tablet 1   • gabapentin (NEURONTIN) 300 MG capsule Take 1 capsule by mouth 2 (Two) Times a Day. 60 capsule 5   • levothyroxine (SYNTHROID, LEVOTHROID) 75 MCG tablet TAKE 1 TABLET BY MOUTH EVERY DAY 90 tablet 1   • lisinopril (PRINIVIL,ZESTRIL) 10 MG tablet TAKE 1 TABLET BY MOUTH EVERY DAY 90 tablet 1   • metoprolol tartrate (LOPRESSOR) 25 MG tablet TAKE 2 TABLETS BY MOUTH EVERY  tablet 0   • multivitamin with minerals tablet tablet Take 1 tablet by mouth Daily.     • omeprazole (priLOSEC) 40 MG capsule Take 1 capsule by mouth Daily. 90 capsule 1   • QUEtiapine (SEROquel) 25 MG tablet 25 mg (1 tab) at 2pm, 50 mg (2 tab) nightly 180 tablet 3   • venlafaxine XR (EFFEXOR-XR) 150 MG 24 hr capsule Take 1 capsule by mouth Daily. 90 capsule 1   • vitamin D (ERGOCALCIFEROL) 1.25 MG (54715 UT) capsule capsule TAKE 1 CAPSULE BY MOUTH 1 TIME PER WEEK. 13 capsule 1   • Zinc Sulfate (ZINC 15 PO) Take  by mouth.       No current facility-administered medications for this visit.       Past Medical History:  Past Medical History:   Diagnosis Date   • Acid reflux    • Allergies    • Anxiety    • Depression    • Forgetfulness    • Hallucinations  "04/15/2021   • Head injury    • HTN (hypertension)    • PTSD (post-traumatic stress disorder)          Social History     Socioeconomic History   • Marital status: Unknown   Tobacco Use   • Smoking status: Never   • Smokeless tobacco: Never   Vaping Use   • Vaping Use: Never used   Substance and Sexual Activity   • Alcohol use: Never   • Drug use: Never   • Sexual activity: Not Currently         Family History   Problem Relation Age of Onset   • Stroke Mother    • Heart disease Mother    • Stroke Father    • Heart disease Father    • Diabetes Father    • Stroke Sister    • Diabetes Sister    • Stroke Brother    • Diabetes Brother    • Diabetes Other         AUNT/UNCLE   • Anxiety disorder Other        Mental Status Exam:   Hygiene:   good, at home  Cooperation:  Cooperative  Eye Contact:  Good  Psychomotor Behavior:  Appropriate  Affect:  Appropriate, mood congruent  Mood: \"I'm good\"  Speech:  Normal  Thought Process:  Goal directed  Thought Content:  Normal and Mood congruent  Suicidal:  None  Homicidal:  None  Hallucinations:  None  Delusion:  None  Memory:  Deficits  Orientation:  Grossly intact  Reliability:  fair  Insight:  Fair  Judgement:  Fair  Impulse Control:  Fair  Physical/Medical Issues:  No      Review of Systems:  Review of Systems   Constitutional: Positive for fatigue. Negative for diaphoresis.   HENT: Negative for drooling.    Eyes: Positive for visual disturbance.   Respiratory: Positive for cough. Negative for shortness of breath.    Cardiovascular: Negative for chest pain, palpitations and leg swelling.   Gastrointestinal: Negative for diarrhea, nausea and vomiting.   Endocrine: Negative for cold intolerance and heat intolerance.   Genitourinary: Negative for difficulty urinating.   Musculoskeletal: Negative for joint swelling.   Allergic/Immunologic: Negative for immunocompromised state.   Neurological: Positive for light-headedness. Negative for dizziness, seizures, syncope, speech difficulty, " numbness and headaches.   Hematological: Negative for adenopathy.   Psychiatric/Behavioral: Negative for sleep disturbance.         Physical Exam:  Physical Exam    Vital Signs:   There were no vitals taken for this visit.     Lab Results:   No visits with results within 6 Month(s) from this visit.   Latest known visit with results is:   Office Visit on 03/14/2022   Component Date Value Ref Range Status   • Glucose 03/14/2022 94  65 - 99 mg/dL Final   • BUN 03/14/2022 15  8 - 23 mg/dL Final   • Creatinine 03/14/2022 1.09 (H)  0.57 - 1.00 mg/dL Final   • Sodium 03/14/2022 142  136 - 145 mmol/L Final   • Potassium 03/14/2022 3.8  3.5 - 5.2 mmol/L Final   • Chloride 03/14/2022 102  98 - 107 mmol/L Final   • CO2 03/14/2022 28.0  22.0 - 29.0 mmol/L Final   • Calcium 03/14/2022 9.7  8.6 - 10.5 mg/dL Final   • Total Protein 03/14/2022 7.6  6.0 - 8.5 g/dL Final   • Albumin 03/14/2022 4.10  3.50 - 5.20 g/dL Final   • ALT (SGPT) 03/14/2022 13  1 - 33 U/L Final   • AST (SGOT) 03/14/2022 15  1 - 32 U/L Final   • Alkaline Phosphatase 03/14/2022 125 (H)  39 - 117 U/L Final   • Total Bilirubin 03/14/2022 0.2  0.0 - 1.2 mg/dL Final   • Globulin 03/14/2022 3.5  gm/dL Final   • A/G Ratio 03/14/2022 1.2  g/dL Final   • BUN/Creatinine Ratio 03/14/2022 13.8  7.0 - 25.0 Final   • Anion Gap 03/14/2022 12.0  5.0 - 15.0 mmol/L Final   • eGFR 03/14/2022 53.8 (L)  >60.0 mL/min/1.73 Final    National Kidney Foundation and American Society of Nephrology (ASN) Task Force recommended calculation based on the Chronic Kidney Disease Epidemiology Collaboration (CKD-EPI) equation refit without adjustment for race.   • TSH 03/14/2022 1.040  0.270 - 4.200 uIU/mL Final   • Total Cholesterol 03/14/2022 169  0 - 200 mg/dL Final   • Triglycerides 03/14/2022 197 (H)  0 - 150 mg/dL Final   • HDL Cholesterol 03/14/2022 46  40 - 60 mg/dL Final   • LDL Cholesterol  03/14/2022 90  0 - 100 mg/dL Final   • VLDL Cholesterol 03/14/2022 33  5 - 40 mg/dL Final   •  LDL/HDL Ratio 03/14/2022 1.82   Final   • 25 Hydroxy, Vitamin D 03/14/2022 44.1  30.0 - 100.0 ng/ml Final   • WBC 03/14/2022 8.03  3.40 - 10.80 10*3/mm3 Final   • RBC 03/14/2022 4.67  3.77 - 5.28 10*6/mm3 Final   • Hemoglobin 03/14/2022 12.6  12.0 - 15.9 g/dL Final   • Hematocrit 03/14/2022 38.7  34.0 - 46.6 % Final   • MCV 03/14/2022 82.9  79.0 - 97.0 fL Final   • MCH 03/14/2022 27.0  26.6 - 33.0 pg Final   • MCHC 03/14/2022 32.6  31.5 - 35.7 g/dL Final   • RDW 03/14/2022 13.2  12.3 - 15.4 % Final   • RDW-SD 03/14/2022 39.7  37.0 - 54.0 fl Final   • MPV 03/14/2022 11.2  6.0 - 12.0 fL Final   • Platelets 03/14/2022 305  140 - 450 10*3/mm3 Final   • Neutrophil % 03/14/2022 79.9 (H)  42.7 - 76.0 % Final   • Lymphocyte % 03/14/2022 11.2 (L)  19.6 - 45.3 % Final   • Monocyte % 03/14/2022 6.6  5.0 - 12.0 % Final   • Eosinophil % 03/14/2022 1.4  0.3 - 6.2 % Final   • Basophil % 03/14/2022 0.5  0.0 - 1.5 % Final   • Immature Grans % 03/14/2022 0.4  0.0 - 0.5 % Final   • Neutrophils, Absolute 03/14/2022 6.42  1.70 - 7.00 10*3/mm3 Final   • Lymphocytes, Absolute 03/14/2022 0.90  0.70 - 3.10 10*3/mm3 Final   • Monocytes, Absolute 03/14/2022 0.53  0.10 - 0.90 10*3/mm3 Final   • Eosinophils, Absolute 03/14/2022 0.11  0.00 - 0.40 10*3/mm3 Final   • Basophils, Absolute 03/14/2022 0.04  0.00 - 0.20 10*3/mm3 Final   • Immature Grans, Absolute 03/14/2022 0.03  0.00 - 0.05 10*3/mm3 Final   • nRBC 03/14/2022 0.0  0.0 - 0.2 /100 WBC Final   • Amphetamine Screen, Urine 03/14/2022 Negative  Negative Final   • AMP INTERNAL CONTROL 03/14/2022 Passed  Passed Final   • Barbiturates Screen, Urine 03/14/2022 Negative  Negative Final   • BARBITURATE INTERNAL CONTROL 03/14/2022 Passed  Passed Final   • Buprenorphine, Screen, Urine 03/14/2022 Negative  Negative Final   • BUPRENORPHINE INTERNAL CONTROL 03/14/2022 Passed  Passed Final   • Benzodiazepine Screen, Urine 03/14/2022 Negative  Negative Final   • BENZODIAZEPINE INTERNAL CONTROL 03/14/2022  Passed  Passed Final   • Cocaine Screen, Urine 03/14/2022 Negative  Negative Final   • COCAINE INTERNAL CONTROL 03/14/2022 Passed  Passed Final   • MDMA (ECSTASY) 03/14/2022 Negative  Negative Final   • MDMA (ECSTASY) INTERNAL CONTROL 03/14/2022 Passed  Passed Final   • Methamphetamine, Ur 03/14/2022 Negative  Negative Final   • METHAMPHETAMINE INTERNAL CONTROL 03/14/2022 Passed  Passed Final   • Methadone Screen, Urine 03/14/2022 Negative  Negative Final   • METHADONE INTERNAL CONTROL 03/14/2022 Passed  Passed Final   • Opiate Screen 03/14/2022 Negative  Negative Final   • OPIATES INTERNAL CONTROL 03/14/2022 Passed  Passed Final   • Oxycodone Screen, Urine 03/14/2022 Negative  Negative Final   • OXYCODONE INTERNAL CONTROL 03/14/2022 Passed  Passed Final   • Phencyclidine (PCP), Urine 03/14/2022 Negative  Negative Final   • PHENCYCLIDINE INTERNAL CONTROL 03/14/2022 Passed  Passed Final   • THC, Screen, Urine 03/14/2022 Negative  Negative Final   • THC INTERNAL CONTROL 03/14/2022 Passed  Passed Final   • Lot Number 03/14/2022 [id number]   Final   • Expiration Date 03/14/2022 3/31/23   Final       EKG Results:  No orders to display       Imaging Results:  No Images in the past 120 days found..      Assessment & Plan   Diagnoses and all orders for this visit:    1. Generalized anxiety disorder (Primary)    2. Major depressive disorder, recurrent episode, moderate    3. Hallucinations    4. Neuropathic pain    5. Forgetfulness      Presentation most consistent with developing dementia, likely Lewy body dementia.  Patient also reports worsening gait abnormalities; she fell in the summer of last year, injuring her head, and requiring stitches.  Patient also has a history of depression, possible PTSD by review of chart.  Patient did not mention PTSD during the interview, but she is not a very good historian.  Often times she will say no to a question, and then contradict herself only a few moments later.  She has some  trouble with knowing her medications or her medical conditions.      5/1: Stable. Couldn't figure out Twilio. Short visit. See back in person in 6 wks to discuss strategies for mx behavioral concerns.         1/31: Seroquel dose in the afternoon helps, but the real problem is a situational stressor that cannot be solved with the medication.  We brainstormed possible ideas regarding how to manage patient's 's behavior.  We discussed having him accompany patient to her appointments to develop familiarity with me in order for me to potentially take over her medications.  They do not think that will help.  17 Progress: Improved, not a goal  3 months      11/30: Buspar not helpful. Stop. Still anxious and depressed at 3 - 7 pm, strangely. Target with a dose of seroquel.  Can also try increasing frequency of gabapentin, low-dose trazodone.  16 minutes of supportive psychotherapy  Prognosis: good  Progress: residual dep and anx  2 mos      8/31: Pt's anxiety comes from taking care of her father, per Meagan. He's dealing with depression. Meagan is stressed. Discussed getting more help to take care of pt and her father. Pt is better. No changes to meds. 20 minutes of supportive psychotherapy  Treatment plan: Medication management and supportive psychotherapy  Prognosis: good  Progress: improving  3 mos      7/19: Increase effexor to target residual anx and dep.  Consider increasing BuSpar soon at next appointment.  17 minutes of supportive psychotherapy Treatment plan: Medication management and supportive psychotherapy  Prognosis: Good  Progress: Worsened anxiety and depression  6 weeks    4/19: Patient is well and right where she should be.  7 minutes of supportive psychotherapy3 months    3/8: Target the feeling of anxiety at 4 PM by starting BuSpar at 3 PM.  Consider switching to gabapentin if BuSpar does not help.  Would start at a low dose of 100 mg.  16 minutes of supportive psychotherapy5 weeks    11/8:  Depressed in the mornings. Light box, increase venlafaxine. 17 minutes of supportive psychotherapy  8 wks      9/7: Doing even better.  8 weeks.  No changes.    7/27: Markedly improved, and far more linear.  Still has residual anxiety and depression.  Hallucinations are still present but they do not cause distress at all to the patient.  Continue Seroquel at the present dose.  Double the dose of venlafaxine. See back in 6 weeks.  This was also discussed with Meagan, patient's daughter.    Previous:  Caution again advised in terms of increasing the dose given the likely sensitivity she has to antipsychotics.  Psychotherapy is deferred at this time.  S/p referral to neurologist is appropriate for further management.     Visit Diagnoses:    ICD-10-CM ICD-9-CM   1. Generalized anxiety disorder  F41.1 300.02   2. Major depressive disorder, recurrent episode, moderate  F33.1 296.32   3. Hallucinations  R44.3 780.1   4. Neuropathic pain  M79.2 729.2   5. Forgetfulness  R68.89 780.99       PLAN:  99. Risk Assessment: Risk of self-harm acutely is low. Risk factors include anxiety disorder, depressive disorder, access to weapons, recent psychosocial stressors (pandemic). Protective factors include no family history, no present SI, no history of suicide attempts or self-harm in the past, no access to weapons, no AODA, healthcare seeking, future orientation, willingness to engage in care. Risk of self-harm chronically is also low, but could be further elevated in the event of treatment noncompliance and/or AODA.  100. Safety: No acute safety concerns.  101. Medications:   a. CONTINUE quetiapine 50 mg nightly, 25 mg q2pm. Risks, benefits, alternatives discussed with patient including nausea and vomiting, GI upset, sedation, akathisia, hypotension, increased appetite, lowering of seizure threshold, theoretical risk of tardive dyskinesia, extrapyramidal symptoms, restless legs syndrome. After discussion of these risks and  benefits, the patient voiced understanding and agreed to proceed.  b. CONTINUE venlafaxine 150 mg PO QDAY. Risks, benefits, alternatives discussed with patient including GI upset, nausea vomiting diarrhea, theoretical decrease of seizure threshold predisposing the patient to a slightly higher seizure risk, headaches, sexual dysfunction, serotonin syndrome, bleeding risk, increased suicidality in patients 24 years and younger.  After discussion of these risks and benefits, the patient voiced understanding and agreed to proceed.  c. CONTINUE with gabapentin 300 twice daily. Risks, benefits, alternatives discussed with patient including sedation, dizziness/falls risk, GI upset, weight gain.  After discussion of these risks and benefits, the patient voiced understanding and agreed to proceed. UDS ordered, Ernestina reviewed.  i. S/P:  1. BuSpar 10 mg at 3 PM daily. Ineffective.  2. sertraline 100 mg daily.  Not effective.  102. Therapy: Deferred.  103. Other: s/p referral to neurology for workup of likely dementia.      TREATMENT PLAN/GOALS: Continue supportive psychotherapy efforts and medications as indicated. Treatment and medication options discussed during today's visit. Patient ackowledged and verbally consented to continue with current treatment plan and was educated on the importance of compliance with treatment and follow-up appointments.    MEDICATION ISSUES:  ERNESTINA reviewed as expected.  Discussed medication options and treatment plan of prescribed medication as well as the risks, benefits, and side effects including potential falls, possible impaired driving and metabolic adversities among others. Patient is agreeable to call the office with any worsening of symptoms or onset of side effects. Patient is agreeable to call 911 or go to the nearest ER should he/she begin having SI/HI. No medication side effects or related complaints today.     MEDS ORDERED DURING VISIT:  No orders of the defined types were placed  in this encounter.      Return in about 6 weeks (around 6/12/2023) for in person.         This document has been electronically signed by Riki Peterson MD  May 1, 2023 12:00 EDT      Part of this note may be an electronic transcription/translation of spoken language to printed text using the Dragon Dictation System.

## 2023-06-14 RX ORDER — OMEPRAZOLE 40 MG/1
CAPSULE, DELAYED RELEASE ORAL
Qty: 90 CAPSULE | Refills: 1 | OUTPATIENT
Start: 2023-06-14

## 2023-06-14 RX ORDER — OMEPRAZOLE 40 MG/1
40 CAPSULE, DELAYED RELEASE ORAL DAILY
Qty: 30 CAPSULE | Refills: 0 | Status: SHIPPED | OUTPATIENT
Start: 2023-06-14

## 2023-06-14 NOTE — TELEPHONE ENCOUNTER
She has not had a visit in the last year.  Please contact and schedule follow-up in the next 30 days.  If she is willing to schedule follow-up I will be happy to send in 30 days this medication.

## 2023-07-25 ENCOUNTER — TELEMEDICINE (OUTPATIENT)
Dept: PSYCHIATRY | Facility: CLINIC | Age: 73
End: 2023-07-25
Payer: MEDICARE

## 2023-07-25 DIAGNOSIS — F41.1 GENERALIZED ANXIETY DISORDER: Primary | ICD-10-CM

## 2023-07-25 DIAGNOSIS — R44.3 HALLUCINATIONS: ICD-10-CM

## 2023-07-25 DIAGNOSIS — F33.0 MILD RECURRENT MAJOR DEPRESSION: ICD-10-CM

## 2023-07-25 DIAGNOSIS — M79.2 NEUROPATHIC PAIN: ICD-10-CM

## 2023-07-25 PROCEDURE — 99214 OFFICE O/P EST MOD 30 MIN: CPT | Performed by: STUDENT IN AN ORGANIZED HEALTH CARE EDUCATION/TRAINING PROGRAM

## 2023-07-25 PROCEDURE — 1159F MED LIST DOCD IN RCRD: CPT | Performed by: STUDENT IN AN ORGANIZED HEALTH CARE EDUCATION/TRAINING PROGRAM

## 2023-07-25 PROCEDURE — 1160F RVW MEDS BY RX/DR IN RCRD: CPT | Performed by: STUDENT IN AN ORGANIZED HEALTH CARE EDUCATION/TRAINING PROGRAM

## 2023-07-25 PROCEDURE — 90833 PSYTX W PT W E/M 30 MIN: CPT | Performed by: STUDENT IN AN ORGANIZED HEALTH CARE EDUCATION/TRAINING PROGRAM

## 2023-07-25 NOTE — PROGRESS NOTES
"Subjective   Payal Singer is a 73 y.o. female who presents today for follow up    Chief Complaint:  mdd meagan    History of Present Illness:     Chart review 4/15: Seen 3/30 to establish care. Hx of anx/dep avh in [place]. on both sertraline and venlafaxine, was on Seroquel. Hx of HTN, GERD. On gabapentin 600 tid, sertraline 200 d, venla 150 d, quetiap 25 qhs. March 2021 labs: cbc shows low mch, high rdw; Cr 1.15 high, abnormal lipids, TSH and fT4 wnl. No OP head imaging, ekg.     \"Payal\" and \"Meagan\"      7/25: In person interview:  Chart review: no new  Planning: Stable, well. Discussed behavioral strategies to manage 's behavior.  Calming:  Children or friends calling on the phone  Meals available (food)  snacks  \"We did a different menu.\"  \"He just seems like he's feeling better.\"  Meagan: \"they helped a little.\" The food helped. The meds help.  Mood/Depression: good mood  Anxiety: less anxious, on ege  Panic attacks: n  Energy: baseline low  Concentration: baseline low  Sleeping: well  Eating: stable  Refills: y  Substances: def  Therapy: n  Medication compliant: y  SE: n  No SI HI AVH.        6/20: In person.  Interview:  Chart review: no new.  Planning: Stable. Couldn't figure out Twilio. Short visit. See back in person in 6 wks to discuss strategies for mx behavioral concerns.   \"Yeah, it's my .\"  He yells at me.  Triggers:   Moving helped. More space.   may have PTSD.  He may be medication noncompliant.  Calming:  Children or friends calling on the phone  Meals available (food)  snacks  Mood/Depression: I'm doing ok  Anxiety: good, stable  Panic attacks: n  Energy: baseline down  Concentration: baseline down  Sleeping: fairly well  Eating: stable  Refills: y  Substances: n  Therapy: n  Medication compliant: y  SE: n  No SI HI AVH.        5/1: Virtual visit via Zoom audio and video due to the COVID-19 pandemic.  Patient is accepting of and agreeable to visit.  The visit consisted of the " "patient and I. The patient is at home, and I am at the office.  Interview:  Chart review: No new.  Planning: Seroquel dose in the afternoon helps, but the real problem is a situational stressor that cannot be solved with the medication.  We brainstormed possible ideas regarding how to manage patient's 's behavior.  We discussed having him accompany patient to her appointments to develop familiarity with me in order for me to potentially take over his medications.  They do not think that will help.   \"I've been pretty good.\"  We moved to another house this Saturday.  More room, but 's behavior hasn't changed. It has a basement.  Meagan: we should set up an appnt in person.  They are both in the walkout basement. They can't take stairs. They are around each constantly.  In person: would be more open.  Mood/Depression: stable  Anxiety: stable  Panic attacks: n  Energy: stable   Concentration: stable  Sleeping: well  Eating: stable weight  Refills: n  Substances: n  Therapy: n  Medication compliant: y  SE:   No SI HI AVH.        1/31: Virtual visit via Zoom audio and video due to the COVID-19 pandemic.  Patient is accepting of and agreeable to visit.  The visit consisted of the patient and I. The patient is at home, and I am at the office.  Interview:  Chart review: No new.  Planning: Buspar not helpful. Stop. Still anxious and depressed at 3 - 7 pm, strangely. [employer] with a dose of seroquel.  Can also try increasing frequency of gabapentin, or low-dose trazodone.   \"Been going fairly good.\"  It has helped (seroquel at 2 pm).   Still has anxiety/dep b/w 3-7 pm. \"Have had it since I was in [employer].\"  Not really sure why.  6th grade, would have crying spells and would have to come home from school.  We have problems at the house, .  Really due to  getting upset around that time.  He refuses to take his meds  Situation cannot be avoided  He has been confronted by Meagan, but it has not " "worked  Mood/Depression: stable  Anxiety: not as upset by problems as before  Panic attacks: n  Energy: stable  Concentration: stable  Sleeping: well  Eating: stable weight  Refills: y  Substances: n  Therapy: n  Medication compliant: y  No SI HI AVH.    ...      Previous:  Still having panic attacks once a week. Sx: soa, sweating, palpitations, tachycardia, fear, no derealization/depersonalization, last 30 min, leaves room where she was at. No triggers. Fear of another panic attack happening.     Anxiety due to 's health; admitted to hospital recently for dehydration. Notes uncontrolled worrying, muscle tension, irritability, restlessness, trouble sleeping - will wake at 3 am and can't go back to sleep. Duration is at least 6 months, since moved here.    Still having hallucinations every night before she goes to bed. Sees shadows walk past bed. No AH. Sometimes sees spiders during the day on the floor; happens once or twice a week.     Seroquel helped to some extent.     Again, patient is somewhat of a poor historian. Often times she will answer a question with \"no,\" and then contradict her self moments later.    Collateral from Meagan: patient did have a UTI (UA confirmed); was put on a course of abx. Feels her mom's confusion has improved since then.      Past Surgical History:  Past Surgical History:   Procedure Laterality Date    CATARACT EXTRACTION  2002    CHOLECYSTECTOMY OPEN  1971       Problem List:  Patient Active Problem List   Diagnosis    Forgetfulness    Anxiety    Depression    Esophageal reflux    Hallucinations    Head injury    Hypertension    Other acute sinusitis    PTSD (post-traumatic stress disorder)    Mixed hyperlipidemia    Acquired hypothyroidism    Vitamin D deficiency    Neuropathic pain       Allergy:   Allergies   Allergen Reactions    Floxin Otic [Ofloxacin] Rash        Discontinued Medications:  There are no discontinued medications.    Current Medications:   Current " Outpatient Medications   Medication Sig Dispense Refill    acetaminophen (TYLENOL) 500 MG tablet Take 1 tablet by mouth Every 6 (Six) Hours As Needed for Mild Pain.      Ascorbic Acid (Vitamin C) 250 MG chewable tablet Chew. (Patient not taking: Reported on 6/20/2023)      aspirin 81 MG EC tablet aspirin 81 mg oral tablet,delayed release (DR/EC) take 1 tablet (81 mg) by oral route once daily   Active      atorvastatin (LIPITOR) 40 MG tablet TAKE 1 TABLET BY MOUTH EVERY DAY 90 tablet 1    gabapentin (NEURONTIN) 300 MG capsule Take 1 capsule by mouth 2 (Two) Times a Day. 60 capsule 5    levothyroxine (SYNTHROID, LEVOTHROID) 75 MCG tablet TAKE 1 TABLET BY MOUTH EVERY DAY 90 tablet 1    lisinopril (PRINIVIL,ZESTRIL) 10 MG tablet TAKE 1 TABLET BY MOUTH EVERY DAY 90 tablet 1    metoprolol tartrate (LOPRESSOR) 25 MG tablet TAKE 2 TABLETS BY MOUTH EVERY DAY 60 tablet 0    multivitamin with minerals tablet tablet Take 1 tablet by mouth Daily. (Patient not taking: Reported on 6/20/2023)      omeprazole (priLOSEC) 40 MG capsule Take 1 capsule by mouth Daily. 30 capsule 0    QUEtiapine (SEROquel) 25 MG tablet 25 MG (1 TAB) AT 2PM, 50 MG (2 TAB) NIGHTLY 180 tablet 3    venlafaxine XR (EFFEXOR-XR) 150 MG 24 hr capsule Take 1 capsule by mouth Daily. 90 capsule 1    vitamin D (ERGOCALCIFEROL) 1.25 MG (88801 UT) capsule capsule TAKE 1 CAPSULE BY MOUTH 1 TIME PER WEEK. 13 capsule 1    Zinc Sulfate (ZINC 15 PO) Take  by mouth. (Patient not taking: Reported on 6/20/2023)       No current facility-administered medications for this visit.       Past Medical History:  Past Medical History:   Diagnosis Date    Acid reflux     Allergies     Anxiety     Depression     Forgetfulness     Hallucinations 04/15/2021    Head injury     HTN (hypertension)     PTSD (post-traumatic stress disorder)          Social History     Socioeconomic History    Marital status: Unknown   Tobacco Use    Smoking status: Never    Smokeless tobacco: Never   Vaping  "Use    Vaping Use: Never used   Substance and Sexual Activity    Alcohol use: Never    Drug use: Never    Sexual activity: Not Currently         Family History   Problem Relation Age of Onset    Stroke Mother     Heart disease Mother     Stroke Father     Heart disease Father     Diabetes Father     Stroke Sister     Diabetes Sister     Stroke Brother     Diabetes Brother     Diabetes Other         AUNT/UNCLE    Anxiety disorder Other        Mental Status Exam:   Hygiene:   good, at home  Cooperation:  Cooperative  Eye Contact:  Good  Psychomotor Behavior:  Appropriate  Affect:  Appropriate, mood congruent  Mood: \"I'm doing better... he's doing a bit better\"  Speech:  Normal  Thought Process:  Goal directed  Thought Content:  Normal and Mood congruent  Suicidal:  None  Homicidal:  None  Hallucinations:  None  Delusion:  None  Memory:  Deficits  Orientation:  Grossly intact  Reliability:  fair  Insight:  Fair  Judgement:  Fair  Impulse Control:  Fair  Physical/Medical Issues:  No      Review of Systems:  Review of Systems   Constitutional:  Positive for fatigue. Negative for diaphoresis.   HENT:  Negative for drooling.    Eyes:  Negative for visual disturbance.   Respiratory:  Positive for cough. Negative for shortness of breath.    Cardiovascular:  Negative for chest pain, palpitations and leg swelling.   Gastrointestinal:  Negative for diarrhea, nausea and vomiting.   Endocrine: Negative for cold intolerance and heat intolerance.   Genitourinary:  Negative for difficulty urinating.   Musculoskeletal:  Negative for joint swelling.   Allergic/Immunologic: Negative for immunocompromised state.   Neurological:  Positive for light-headedness. Negative for dizziness, seizures, syncope, speech difficulty, numbness and headaches.   Hematological:  Negative for adenopathy.   Psychiatric/Behavioral:  Positive for sleep disturbance.        Physical Exam:  Physical Exam    Vital Signs:   There were no vitals taken for this " visit.     Lab Results:   No visits with results within 6 Month(s) from this visit.   Latest known visit with results is:   Office Visit on 03/14/2022   Component Date Value Ref Range Status    Glucose 03/14/2022 94  65 - 99 mg/dL Final    BUN 03/14/2022 15  8 - 23 mg/dL Final    Creatinine 03/14/2022 1.09 (H)  0.57 - 1.00 mg/dL Final    Sodium 03/14/2022 142  136 - 145 mmol/L Final    Potassium 03/14/2022 3.8  3.5 - 5.2 mmol/L Final    Chloride 03/14/2022 102  98 - 107 mmol/L Final    CO2 03/14/2022 28.0  22.0 - 29.0 mmol/L Final    Calcium 03/14/2022 9.7  8.6 - 10.5 mg/dL Final    Total Protein 03/14/2022 7.6  6.0 - 8.5 g/dL Final    Albumin 03/14/2022 4.10  3.50 - 5.20 g/dL Final    ALT (SGPT) 03/14/2022 13  1 - 33 U/L Final    AST (SGOT) 03/14/2022 15  1 - 32 U/L Final    Alkaline Phosphatase 03/14/2022 125 (H)  39 - 117 U/L Final    Total Bilirubin 03/14/2022 0.2  0.0 - 1.2 mg/dL Final    Globulin 03/14/2022 3.5  gm/dL Final    A/G Ratio 03/14/2022 1.2  g/dL Final    BUN/Creatinine Ratio 03/14/2022 13.8  7.0 - 25.0 Final    Anion Gap 03/14/2022 12.0  5.0 - 15.0 mmol/L Final    eGFR 03/14/2022 53.8 (L)  >60.0 mL/min/1.73 Final    National Kidney Foundation and American Society of Nephrology (ASN) Task Force recommended calculation based on the Chronic Kidney Disease Epidemiology Collaboration (CKD-EPI) equation refit without adjustment for race.    TSH 03/14/2022 1.040  0.270 - 4.200 uIU/mL Final    Total Cholesterol 03/14/2022 169  0 - 200 mg/dL Final    Triglycerides 03/14/2022 197 (H)  0 - 150 mg/dL Final    HDL Cholesterol 03/14/2022 46  40 - 60 mg/dL Final    LDL Cholesterol  03/14/2022 90  0 - 100 mg/dL Final    VLDL Cholesterol 03/14/2022 33  5 - 40 mg/dL Final    LDL/HDL Ratio 03/14/2022 1.82   Final    25 Hydroxy, Vitamin D 03/14/2022 44.1  30.0 - 100.0 ng/ml Final    WBC 03/14/2022 8.03  3.40 - 10.80 10*3/mm3 Final    RBC 03/14/2022 4.67  3.77 - 5.28 10*6/mm3 Final    Hemoglobin 03/14/2022 12.6  12.0 -  15.9 g/dL Final    Hematocrit 03/14/2022 38.7  34.0 - 46.6 % Final    MCV 03/14/2022 82.9  79.0 - 97.0 fL Final    MCH 03/14/2022 27.0  26.6 - 33.0 pg Final    MCHC 03/14/2022 32.6  31.5 - 35.7 g/dL Final    RDW 03/14/2022 13.2  12.3 - 15.4 % Final    RDW-SD 03/14/2022 39.7  37.0 - 54.0 fl Final    MPV 03/14/2022 11.2  6.0 - 12.0 fL Final    Platelets 03/14/2022 305  140 - 450 10*3/mm3 Final    Neutrophil % 03/14/2022 79.9 (H)  42.7 - 76.0 % Final    Lymphocyte % 03/14/2022 11.2 (L)  19.6 - 45.3 % Final    Monocyte % 03/14/2022 6.6  5.0 - 12.0 % Final    Eosinophil % 03/14/2022 1.4  0.3 - 6.2 % Final    Basophil % 03/14/2022 0.5  0.0 - 1.5 % Final    Immature Grans % 03/14/2022 0.4  0.0 - 0.5 % Final    Neutrophils, Absolute 03/14/2022 6.42  1.70 - 7.00 10*3/mm3 Final    Lymphocytes, Absolute 03/14/2022 0.90  0.70 - 3.10 10*3/mm3 Final    Monocytes, Absolute 03/14/2022 0.53  0.10 - 0.90 10*3/mm3 Final    Eosinophils, Absolute 03/14/2022 0.11  0.00 - 0.40 10*3/mm3 Final    Basophils, Absolute 03/14/2022 0.04  0.00 - 0.20 10*3/mm3 Final    Immature Grans, Absolute 03/14/2022 0.03  0.00 - 0.05 10*3/mm3 Final    nRBC 03/14/2022 0.0  0.0 - 0.2 /100 WBC Final    Amphetamine Screen, Urine 03/14/2022 Negative  Negative Final    AMP INTERNAL CONTROL 03/14/2022 Passed  Passed Final    Barbiturates Screen, Urine 03/14/2022 Negative  Negative Final    BARBITURATE INTERNAL CONTROL 03/14/2022 Passed  Passed Final    Buprenorphine, Screen, Urine 03/14/2022 Negative  Negative Final    BUPRENORPHINE INTERNAL CONTROL 03/14/2022 Passed  Passed Final    Benzodiazepine Screen, Urine 03/14/2022 Negative  Negative Final    BENZODIAZEPINE INTERNAL CONTROL 03/14/2022 Passed  Passed Final    Cocaine Screen, Urine 03/14/2022 Negative  Negative Final    COCAINE INTERNAL CONTROL 03/14/2022 Passed  Passed Final    MDMA (ECSTASY) 03/14/2022 Negative  Negative Final    MDMA (ECSTASY) INTERNAL CONTROL 03/14/2022 Passed  Passed Final     Methamphetamine, Ur 03/14/2022 Negative  Negative Final    METHAMPHETAMINE INTERNAL CONTROL 03/14/2022 Passed  Passed Final    Methadone Screen, Urine 03/14/2022 Negative  Negative Final    METHADONE INTERNAL CONTROL 03/14/2022 Passed  Passed Final    Opiate Screen 03/14/2022 Negative  Negative Final    OPIATES INTERNAL CONTROL 03/14/2022 Passed  Passed Final    Oxycodone Screen, Urine 03/14/2022 Negative  Negative Final    OXYCODONE INTERNAL CONTROL 03/14/2022 Passed  Passed Final    Phencyclidine (PCP), Urine 03/14/2022 Negative  Negative Final    PHENCYCLIDINE INTERNAL CONTROL 03/14/2022 Passed  Passed Final    THC, Screen, Urine 03/14/2022 Negative  Negative Final    THC INTERNAL CONTROL 03/14/2022 Passed  Passed Final    Lot Number 03/14/2022 [id number]   Final    Expiration Date 03/14/2022 3/31/23   Final       EKG Results:  No orders to display       Imaging Results:  No Images in the past 120 days found..      Assessment & Plan   Diagnoses and all orders for this visit:    1. Generalized anxiety disorder (Primary)    2. Mild recurrent major depression    3. Hallucinations    4. Neuropathic pain      Presentation most consistent with developing dementia, likely Lewy body dementia.  Patient also reports worsening gait abnormalities; she fell in the summer of last year, injuring her head, and requiring stitches.  Patient also has a history of depression, possible PTSD by review of chart.  Patient did not mention PTSD during the interview, but she is not a very good historian.  Often times she will say no to a question, and then contradict herself only a few moments later.  She has some trouble with knowing her medications or her medical conditions.    7/25: Behavioral strategies have helped patient. Patient still not getting out of the house, agrees to try at least 1x/wk. Stable, nearly at goal.     Allowed patient to freely discuss and process issues, such as:  Behavioral strategies to manage the  household  Strategies for compliance with meds in the house (Meagan to try to manage the pill box)  's reasons for depression  ... using Tim (Carlos Verdugo) psychotherapeutic techniques including unconditional positive regard, reflective listening, and demonstrating clear empathy.     Time (minutes) spent providing supportive psychotherapy: 20    Patient given education on medication side effects, diagnosis/illness and relapse symptoms. Plan to continue supportive psychotherapy in next appointment to provide symptom relief.  Diagnoses: as above  Symptoms: as above  Functional status: mild impairment  Mental Status Exam: as above    Treatment plan: Medication management and supportive psychotherapy  Prognosis: good  Progress: improved  2 mos        6/20: Stable, well. Discussed behavioral strategies to manage 's behavior. 18   Progress: stable, well  4 wks, virtual        5/1: Stable. Couldn't figure out Twilio. Short visit. See back in person in 6 wks to discuss strategies for mx behavioral concerns.         1/31: Seroquel dose in the afternoon helps, but the real problem is a situational stressor that cannot be solved with the medication.  We brainstormed possible ideas regarding how to manage patient's 's behavior.  We discussed having him accompany patient to her appointments to develop familiarity with me in order for me to potentially take over her medications.  They do not think that will help.  17 Progress: Improved, not a goal  3 months      11/30: Buspar not helpful. Stop. Still anxious and depressed at 3 - 7 pm, strangely. Target with a dose of seroquel.  Can also try increasing frequency of gabapentin, low-dose trazodone.  16 minutes of supportive psychotherapy  Prognosis: good  Progress: residual dep and anx  2 mos      8/31: Pt's anxiety comes from taking care of her father, per Meagan. He's dealing with depression. Meagan is stressed. Discussed getting more help to take care of  pt and her father. Pt is better. No changes to meds. 20 minutes of supportive psychotherapy  Treatment plan: Medication management and supportive psychotherapy  Prognosis: good  Progress: improving  3 mos      7/19: Increase effexor to target residual anx and dep.  Consider increasing BuSpar soon at next appointment.  17 minutes of supportive psychotherapy Treatment plan: Medication management and supportive psychotherapy  Prognosis: Good  Progress: Worsened anxiety and depression  6 weeks    4/19: Patient is well and right where she should be.  7 minutes of supportive psychotherapy3 months    3/8: Target the feeling of anxiety at 4 PM by starting BuSpar at 3 PM.  Consider switching to gabapentin if BuSpar does not help.  Would start at a low dose of 100 mg.  16 minutes of supportive psychotherapy5 weeks    11/8: Depressed in the mornings. Light box, increase venlafaxine. 17 minutes of supportive psychotherapy  8 wks      9/7: Doing even better.  8 weeks.  No changes.    7/27: Markedly improved, and far more linear.  Still has residual anxiety and depression.  Hallucinations are still present but they do not cause distress at all to the patient.  Continue Seroquel at the present dose.  Double the dose of venlafaxine. See back in 6 weeks.  This was also discussed with Meagan, patient's daughter.    Previous:  Caution again advised in terms of increasing the dose given the likely sensitivity she has to antipsychotics.  Psychotherapy is deferred at this time.  S/p referral to neurologist is appropriate for further management.     Visit Diagnoses:    ICD-10-CM ICD-9-CM   1. Generalized anxiety disorder  F41.1 300.02   2. Mild recurrent major depression  F33.0 296.31   3. Hallucinations  R44.3 780.1   4. Neuropathic pain  M79.2 729.2       PLAN:  Risk Assessment: Risk of self-harm acutely is low. Risk factors include anxiety disorder, depressive disorder, access to weapons, recent psychosocial stressors (pandemic).  Protective factors include no family history, no present SI, no history of suicide attempts or self-harm in the past, no access to weapons, no AODA, healthcare seeking, future orientation, willingness to engage in care. Risk of self-harm chronically is also low, but could be further elevated in the event of treatment noncompliance and/or AODA.  Safety: No acute safety concerns.  Medications:   CONTINUE quetiapine 50 mg nightly, 25 mg q2pm. Risks, benefits, alternatives discussed with patient including nausea and vomiting, GI upset, sedation, akathisia, hypotension, increased appetite, lowering of seizure threshold, theoretical risk of tardive dyskinesia, extrapyramidal symptoms, restless legs syndrome. After discussion of these risks and benefits, the patient voiced understanding and agreed to proceed.  CONTINUE venlafaxine 150 mg PO QDAY. Risks, benefits, alternatives discussed with patient including GI upset, nausea vomiting diarrhea, theoretical decrease of seizure threshold predisposing the patient to a slightly higher seizure risk, headaches, sexual dysfunction, serotonin syndrome, bleeding risk, increased suicidality in patients 24 years and younger.  After discussion of these risks and benefits, the patient voiced understanding and agreed to proceed.  CONTINUE with gabapentin 300 twice daily. Risks, benefits, alternatives discussed with patient including sedation, dizziness/falls risk, GI upset, weight gain.  After discussion of these risks and benefits, the patient voiced understanding and agreed to proceed. UDS ordered, Mehdi reviewed.  S/P:  BuSpar 10 mg at 3 PM daily. Ineffective.  sertraline 100 mg daily.  Not effective.  Therapy: Deferred.  Other: s/p referral to neurology for workup of likely dementia.      TREATMENT PLAN/GOALS: Continue supportive psychotherapy efforts and medications as indicated. Treatment and medication options discussed during today's visit. Patient ackowledged and verbally  consented to continue with current treatment plan and was educated on the importance of compliance with treatment and follow-up appointments.    MEDICATION ISSUES:  ERNESTINA reviewed as expected.  Discussed medication options and treatment plan of prescribed medication as well as the risks, benefits, and side effects including potential falls, possible impaired driving and metabolic adversities among others. Patient is agreeable to call the office with any worsening of symptoms or onset of side effects. Patient is agreeable to call 911 or go to the nearest ER should he/she begin having SI/HI. No medication side effects or related complaints today.     MEDS ORDERED DURING VISIT:  No orders of the defined types were placed in this encounter.      Return in about 2 months (around 9/25/2023).         This document has been electronically signed by Riki Peterson MD  July 25, 2023 18:59 EDT      Part of this note may be an electronic transcription/translation of spoken language to printed text using the Dragon Dictation System.

## 2023-07-26 ENCOUNTER — TELEPHONE (OUTPATIENT)
Dept: PSYCHIATRY | Facility: CLINIC | Age: 73
End: 2023-07-26
Payer: MEDICARE

## 2023-08-11 RX ORDER — OMEPRAZOLE 40 MG/1
CAPSULE, DELAYED RELEASE ORAL
Qty: 30 CAPSULE | Refills: 0 | Status: SHIPPED | OUTPATIENT
Start: 2023-08-11

## 2023-08-11 RX ORDER — OMEPRAZOLE 40 MG/1
CAPSULE, DELAYED RELEASE ORAL
Qty: 90 CAPSULE | Refills: 1 | OUTPATIENT
Start: 2023-08-11

## 2023-08-20 DIAGNOSIS — M79.2 NEUROPATHIC PAIN: ICD-10-CM

## 2023-08-21 DIAGNOSIS — F41.1 GENERALIZED ANXIETY DISORDER: ICD-10-CM

## 2023-08-21 DIAGNOSIS — F33.1 MAJOR DEPRESSIVE DISORDER, RECURRENT EPISODE, MODERATE: ICD-10-CM

## 2023-08-21 RX ORDER — VENLAFAXINE HYDROCHLORIDE 150 MG/1
CAPSULE, EXTENDED RELEASE ORAL
Qty: 90 CAPSULE | Refills: 1 | Status: SHIPPED | OUTPATIENT
Start: 2023-08-21

## 2023-08-21 RX ORDER — GABAPENTIN 300 MG/1
CAPSULE ORAL
Qty: 60 CAPSULE | Refills: 5 | Status: SHIPPED | OUTPATIENT
Start: 2023-08-21

## 2023-08-21 NOTE — TELEPHONE ENCOUNTER
Medication refill request.     venlafaxine XR (EFFEXOR-XR) 150 MG 24 hr capsule (01/31/2023)     Pt has upcoming appt  Appointment with Riki Peterson MD (09/27/2023)     Medication order pended.

## 2023-08-21 NOTE — TELEPHONE ENCOUNTER
Medication refill request.     gabapentin (NEURONTIN) 300 MG capsule (02/16/2023)     Pt has upcoming appt  Appointment with Riki Peterson MD (09/27/2023)     Medication order pended.

## 2023-09-11 RX ORDER — OMEPRAZOLE 40 MG/1
CAPSULE, DELAYED RELEASE ORAL
Qty: 30 CAPSULE | Refills: 0 | OUTPATIENT
Start: 2023-09-11

## 2023-09-12 NOTE — TELEPHONE ENCOUNTER
Left message for patient that she would have to schedule an apt before anymore medications can be filled

## 2023-09-14 ENCOUNTER — TELEPHONE (OUTPATIENT)
Dept: INTERNAL MEDICINE | Facility: CLINIC | Age: 73
End: 2023-09-14
Payer: MEDICARE

## 2023-09-14 RX ORDER — ATORVASTATIN CALCIUM 40 MG/1
40 TABLET, FILM COATED ORAL DAILY
Qty: 90 TABLET | Refills: 1 | Status: SHIPPED | OUTPATIENT
Start: 2023-09-14

## 2023-09-14 RX ORDER — OMEPRAZOLE 40 MG/1
40 CAPSULE, DELAYED RELEASE ORAL DAILY
Qty: 90 CAPSULE | Refills: 1 | Status: SHIPPED | OUTPATIENT
Start: 2023-09-14

## 2023-09-14 RX ORDER — LISINOPRIL 10 MG/1
10 TABLET ORAL DAILY
Qty: 90 TABLET | Refills: 1 | Status: SHIPPED | OUTPATIENT
Start: 2023-09-14

## 2023-09-14 NOTE — TELEPHONE ENCOUNTER
DELETE AFTER REVIEWING: Send the encounter HIGH priority, If patient has less than a 3 day supply. If the patient will run out of medication over the weekend add that information to the additional details line. Send this encounter to the clinical pool.    Caller: NILDA AGUIRRE    Relationship: Child    Best call back number: [phone]     Requested Prescriptions:   Requested Prescriptions     Pending Prescriptions Disp Refills    omeprazole (priLOSEC) 40 MG capsule 30 capsule 0     Sig: Take 1 capsule by mouth Daily.    metoprolol tartrate (LOPRESSOR) 25 MG tablet 60 tablet 0     Sig: TAKE 2 TABLETS BY MOUTH EVERY DAY    atorvastatin (LIPITOR) 40 MG tablet 90 tablet 1     Sig: Take 1 tablet by mouth Daily.    lisinopril (PRINIVIL,ZESTRIL) 10 MG tablet 90 tablet 1     Sig: Take 1 tablet by mouth Daily.        Pharmacy where request should be sent: [facility] #[id number] - [place], [place] - [address] [facility] - [phone]  - [phone] FX     Last office visit with prescribing clinician: 3/14/2022   Last telemedicine visit with prescribing clinician: Visit date not found   Next office visit with prescribing clinician: 9/28/2023     Additional details provided by patient: NEEDS REFILLS UNTIL APPOINTMENT ON SEPTEMBER 28TH 2023    Does the patient have less than a 3 day supply:  [] Yes  [x] No    Would you like a call back once the refill request has been completed: [] Yes [x] No    If the office needs to give you a call back, can they leave a voicemail: [] Yes [x] No    Erin Rizzo, PCT   09/14/23 09:53 EDT

## 2023-09-27 ENCOUNTER — TELEMEDICINE (OUTPATIENT)
Dept: PSYCHIATRY | Facility: CLINIC | Age: 73
End: 2023-09-27
Payer: MEDICARE

## 2023-09-27 DIAGNOSIS — R68.89 FORGETFULNESS: ICD-10-CM

## 2023-09-27 DIAGNOSIS — R44.3 HALLUCINATIONS: ICD-10-CM

## 2023-09-27 DIAGNOSIS — F33.0 MILD RECURRENT MAJOR DEPRESSION: ICD-10-CM

## 2023-09-27 DIAGNOSIS — F41.1 GENERALIZED ANXIETY DISORDER: Primary | ICD-10-CM

## 2023-09-27 DIAGNOSIS — M79.2 NEUROPATHIC PAIN: ICD-10-CM

## 2023-09-27 DIAGNOSIS — R06.83 SNORING: ICD-10-CM

## 2023-09-27 NOTE — PATIENT INSTRUCTIONS
1.  Please return to clinic at your next scheduled visit.  Contact the clinic ([phone]) at least 24 hours prior in the event you need to cancel.  2.  Do no harm to yourself or others.    3.  Avoid alcohol and drugs.    4.  Take all medications as prescribed.  Please contact the clinic with any concerns. If you are in need of medication refills, please call the clinic at [phone].    5. Should you want to get in touch with your provider, Dr. Riki Peterson, please utilize [employer] or contact the office ([phone]), and staff will be able to page Dr. Peterson directly.  6.  In the event you have personal crisis, contact the following crisis numbers: Suicide Prevention Hotline 1-939.316.3762; TATE Helpline 2-781-142-TATE; [facility] Emergency Room [phone]; text HELLO to 755260; or 330.

## 2023-09-27 NOTE — PROGRESS NOTES
"Subjective   Payal Singer is a 73 y.o. female who presents today for follow up    Chief Complaint:  mdd meagan    History of Present Illness:     Chart review 4/15: Seen 3/30 to establish care. Hx of anx/dep avh in [place]. on both sertraline and venlafaxine, was on Seroquel. Hx of HTN, GERD. On gabapentin 600 tid, sertraline 200 d, venla 150 d, quetiap 25 qhs. 2021 labs: cbc shows low mch, high rdw; Cr 1.15 high, abnormal lipids, TSH and fT4 wnl. No OP head imaging, ekg.     \"Payal\" and \"Meagan\"      : In person interview:  Chart review:   No visits.  no new  Plannin/25: Behavioral strategies have helped patient. Patient still not getting out of the house, agrees to try at least 1x/wk. Stable, nearly at goal.   Stable, well. Discussed behavioral strategies to manage 's behavior.  Calming:  Children or friends calling on the phone  Meals available (food)  snacks  \"I'm good.\"  : \"he's fair. He can't do anything anymore.\" Wheelchair bound for the last 2-3 months. Due to falls.  Meagan: \"they helped a little.\" The food helped. The meds help.  Mood/Depression: good mood  Anxiety: stable worrying  Panic attacks: n  Energy: baseline low  Concentration: baseline low  Sleeping: maintenance insomnia x1 year (told me today)  Eating: stable  Refills: y  Substances: def  Therapy: n  Medication compliant: y  SE: n  No SI HI AVH.      : In person interview:  Chart review: no new  Planning: Stable, well. Discussed behavioral strategies to manage 's behavior.  Calming:  Children or friends calling on the phone  Meals available (food)  snacks  \"We did a different menu.\"  \"He just seems like he's feeling better.\"  Meagan: \"they helped a little.\" The food helped. The meds help.  Mood/Depression: good mood  Anxiety: less anxious, on ege  Panic attacks: n  Energy: baseline low  Concentration: baseline low  Sleeping: well  Eating: stable  Refills: y  Substances: def  Therapy: n  Medication compliant: " "y  SE: n  No SI HI AVH.        6/20: In person.  Interview:  Chart review: no new.  Planning: Stable. Couldn't figure out Twilio. Short visit. See back in person in 6 wks to discuss strategies for mx behavioral concerns.   \"Yeah, it's my .\"  He yells at me.  Triggers:   Moving helped. More space.   may have PTSD.  He may be medication noncompliant.  Calming:  Children or friends calling on the phone  Meals available (food)  snacks  Mood/Depression: I'm doing ok  Anxiety: good, stable  Panic attacks: n  Energy: baseline down  Concentration: baseline down  Sleeping: fairly well  Eating: stable  Refills: y  Substances: n  Therapy: n  Medication compliant: y  SE: n  No SI HI AVH.        5/1: Virtual visit via Zoom audio and video due to the COVID-19 pandemic.  Patient is accepting of and agreeable to visit.  The visit consisted of the patient and I. The patient is at home, and I am at the office.  Interview:  Chart review: No new.  Planning: Seroquel dose in the afternoon helps, but the real problem is a situational stressor that cannot be solved with the medication.  We brainstormed possible ideas regarding how to manage patient's 's behavior.  We discussed having him accompany patient to her appointments to develop familiarity with me in order for me to potentially take over his medications.  They do not think that will help.   \"I've been pretty good.\"  We moved to another house this Saturday.  More room, but 's behavior hasn't changed. It has a basement.  Meagan: we should set up an appnt in person.  They are both in the walkout basement. They can't take stairs. They are around each constantly.  In person: would be more open.  Mood/Depression: stable  Anxiety: stable  Panic attacks: n  Energy: stable   Concentration: stable  Sleeping: well  Eating: stable weight  Refills: n  Substances: n  Therapy: n  Medication compliant: y  SE:   No SI HI AVH.        1/31: Virtual visit via Zoom audio " "and video due to the COVID-19 pandemic.  Patient is accepting of and agreeable to visit.  The visit consisted of the patient and I. The patient is at home, and I am at the office.  Interview:  Chart review: No new.  Planning: Buspar not helpful. Stop. Still anxious and depressed at 3 - 7 pm, strangely. [employer] with a dose of seroquel.  Can also try increasing frequency of gabapentin, or low-dose trazodone.   \"Been going fairly good.\"  It has helped (seroquel at 2 pm).   Still has anxiety/dep b/w 3-7 pm. \"Have had it since I was in [employer].\"  Not really sure why.  6th grade, would have crying spells and would have to come home from school.  We have problems at the house, .  Really due to  getting upset around that time.  He refuses to take his meds  Situation cannot be avoided  He has been confronted by Meagan, but it has not worked  Mood/Depression: stable  Anxiety: not as upset by problems as before  Panic attacks: n  Energy: stable  Concentration: stable  Sleeping: well  Eating: stable weight  Refills: y  Substances: n  Therapy: n  Medication compliant: y  No SI HI AVH.    ...      Previous:  Still having panic attacks once a week. Sx: soa, sweating, palpitations, tachycardia, fear, no derealization/depersonalization, last 30 min, leaves room where she was at. No triggers. Fear of another panic attack happening.     Anxiety due to 's health; admitted to hospital recently for dehydration. Notes uncontrolled worrying, muscle tension, irritability, restlessness, trouble sleeping - will wake at 3 am and can't go back to sleep. Duration is at least 6 months, since moved here.    Still having hallucinations every night before she goes to bed. Sees shadows walk past bed. No AH. Sometimes sees spiders during the day on the floor; happens once or twice a week.     Seroquel helped to some extent.     Again, patient is somewhat of a poor historian. Often times she will answer a question with \"no,\" and " then contradict her self moments later.    Collateral from Meagan: patient did have a UTI (UA confirmed); was put on a course of abx. Feels her mom's confusion has improved since then.      Past Surgical History:  Past Surgical History:   Procedure Laterality Date    CATARACT EXTRACTION  2002    CHOLECYSTECTOMY OPEN  1971       Problem List:  Patient Active Problem List   Diagnosis    Forgetfulness    Anxiety    Depression    Esophageal reflux    Hallucinations    Head injury    Hypertension    Other acute sinusitis    PTSD (post-traumatic stress disorder)    Mixed hyperlipidemia    Acquired hypothyroidism    Vitamin D deficiency    Neuropathic pain       Allergy:   Allergies   Allergen Reactions    Floxin Otic [Ofloxacin] Rash        Discontinued Medications:  There are no discontinued medications.    Current Medications:   Current Outpatient Medications   Medication Sig Dispense Refill    acetaminophen (TYLENOL) 500 MG tablet Take 1 tablet by mouth Every 6 (Six) Hours As Needed for Mild Pain.      Ascorbic Acid (Vitamin C) 250 MG chewable tablet Chew. (Patient not taking: Reported on 6/20/2023)      aspirin 81 MG EC tablet aspirin 81 mg oral tablet,delayed release (DR/EC) take 1 tablet (81 mg) by oral route once daily   Active      atorvastatin (LIPITOR) 40 MG tablet Take 1 tablet by mouth Daily. 90 tablet 1    gabapentin (NEURONTIN) 300 MG capsule TAKE 1 CAPSULE BY MOUTH TWICE A DAY 60 capsule 5    levothyroxine (SYNTHROID, LEVOTHROID) 75 MCG tablet TAKE 1 TABLET BY MOUTH EVERY DAY 90 tablet 1    lisinopril (PRINIVIL,ZESTRIL) 10 MG tablet Take 1 tablet by mouth Daily. 90 tablet 1    metoprolol tartrate (LOPRESSOR) 25 MG tablet Take 2 tablets by mouth Daily for 90 days. TAKE 2 TABLETS BY MOUTH EVERY  tablet 0    multivitamin with minerals tablet tablet Take 1 tablet by mouth Daily. (Patient not taking: Reported on 6/20/2023)      omeprazole (priLOSEC) 40 MG capsule Take 1 capsule by mouth Daily. 90 capsule  "1    QUEtiapine (SEROquel) 25 MG tablet 25 MG (1 TAB) AT 2PM, 50 MG (2 TAB) NIGHTLY 180 tablet 3    venlafaxine XR (EFFEXOR-XR) 150 MG 24 hr capsule TAKE 1 CAPSULE BY MOUTH EVERY DAY 90 capsule 1    vitamin D (ERGOCALCIFEROL) 1.25 MG (64824 UT) capsule capsule TAKE 1 CAPSULE BY MOUTH 1 TIME PER WEEK. 13 capsule 1    Zinc Sulfate (ZINC 15 PO) Take  by mouth. (Patient not taking: Reported on 6/20/2023)       No current facility-administered medications for this visit.       Past Medical History:  Past Medical History:   Diagnosis Date    Acid reflux     Allergies     Anxiety     Depression     Forgetfulness     Hallucinations 04/15/2021    Head injury     HTN (hypertension)     PTSD (post-traumatic stress disorder)          Social History     Socioeconomic History    Marital status: Unknown   Tobacco Use    Smoking status: Never    Smokeless tobacco: Never   Vaping Use    Vaping Use: Never used   Substance and Sexual Activity    Alcohol use: Never    Drug use: Never    Sexual activity: Not Currently         Family History   Problem Relation Age of Onset    Stroke Mother     Heart disease Mother     Stroke Father     Heart disease Father     Diabetes Father     Stroke Sister     Diabetes Sister     Stroke Brother     Diabetes Brother     Diabetes Other         AUNT/UNCLE    Anxiety disorder Other        Mental Status Exam:   Hygiene:   good, at home  Cooperation:  Cooperative  Eye Contact:  Good  Psychomotor Behavior:  Appropriate  Affect:  Appropriate, mood congruent  Mood: \"I'm ok, I just have trouble sleeping\"  Speech:  Normal  Thought Process:  Goal directed  Thought Content:  Normal and Mood congruent  Suicidal:  None  Homicidal:  None  Hallucinations:  None  Delusion:  None  Memory:  Deficits  Orientation:  Grossly intact  Reliability:  fair  Insight:  Fair  Judgement:  Fair  Impulse Control:  Fair  Physical/Medical Issues:  No      Review of Systems:  Review of Systems   Constitutional:  Positive for fatigue. " Negative for diaphoresis.   HENT:  Negative for drooling.    Eyes:  Negative for visual disturbance.   Respiratory:  Positive for cough. Negative for shortness of breath.    Cardiovascular:  Negative for chest pain, palpitations and leg swelling.   Gastrointestinal:  Negative for diarrhea, nausea and vomiting.   Endocrine: Negative for cold intolerance and heat intolerance.   Genitourinary:  Negative for difficulty urinating.   Musculoskeletal:  Negative for joint swelling.   Allergic/Immunologic: Negative for immunocompromised state.   Neurological:  Positive for light-headedness. Negative for dizziness, seizures, syncope, speech difficulty, numbness and headaches.   Hematological:  Negative for adenopathy.   Psychiatric/Behavioral:  Positive for sleep disturbance.        Physical Exam:  Physical Exam    Vital Signs:   There were no vitals taken for this visit.     Lab Results:   No visits with results within 6 Month(s) from this visit.   Latest known visit with results is:   Office Visit on 03/14/2022   Component Date Value Ref Range Status    Glucose 03/14/2022 94  65 - 99 mg/dL Final    BUN 03/14/2022 15  8 - 23 mg/dL Final    Creatinine 03/14/2022 1.09 (H)  0.57 - 1.00 mg/dL Final    Sodium 03/14/2022 142  136 - 145 mmol/L Final    Potassium 03/14/2022 3.8  3.5 - 5.2 mmol/L Final    Chloride 03/14/2022 102  98 - 107 mmol/L Final    CO2 03/14/2022 28.0  22.0 - 29.0 mmol/L Final    Calcium 03/14/2022 9.7  8.6 - 10.5 mg/dL Final    Total Protein 03/14/2022 7.6  6.0 - 8.5 g/dL Final    Albumin 03/14/2022 4.10  3.50 - 5.20 g/dL Final    ALT (SGPT) 03/14/2022 13  1 - 33 U/L Final    AST (SGOT) 03/14/2022 15  1 - 32 U/L Final    Alkaline Phosphatase 03/14/2022 125 (H)  39 - 117 U/L Final    Total Bilirubin 03/14/2022 0.2  0.0 - 1.2 mg/dL Final    Globulin 03/14/2022 3.5  gm/dL Final    A/G Ratio 03/14/2022 1.2  g/dL Final    BUN/Creatinine Ratio 03/14/2022 13.8  7.0 - 25.0 Final    Anion Gap 03/14/2022 12.0  5.0 -  15.0 mmol/L Final    eGFR 03/14/2022 53.8 (L)  >60.0 mL/min/1.73 Final    National Kidney Foundation and American Society of Nephrology (ASN) Task Force recommended calculation based on the Chronic Kidney Disease Epidemiology Collaboration (CKD-EPI) equation refit without adjustment for race.    TSH 03/14/2022 1.040  0.270 - 4.200 uIU/mL Final    Total Cholesterol 03/14/2022 169  0 - 200 mg/dL Final    Triglycerides 03/14/2022 197 (H)  0 - 150 mg/dL Final    HDL Cholesterol 03/14/2022 46  40 - 60 mg/dL Final    LDL Cholesterol  03/14/2022 90  0 - 100 mg/dL Final    VLDL Cholesterol 03/14/2022 33  5 - 40 mg/dL Final    LDL/HDL Ratio 03/14/2022 1.82   Final    25 Hydroxy, Vitamin D 03/14/2022 44.1  30.0 - 100.0 ng/ml Final    WBC 03/14/2022 8.03  3.40 - 10.80 10*3/mm3 Final    RBC 03/14/2022 4.67  3.77 - 5.28 10*6/mm3 Final    Hemoglobin 03/14/2022 12.6  12.0 - 15.9 g/dL Final    Hematocrit 03/14/2022 38.7  34.0 - 46.6 % Final    MCV 03/14/2022 82.9  79.0 - 97.0 fL Final    MCH 03/14/2022 27.0  26.6 - 33.0 pg Final    MCHC 03/14/2022 32.6  31.5 - 35.7 g/dL Final    RDW 03/14/2022 13.2  12.3 - 15.4 % Final    RDW-SD 03/14/2022 39.7  37.0 - 54.0 fl Final    MPV 03/14/2022 11.2  6.0 - 12.0 fL Final    Platelets 03/14/2022 305  140 - 450 10*3/mm3 Final    Neutrophil % 03/14/2022 79.9 (H)  42.7 - 76.0 % Final    Lymphocyte % 03/14/2022 11.2 (L)  19.6 - 45.3 % Final    Monocyte % 03/14/2022 6.6  5.0 - 12.0 % Final    Eosinophil % 03/14/2022 1.4  0.3 - 6.2 % Final    Basophil % 03/14/2022 0.5  0.0 - 1.5 % Final    Immature Grans % 03/14/2022 0.4  0.0 - 0.5 % Final    Neutrophils, Absolute 03/14/2022 6.42  1.70 - 7.00 10*3/mm3 Final    Lymphocytes, Absolute 03/14/2022 0.90  0.70 - 3.10 10*3/mm3 Final    Monocytes, Absolute 03/14/2022 0.53  0.10 - 0.90 10*3/mm3 Final    Eosinophils, Absolute 03/14/2022 0.11  0.00 - 0.40 10*3/mm3 Final    Basophils, Absolute 03/14/2022 0.04  0.00 - 0.20 10*3/mm3 Final    Immature Grans,  Absolute 03/14/2022 0.03  0.00 - 0.05 10*3/mm3 Final    nRBC 03/14/2022 0.0  0.0 - 0.2 /100 WBC Final    Amphetamine Screen, Urine 03/14/2022 Negative  Negative Final    AMP INTERNAL CONTROL 03/14/2022 Passed  Passed Final    Barbiturates Screen, Urine 03/14/2022 Negative  Negative Final    BARBITURATE INTERNAL CONTROL 03/14/2022 Passed  Passed Final    Buprenorphine, Screen, Urine 03/14/2022 Negative  Negative Final    BUPRENORPHINE INTERNAL CONTROL 03/14/2022 Passed  Passed Final    Benzodiazepine Screen, Urine 03/14/2022 Negative  Negative Final    BENZODIAZEPINE INTERNAL CONTROL 03/14/2022 Passed  Passed Final    Cocaine Screen, Urine 03/14/2022 Negative  Negative Final    COCAINE INTERNAL CONTROL 03/14/2022 Passed  Passed Final    MDMA (ECSTASY) 03/14/2022 Negative  Negative Final    MDMA (ECSTASY) INTERNAL CONTROL 03/14/2022 Passed  Passed Final    Methamphetamine, Ur 03/14/2022 Negative  Negative Final    METHAMPHETAMINE INTERNAL CONTROL 03/14/2022 Passed  Passed Final    Methadone Screen, Urine 03/14/2022 Negative  Negative Final    METHADONE INTERNAL CONTROL 03/14/2022 Passed  Passed Final    Opiate Screen 03/14/2022 Negative  Negative Final    OPIATES INTERNAL CONTROL 03/14/2022 Passed  Passed Final    Oxycodone Screen, Urine 03/14/2022 Negative  Negative Final    OXYCODONE INTERNAL CONTROL 03/14/2022 Passed  Passed Final    Phencyclidine (PCP), Urine 03/14/2022 Negative  Negative Final    PHENCYCLIDINE INTERNAL CONTROL 03/14/2022 Passed  Passed Final    THC, Screen, Urine 03/14/2022 Negative  Negative Final    THC INTERNAL CONTROL 03/14/2022 Passed  Passed Final    Lot Number 03/14/2022 [id number]   Final    Expiration Date 03/14/2022 3/31/23   Final       EKG Results:  No orders to display       Imaging Results:  No Images in the past 120 days found..      Assessment & Plan   Diagnoses and all orders for this visit:    1. Generalized anxiety disorder (Primary)    2. Neuropathic pain    3.  Hallucinations    4. Forgetfulness    5. Mild recurrent major depression    6. Snoring  -     Ambulatory Referral to Sleep Medicine      Presentation most consistent with developing dementia, likely Lewy body dementia.  Patient also reports worsening gait abnormalities; she fell in the summer of last year, injuring her head, and requiring stitches.  Patient also has a history of depression, possible PTSD by review of chart.  Patient did not mention PTSD during the interview, but she is not a very good historian.  Often times she will say no to a question, and then contradict herself only a few moments later.  She has some trouble with knowing her medications or her medical conditions.      9/27: Trouble snoring, maintenance insomnia. Sleep study. Start melatonin. Fear of spiders getting on the bed, but no AVH (but history of them).    Allowed patient to freely discuss and process issues, such as:  Her history of AVH 2 years ago; none now.  Behavioral strategies to manage the household  Strategies for compliance with meds in the house (Meagan to try to manage the pill box)  ... using Tim (Carlos Verdugo) psychotherapeutic techniques including unconditional positive regard, reflective listening, and demonstrating clear empathy.     Time (minutes) spent providing supportive psychotherapy: 20    Patient given education on medication side effects, diagnosis/illness and relapse symptoms. Plan to continue supportive psychotherapy in next appointment to provide symptom relief.  Diagnoses: as above  Symptoms: as above  Functional status: mild impairment  Mental Status Exam: as above    Treatment plan: Medication management and supportive psychotherapy  Prognosis: good  Progress: stable, just insomnia  4w        7/25: Behavioral strategies have helped patient. Patient still not getting out of the house, agrees to try at least 1x/wk. Stable, nearly at goal.   Progress: improved  2 mos        6/20: Stable, well. Discussed  behavioral strategies to manage 's behavior. 18   Progress: stable, well  4 wks, virtual        5/1: Stable. Couldn't figure out Twilio. Short visit. See back in person in 6 wks to discuss strategies for mx behavioral concerns.         1/31: Seroquel dose in the afternoon helps, but the real problem is a situational stressor that cannot be solved with the medication.  We brainstormed possible ideas regarding how to manage patient's 's behavior.  We discussed having him accompany patient to her appointments to develop familiarity with me in order for me to potentially take over her medications.  They do not think that will help.  17 Progress: Improved, not a goal  3 months      11/30: Buspar not helpful. Stop. Still anxious and depressed at 3 - 7 pm, strangely. Target with a dose of seroquel.  Can also try increasing frequency of gabapentin, low-dose trazodone.  16 minutes of supportive psychotherapy  Prognosis: good  Progress: residual dep and anx  2 mos      8/31: Pt's anxiety comes from taking care of her father, per Meagan. He's dealing with depression. Meagan is stressed. Discussed getting more help to take care of pt and her father. Pt is better. No changes to meds. 20 minutes of supportive psychotherapy  Treatment plan: Medication management and supportive psychotherapy  Prognosis: good  Progress: improving  3 mos      7/19: Increase effexor to target residual anx and dep.  Consider increasing BuSpar soon at next appointment.  17 minutes of supportive psychotherapy Treatment plan: Medication management and supportive psychotherapy  Prognosis: Good  Progress: Worsened anxiety and depression  6 weeks    4/19: Patient is well and right where she should be.  7 minutes of supportive psychotherapy3 months    3/8: Target the feeling of anxiety at 4 PM by starting BuSpar at 3 PM.  Consider switching to gabapentin if BuSpar does not help.  Would start at a low dose of 100 mg.  16 minutes of supportive  psychotherapy5 weeks    11/8: Depressed in the mornings. Light box, increase venlafaxine. 17 minutes of supportive psychotherapy  8 wks      9/7: Doing even better.  8 weeks.  No changes.    7/27: Markedly improved, and far more linear.  Still has residual anxiety and depression.  Hallucinations are still present but they do not cause distress at all to the patient.  Continue Seroquel at the present dose.  Double the dose of venlafaxine. See back in 6 weeks.  This was also discussed with Meagan, patient's daughter.    Previous:  Caution again advised in terms of increasing the dose given the likely sensitivity she has to antipsychotics.  Psychotherapy is deferred at this time.  S/p referral to neurologist is appropriate for further management.     Visit Diagnoses:    ICD-10-CM ICD-9-CM   1. Generalized anxiety disorder  F41.1 300.02   2. Neuropathic pain  M79.2 729.2   3. Hallucinations  R44.3 780.1   4. Forgetfulness  R68.89 780.99   5. Mild recurrent major depression  F33.0 296.31   6. Snoring  R06.83 786.09       PLAN:  Risk Assessment: Risk of self-harm acutely is low. Risk factors include anxiety disorder, depressive disorder, access to weapons, recent psychosocial stressors (pandemic). Protective factors include no family history, no present SI, no history of suicide attempts or self-harm in the past, no access to weapons, no AODA, healthcare seeking, future orientation, willingness to engage in care. Risk of self-harm chronically is also low, but could be further elevated in the event of treatment noncompliance and/or AODA.  Safety: No acute safety concerns.  Medications:   START melatonin 10 mg qhs. Risks, benefits, side effects discussed with patient including sedation, dizziness/falls risk, GI upset.  Do not use before operating vehicle, vessel, or machine. After discussion of these risks and benefits, the patient voiced understanding and agreed to proceed.   CONTINUE quetiapine 50 mg nightly, 25 mg q2pm.  Risks, benefits, alternatives discussed with patient including nausea and vomiting, GI upset, sedation, akathisia, hypotension, increased appetite, lowering of seizure threshold, theoretical risk of tardive dyskinesia, extrapyramidal symptoms, restless legs syndrome. After discussion of these risks and benefits, the patient voiced understanding and agreed to proceed.  CONTINUE venlafaxine 150 mg PO QDAY. Risks, benefits, alternatives discussed with patient including GI upset, nausea vomiting diarrhea, theoretical decrease of seizure threshold predisposing the patient to a slightly higher seizure risk, headaches, sexual dysfunction, serotonin syndrome, bleeding risk, increased suicidality in patients 24 years and younger.  After discussion of these risks and benefits, the patient voiced understanding and agreed to proceed.  CONTINUE with gabapentin 300 twice daily. Risks, benefits, alternatives discussed with patient including sedation, dizziness/falls risk, GI upset, weight gain.  After discussion of these risks and benefits, the patient voiced understanding and agreed to proceed. UDS ordered, Ernestina reviewed.  S/P:  BuSpar 10 mg at 3 PM daily. Ineffective.  sertraline 100 mg daily.  Not effective.  Therapy: Deferred.  Other: s/p referral to neurology for workup of likely dementia.      TREATMENT PLAN/GOALS: Continue supportive psychotherapy efforts and medications as indicated. Treatment and medication options discussed during today's visit. Patient ackowledged and verbally consented to continue with current treatment plan and was educated on the importance of compliance with treatment and follow-up appointments.    MEDICATION ISSUES:  ERNESTINA reviewed as expected.  Discussed medication options and treatment plan of prescribed medication as well as the risks, benefits, and side effects including potential falls, possible impaired driving and metabolic adversities among others. Patient is agreeable to call the office  with any worsening of symptoms or onset of side effects. Patient is agreeable to call 911 or go to the nearest ER should he/she begin having SI/HI. No medication side effects or related complaints today.     MEDS ORDERED DURING VISIT:  No orders of the defined types were placed in this encounter.      Return in about 4 weeks (around 10/25/2023) for Video visit.         This document has been electronically signed by Riki Peterson MD  September 27, 2023 17:17 EDT      Part of this note may be an electronic transcription/translation of spoken language to printed text using the Dragon Dictation System.

## 2023-09-28 ENCOUNTER — OFFICE VISIT (OUTPATIENT)
Dept: INTERNAL MEDICINE | Facility: CLINIC | Age: 73
End: 2023-09-28
Payer: MEDICARE

## 2023-09-28 VITALS
HEART RATE: 86 BPM | WEIGHT: 172.4 LBS | DIASTOLIC BLOOD PRESSURE: 78 MMHG | OXYGEN SATURATION: 96 % | BODY MASS INDEX: 30.55 KG/M2 | HEIGHT: 63 IN | SYSTOLIC BLOOD PRESSURE: 128 MMHG | TEMPERATURE: 97.6 F | RESPIRATION RATE: 18 BRPM

## 2023-09-28 DIAGNOSIS — E78.2 MIXED HYPERLIPIDEMIA: Chronic | ICD-10-CM

## 2023-09-28 DIAGNOSIS — I10 PRIMARY HYPERTENSION: Chronic | ICD-10-CM

## 2023-09-28 DIAGNOSIS — E03.9 ACQUIRED HYPOTHYROIDISM: Chronic | ICD-10-CM

## 2023-09-28 DIAGNOSIS — Z12.11 COLON CANCER SCREENING: ICD-10-CM

## 2023-09-28 DIAGNOSIS — Z00.00 ENCOUNTER FOR ANNUAL WELLNESS EXAM IN MEDICARE PATIENT: Primary | ICD-10-CM

## 2023-09-28 DIAGNOSIS — Z23 NEED FOR PNEUMOCOCCAL VACCINATION: ICD-10-CM

## 2023-09-28 DIAGNOSIS — Z11.59 NEED FOR HEPATITIS C SCREENING TEST: ICD-10-CM

## 2023-09-28 DIAGNOSIS — Z12.31 SCREENING MAMMOGRAM FOR BREAST CANCER: ICD-10-CM

## 2023-09-28 DIAGNOSIS — Z78.0 POST-MENOPAUSAL: ICD-10-CM

## 2023-09-28 DIAGNOSIS — E55.9 VITAMIN D DEFICIENCY: ICD-10-CM

## 2023-09-28 DIAGNOSIS — R68.89 FORGETFULNESS: ICD-10-CM

## 2023-09-28 DIAGNOSIS — Z00.00 ANNUAL PHYSICAL EXAM: ICD-10-CM

## 2023-09-28 LAB
25(OH)D3 SERPL-MCNC: 60.1 NG/ML (ref 30–100)
ALBUMIN SERPL-MCNC: 3.8 G/DL (ref 3.5–5.2)
ALBUMIN/GLOB SERPL: 1.2 G/DL
ALP SERPL-CCNC: 137 U/L (ref 39–117)
ALT SERPL W P-5'-P-CCNC: 7 U/L (ref 1–33)
ANION GAP SERPL CALCULATED.3IONS-SCNC: 9 MMOL/L (ref 5–15)
AST SERPL-CCNC: 12 U/L (ref 1–32)
BASOPHILS # BLD AUTO: 0.03 10*3/MM3 (ref 0–0.2)
BASOPHILS NFR BLD AUTO: 0.4 % (ref 0–1.5)
BILIRUB SERPL-MCNC: 0.4 MG/DL (ref 0–1.2)
BUN SERPL-MCNC: 14 MG/DL (ref 8–23)
BUN/CREAT SERPL: 13.5 (ref 7–25)
CALCIUM SPEC-SCNC: 9 MG/DL (ref 8.6–10.5)
CHLORIDE SERPL-SCNC: 103 MMOL/L (ref 98–107)
CHOLEST SERPL-MCNC: 133 MG/DL (ref 0–200)
CO2 SERPL-SCNC: 28 MMOL/L (ref 22–29)
CREAT SERPL-MCNC: 1.04 MG/DL (ref 0.57–1)
DEPRECATED RDW RBC AUTO: 42.3 FL (ref 37–54)
EGFRCR SERPLBLD CKD-EPI 2021: 56.5 ML/MIN/1.73
EOSINOPHIL # BLD AUTO: 0.08 10*3/MM3 (ref 0–0.4)
EOSINOPHIL NFR BLD AUTO: 1.1 % (ref 0.3–6.2)
ERYTHROCYTE [DISTWIDTH] IN BLOOD BY AUTOMATED COUNT: 13.5 % (ref 12.3–15.4)
GLOBULIN UR ELPH-MCNC: 3.2 GM/DL
GLUCOSE SERPL-MCNC: 103 MG/DL (ref 65–99)
HCT VFR BLD AUTO: 37.7 % (ref 34–46.6)
HCV AB SER DONR QL: NORMAL
HDLC SERPL-MCNC: 46 MG/DL (ref 40–60)
HGB BLD-MCNC: 12.2 G/DL (ref 12–15.9)
IMM GRANULOCYTES # BLD AUTO: 0.04 10*3/MM3 (ref 0–0.05)
IMM GRANULOCYTES NFR BLD AUTO: 0.6 % (ref 0–0.5)
LDLC SERPL CALC-MCNC: 58 MG/DL (ref 0–100)
LDLC/HDLC SERPL: 1.13 {RATIO}
LYMPHOCYTES # BLD AUTO: 0.74 10*3/MM3 (ref 0.7–3.1)
LYMPHOCYTES NFR BLD AUTO: 10.3 % (ref 19.6–45.3)
MCH RBC QN AUTO: 27.6 PG (ref 26.6–33)
MCHC RBC AUTO-ENTMCNC: 32.4 G/DL (ref 31.5–35.7)
MCV RBC AUTO: 85.3 FL (ref 79–97)
MONOCYTES # BLD AUTO: 0.46 10*3/MM3 (ref 0.1–0.9)
MONOCYTES NFR BLD AUTO: 6.4 % (ref 5–12)
NEUTROPHILS NFR BLD AUTO: 5.81 10*3/MM3 (ref 1.7–7)
NEUTROPHILS NFR BLD AUTO: 81.2 % (ref 42.7–76)
NRBC BLD AUTO-RTO: 0 /100 WBC (ref 0–0.2)
PLATELET # BLD AUTO: 292 10*3/MM3 (ref 140–450)
PMV BLD AUTO: 10.4 FL (ref 6–12)
POTASSIUM SERPL-SCNC: 4 MMOL/L (ref 3.5–5.2)
PROT SERPL-MCNC: 7 G/DL (ref 6–8.5)
RBC # BLD AUTO: 4.42 10*6/MM3 (ref 3.77–5.28)
SODIUM SERPL-SCNC: 140 MMOL/L (ref 136–145)
T4 FREE SERPL-MCNC: 1.33 NG/DL (ref 0.93–1.7)
TRIGL SERPL-MCNC: 176 MG/DL (ref 0–150)
TSH SERPL DL<=0.05 MIU/L-ACNC: 0.76 UIU/ML (ref 0.27–4.2)
VIT B12 BLD-MCNC: 368 PG/ML (ref 211–946)
VLDLC SERPL-MCNC: 29 MG/DL (ref 5–40)
WBC NRBC COR # BLD: 7.16 10*3/MM3 (ref 3.4–10.8)

## 2023-09-28 PROCEDURE — 80061 LIPID PANEL: CPT | Performed by: NURSE PRACTITIONER

## 2023-09-28 PROCEDURE — 86592 SYPHILIS TEST NON-TREP QUAL: CPT | Performed by: NURSE PRACTITIONER

## 2023-09-28 PROCEDURE — 86803 HEPATITIS C AB TEST: CPT | Performed by: NURSE PRACTITIONER

## 2023-09-28 PROCEDURE — 84443 ASSAY THYROID STIM HORMONE: CPT | Performed by: NURSE PRACTITIONER

## 2023-09-28 PROCEDURE — 82607 VITAMIN B-12: CPT | Performed by: NURSE PRACTITIONER

## 2023-09-28 PROCEDURE — 85025 COMPLETE CBC W/AUTO DIFF WBC: CPT | Performed by: NURSE PRACTITIONER

## 2023-09-28 PROCEDURE — 80053 COMPREHEN METABOLIC PANEL: CPT | Performed by: NURSE PRACTITIONER

## 2023-09-28 PROCEDURE — 82306 VITAMIN D 25 HYDROXY: CPT | Performed by: NURSE PRACTITIONER

## 2023-09-28 PROCEDURE — 84439 ASSAY OF FREE THYROXINE: CPT | Performed by: NURSE PRACTITIONER

## 2023-09-28 RX ORDER — PHENOL 1.4 %
1 AEROSOL, SPRAY (ML) MUCOUS MEMBRANE NIGHTLY
COMMUNITY

## 2023-09-28 NOTE — PROGRESS NOTES
The ABCs of the Annual Wellness Visit  Subsequent Medicare Wellness Visit    Subjective    Payal Singer is a 73 y.o. female who presents for a Subsequent Medicare Wellness Visit.    The following portions of the patient's history were reviewed and   updated as appropriate: allergies, current medications, past family history, past medical history, past social history, past surgical history, and problem list.    Compared to one year ago, the patient feels her physical   health is the same.    Compared to one year ago, the patient feels her mental   health is the same.    Recent Hospitalizations:  She was not admitted to the hospital during the last year.       Current Medical Providers:  Patient Care Team:  Alyson Marks APRN as PCP - General (Nurse Practitioner)    Outpatient Medications Prior to Visit   Medication Sig Dispense Refill    acetaminophen (TYLENOL) 500 MG tablet Take 1 tablet by mouth Every 6 (Six) Hours As Needed for Mild Pain.      Ascorbic Acid (Vitamin C) 250 MG chewable tablet Chew.      aspirin 81 MG EC tablet aspirin 81 mg oral tablet,delayed release (DR/EC) take 1 tablet (81 mg) by oral route once daily   Active      atorvastatin (LIPITOR) 40 MG tablet Take 1 tablet by mouth Daily. 90 tablet 1    gabapentin (NEURONTIN) 300 MG capsule TAKE 1 CAPSULE BY MOUTH TWICE A DAY 60 capsule 5    levothyroxine (SYNTHROID, LEVOTHROID) 75 MCG tablet TAKE 1 TABLET BY MOUTH EVERY DAY 90 tablet 1    lisinopril (PRINIVIL,ZESTRIL) 10 MG tablet Take 1 tablet by mouth Daily. 90 tablet 1    Melatonin 10 MG tablet Take 1 tablet by mouth Every Night.      metoprolol tartrate (LOPRESSOR) 25 MG tablet Take 2 tablets by mouth Daily for 90 days. TAKE 2 TABLETS BY MOUTH EVERY  tablet 0    omeprazole (priLOSEC) 40 MG capsule Take 1 capsule by mouth Daily. 90 capsule 1    QUEtiapine (SEROquel) 25 MG tablet 25 MG (1 TAB) AT 2PM, 50 MG (2 TAB) NIGHTLY 180 tablet 3    venlafaxine XR (EFFEXOR-XR) 150 MG 24 hr capsule TAKE  "1 CAPSULE BY MOUTH EVERY DAY 90 capsule 1    vitamin D (ERGOCALCIFEROL) 1.25 MG (53084 UT) capsule capsule TAKE 1 CAPSULE BY MOUTH 1 TIME PER WEEK. 13 capsule 1    Zinc Sulfate (ZINC 15 PO) Take  by mouth.      multivitamin with minerals tablet tablet Take 1 tablet by mouth Daily.       No facility-administered medications prior to visit.       No opioid medication identified on active medication list. I have reviewed chart for other potential  high risk medication/s and harmful drug interactions in the elderly.        Aspirin is on active medication list. Aspirin use is indicated based on review of current medical condition/s. Pros and cons of this therapy have been discussed today. Benefits of this medication outweigh potential harm.  Patient has been encouraged to continue taking this medication.  .      Patient Active Problem List   Diagnosis    Forgetfulness    Anxiety    Depression    Esophageal reflux    Hallucinations    Head injury    Hypertension    Other acute sinusitis    PTSD (post-traumatic stress disorder)    Mixed hyperlipidemia    Acquired hypothyroidism    Vitamin D deficiency    Neuropathic pain     Advance Care Planning   Advance Care Planning     Advance Directive is not on file.  ACP discussion was held with the patient during this visit. Patient does not have an advance directive, declines further assistance.     Objective    Vitals:    09/28/23 1033   BP: 128/78   BP Location: Left arm   Patient Position: Sitting   Cuff Size: Adult   Pulse: 86   Resp: 18   Temp: 97.6 °F (36.4 °C)   SpO2: 96%   Weight: 78.2 kg (172 lb 6.4 oz)   Height: 160 cm (63\")     Estimated body mass index is 30.54 kg/m² as calculated from the following:    Height as of this encounter: 160 cm (63\").    Weight as of this encounter: 78.2 kg (172 lb 6.4 oz).    BMI is >= 30 and <35. (Class 1 Obesity). The following options were offered after discussion;: exercise counseling/recommendations and nutrition " counseling/recommendations      Does the patient have evidence of cognitive impairment? Yes          HEALTH RISK ASSESSMENT    Smoking Status:  Social History     Tobacco Use   Smoking Status Never   Smokeless Tobacco Never     Alcohol Consumption:  Social History     Substance and Sexual Activity   Alcohol Use Never     Fall Risk Screen:    INDIRA Fall Risk Assessment was completed, and patient is at MODERATE risk for falls. Assessment completed on:2023    Depression Screenin/28/2023    10:44 AM   PHQ-2/PHQ-9 Depression Screening   Little Interest or Pleasure in Doing Things 0-->not at all   Feeling Down, Depressed or Hopeless 1-->several days   PHQ-9: Brief Depression Severity Measure Score 1       Health Habits and Functional and Cognitive Screenin/28/2023    10:45 AM   Functional & Cognitive Status   Do you have difficulty preparing food and eating? Yes   Do you have difficulty bathing yourself, getting dressed or grooming yourself? No   Do you have difficulty using the toilet? No   Do you have difficulty moving around from place to place? No   Do you have trouble with steps or getting out of a bed or a chair? Yes   Current Diet Well Balanced Diet   Dental Exam Not up to date   Eye Exam Not up to date   Exercise (times per week) 2 times per week   Current Exercises Include Walking   Do you need help using the phone?  No   Are you deaf or do you have serious difficulty hearing?  No   Do you need help to go to places out of walking distance? No   Do you need help shopping? Yes   Do you need help preparing meals?  Yes   Do you need help with housework?  Yes   Do you need help with laundry? No   Do you need help taking your medications? No   Do you need help managing money? Yes   Do you ever drive or ride in a car without wearing a seat belt? No   Do you have difficulty concentrating, remembering or making decisions? Yes       Age-appropriate Screening Schedule:  Refer to the list below for  future screening recommendations based on patient's age, sex and/or medical conditions. Orders for these recommended tests are listed in the plan section. The patient has been provided with a written plan.    Health Maintenance   Topic Date Due    MAMMOGRAM  Never done    DXA SCAN  Never done    BMI FOLLOWUP  Never done    COLORECTAL CANCER SCREENING  Never done    ZOSTER VACCINE (1 of 2) Never done    HEPATITIS C SCREENING  Never done    Pneumococcal Vaccine 65+ (2 - PPSV23 or PCV20) 10/08/2022    LIPID PANEL  03/14/2023    COVID-19 Vaccine (1) 09/30/2023 (Originally 1950)    TDAP/TD VACCINES (1 - Tdap) 09/28/2024 (Originally 3/13/1969)    INFLUENZA VACCINE  10/01/2023    ANNUAL WELLNESS VISIT  09/28/2024                  CMS Preventative Services Quick Reference  Risk Factors Identified During Encounter  Depression/Dysphoria:  managed by psych  Fall Risk-High or Moderate: Discussed Fall Prevention in the home  Immunizations Discussed/Encouraged: Prevnar 20 (Pneumococcal 20-valent conjugate) and Shingrix  Inactivity/Sedentary:  increase activity as tolerated  Polypharmacy: Medication List reviewed  Dental Screening Recommended  Vision Screening Recommended  The above risks/problems have been discussed with the patient.  Pertinent information has been shared with the patient in the After Visit Summary.  An After Visit Summary and PPPS were made available to the patient.    Follow Up:   Next Medicare Wellness visit to be scheduled in 1 year.       Additional E&M Note during same encounter follows:  Patient has multiple medical problems which are significant and separately identifiable that require additional work above and beyond the Medicare Wellness Visit.      Chief Complaint  Medicare Wellness-subsequent    Subjective        HPI  Payal Singer is also being seen today for   HTN-well controlled  Denies chest pain, headache, dizziness  HLD-leg cramps  Hypothyroid-due for labs    She reports forgetting things  "more often.       Objective   Vital Signs:  /78 (BP Location: Left arm, Patient Position: Sitting, Cuff Size: Adult)   Pulse 86   Temp 97.6 °F (36.4 °C)   Resp 18   Ht 160 cm (63\")   Wt 78.2 kg (172 lb 6.4 oz)   SpO2 96%   BMI 30.54 kg/m²     Physical Exam  Vitals and nursing note reviewed.   Constitutional:       General: She is not in acute distress.     Appearance: Normal appearance.   HENT:      Head: Normocephalic and atraumatic.      Right Ear: External ear normal.      Left Ear: External ear normal.      Nose: Nose normal.      Mouth/Throat:      Mouth: Mucous membranes are moist.   Eyes:      Conjunctiva/sclera: Conjunctivae normal.   Cardiovascular:      Rate and Rhythm: Normal rate and regular rhythm.      Pulses: Normal pulses.      Heart sounds: Normal heart sounds. No murmur heard.    No friction rub. No gallop.   Pulmonary:      Effort: Pulmonary effort is normal. No respiratory distress.      Breath sounds: No wheezing, rhonchi or rales.   Musculoskeletal:      Cervical back: Neck supple.      Right lower leg: No edema.      Left lower leg: No edema.   Skin:     General: Skin is warm and dry.   Neurological:      General: No focal deficit present.      Mental Status: She is alert and oriented to person, place, and time.   Psychiatric:         Mood and Affect: Mood normal.         Behavior: Behavior normal.                       Assessment and Plan   Diagnoses and all orders for this visit:    1. Encounter for annual wellness exam in Medicare patient (Primary)  -     Comprehensive Metabolic Panel  -     CBC & Differential  -     TSH  -     Lipid Panel  -     T4, Free  -     Zoster Vac Recomb Adjuvanted 50 MCG/0.5ML reconstituted suspension; Inject 0.5 mL into the appropriate muscle as directed by prescriber 1 (One) Time for 1 dose. Repeat vaccine in 2-6 mths  Dispense: 0.5 mL; Refill: 1  -     Pneumococcal Conjugate Vaccine 20-Valent (PCV20)    2. Annual physical exam  -     Comprehensive " Metabolic Panel  -     CBC & Differential  -     TSH  -     Lipid Panel  -     T4, Free    3. Vitamin D deficiency  -     Vitamin D,25-Hydroxy    4. Acquired hypothyroidism  Comments:  labs today    5. Primary hypertension  Comments:  labs today. well controlled    6. Mixed hyperlipidemia  Comments:  labs    7. Forgetfulness  Comments:  checking b12, thyroid and rpr. she reports this is not bothersome to her. reports gradual change over years. in safe environment, re-eval in 6 mths. MOCA@f/u  Orders:  -     Vitamin B12  -     RPR    8. Need for pneumococcal vaccination  -     Pneumococcal Conjugate Vaccine 20-Valent (PCV20)    9. Screening mammogram for breast cancer  -     Mammo Screening Digital Tomosynthesis Bilateral With CAD; Future    10. Post-menopausal  -     DEXA Bone Density Axial; Future    11. Colon cancer screening  -     Cologuard - Stool, Per Rectum; Future    12. Need for hepatitis C screening test  -     Hepatitis C Antibody             Follow Up   Return in about 6 months (around 3/28/2024).  Patient was given instructions and counseling regarding her condition or for health maintenance advice. Please see specific information pulled into the AVS if appropriate.

## 2023-09-29 LAB — RPR SER QL: NORMAL

## 2023-10-26 ENCOUNTER — TELEMEDICINE (OUTPATIENT)
Dept: PSYCHIATRY | Facility: CLINIC | Age: 73
End: 2023-10-26
Payer: MEDICARE

## 2023-10-26 DIAGNOSIS — R68.89 FORGETFULNESS: ICD-10-CM

## 2023-10-26 DIAGNOSIS — R44.3 HALLUCINATIONS: ICD-10-CM

## 2023-10-26 DIAGNOSIS — F33.0 MILD RECURRENT MAJOR DEPRESSION: ICD-10-CM

## 2023-10-26 DIAGNOSIS — M79.2 NEUROPATHIC PAIN: ICD-10-CM

## 2023-10-26 DIAGNOSIS — F41.1 GENERALIZED ANXIETY DISORDER: Primary | ICD-10-CM

## 2023-10-26 NOTE — PROGRESS NOTES
"Subjective   Payal Singer is a 73 y.o. female who presents today for follow up    Chief Complaint:  mdd meagan    History of Present Illness:     Chart review 4/15: Seen 3/30 to establish care. Hx of anx/dep avh in [place]. on both sertraline and venlafaxine, was on Seroquel. Hx of HTN, GERD. On gabapentin 600 tid, sertraline 200 d, venla 150 d, quetiap 25 qhs. 2021 labs: cbc shows low mch, high rdw; Cr 1.15 high, abnormal lipids, TSH and fT4 wnl. No OP head imaging, ekg.     \"Payal\" and \"Meagan\"      10/26: In person interview:  Chart review:   Int med, reassuring rpr/b12/vit D/hcv/hyroid studies/cbc. Cmp: elev gluc 103, cr 1.04, ap 137.  No visits.  no new  Plannin/27: Trouble snoring, maintenance insomnia. Sleep study. Start melatonin. Fear of spiders getting on the bed, but no AVH (but history of them).  : Behavioral strategies have helped patient. Patient still not getting out of the house, agrees to try at least 1x/wk. Stable, nearly at goal.   Stable, well. Discussed behavioral strategies to manage 's behavior.  Calming:  Children or friends calling on the phone  Meals available (food)  snacks  \"I'm ok.\"  :   Behavior is related to compliance. We discussed strategies for compliance, such as helping give him his am meds.  Snacks, meals, calls from children and friends helping keep him calm  I'm doing pretty good. My mood depends on his mood.  Mood/Depression: good mood  Anxiety: stable worrying  Panic attacks: n  Energy: baseline low  Concentration: baseline low  Sleeping: sleeping pretty good.  Eating: stable  Refills: y  Substances: def  Therapy: n  Medication compliant: y  SE: n  No SI HI AVH.      : In person interview:  Chart review:   No visits.  no new  Plannin/25: Behavioral strategies have helped patient. Patient still not getting out of the house, agrees to try at least 1x/wk. Stable, nearly at goal.   Stable, well. Discussed behavioral strategies to manage 's " "behavior.  Calming:  Children or friends calling on the phone  Meals available (food)  snacks  \"I'm good.\"  : \"he's fair. He can't do anything anymore.\" Wheelchair bound for the last 2-3 months. Due to falls.  Meagan: \"they helped a little.\" The food helped. The meds help.  Mood/Depression: good mood  Anxiety: stable worrying  Panic attacks: n  Energy: baseline low  Concentration: baseline low  Sleeping: maintenance insomnia x1 year (told me today)  Eating: stable  Refills: y  Substances: def  Therapy: n  Medication compliant: y  SE: n  No SI HI AVH.      7/25: In person interview:  Chart review: no new  Planning: Stable, well. Discussed behavioral strategies to manage 's behavior.  Calming:  Children or friends calling on the phone  Meals available (food)  snacks  \"We did a different menu.\"  \"He just seems like he's feeling better.\"  Meagan: \"they helped a little.\" The food helped. The meds help.  Mood/Depression: good mood  Anxiety: less anxious, on ege  Panic attacks: n  Energy: baseline low  Concentration: baseline low  Sleeping: well  Eating: stable  Refills: y  Substances: def  Therapy: n  Medication compliant: y  SE: n  No SI HI AVH.        6/20: In person.  Interview:  Chart review: no new.  Planning: Stable. Couldn't figure out Twilio. Short visit. See back in person in 6 wks to discuss strategies for mx behavioral concerns.   \"Yeah, it's my .\"  He yells at me.  Triggers:   Moving helped. More space.   may have PTSD.  He may be medication noncompliant.  Calming:  Children or friends calling on the phone  Meals available (food)  snacks  Mood/Depression: I'm doing ok  Anxiety: good, stable  Panic attacks: n  Energy: baseline down  Concentration: baseline down  Sleeping: fairly well  Eating: stable  Refills: y  Substances: n  Therapy: n  Medication compliant: y  SE: n  No SI HI AVH.        5/1: Virtual visit via Zoom audio and video due to the COVID-19 pandemic.  Patient is " "accepting of and agreeable to visit.  The visit consisted of the patient and I. The patient is at home, and I am at the office.  Interview:  Chart review: No new.  Planning: Seroquel dose in the afternoon helps, but the real problem is a situational stressor that cannot be solved with the medication.  We brainstormed possible ideas regarding how to manage patient's 's behavior.  We discussed having him accompany patient to her appointments to develop familiarity with me in order for me to potentially take over his medications.  They do not think that will help.   \"I've been pretty good.\"  We moved to another house this Saturday.  More room, but 's behavior hasn't changed. It has a basement.  Meagan: we should set up an appnt in person.  They are both in the walkout basement. They can't take stairs. They are around each constantly.  In person: would be more open.  Mood/Depression: stable  Anxiety: stable  Panic attacks: n  Energy: stable   Concentration: stable  Sleeping: well  Eating: stable weight  Refills: n  Substances: n  Therapy: n  Medication compliant: y  SE:   No SI HI AVH.        ...      Previous:  Still having panic attacks once a week. Sx: soa, sweating, palpitations, tachycardia, fear, no derealization/depersonalization, last 30 min, leaves room where she was at. No triggers. Fear of another panic attack happening.     Anxiety due to 's health; admitted to hospital recently for dehydration. Notes uncontrolled worrying, muscle tension, irritability, restlessness, trouble sleeping - will wake at 3 am and can't go back to sleep. Duration is at least 6 months, since moved here.    Still having hallucinations every night before she goes to bed. Sees shadows walk past bed. No AH. Sometimes sees spiders during the day on the floor; happens once or twice a week.     Seroquel helped to some extent.     Again, patient is somewhat of a poor historian. Often times she will answer a question " "with \"no,\" and then contradict her self moments later.    Collateral from Meagan: patient did have a UTI (UA confirmed); was put on a course of abx. Feels her mom's confusion has improved since then.      Past Surgical History:  Past Surgical History:   Procedure Laterality Date    CATARACT EXTRACTION  2002    CHOLECYSTECTOMY OPEN  1971       Problem List:  Patient Active Problem List   Diagnosis    Forgetfulness    Anxiety    Depression    Esophageal reflux    Hallucinations    Head injury    Hypertension    Other acute sinusitis    PTSD (post-traumatic stress disorder)    Mixed hyperlipidemia    Acquired hypothyroidism    Vitamin D deficiency    Neuropathic pain       Allergy:   Allergies   Allergen Reactions    Floxin Otic [Ofloxacin] Rash        Discontinued Medications:  There are no discontinued medications.    Current Medications:   Current Outpatient Medications   Medication Sig Dispense Refill    acetaminophen (TYLENOL) 500 MG tablet Take 1 tablet by mouth Every 6 (Six) Hours As Needed for Mild Pain.      Ascorbic Acid (Vitamin C) 250 MG chewable tablet Chew.      aspirin 81 MG EC tablet aspirin 81 mg oral tablet,delayed release (DR/EC) take 1 tablet (81 mg) by oral route once daily   Active      atorvastatin (LIPITOR) 40 MG tablet Take 1 tablet by mouth Daily. 90 tablet 1    gabapentin (NEURONTIN) 300 MG capsule TAKE 1 CAPSULE BY MOUTH TWICE A DAY 60 capsule 5    levothyroxine (SYNTHROID, LEVOTHROID) 75 MCG tablet TAKE 1 TABLET BY MOUTH EVERY DAY 90 tablet 1    lisinopril (PRINIVIL,ZESTRIL) 10 MG tablet Take 1 tablet by mouth Daily. 90 tablet 1    Melatonin 10 MG tablet Take 1 tablet by mouth Every Night.      metoprolol tartrate (LOPRESSOR) 25 MG tablet Take 2 tablets by mouth Daily for 90 days. TAKE 2 TABLETS BY MOUTH EVERY  tablet 0    omeprazole (priLOSEC) 40 MG capsule Take 1 capsule by mouth Daily. 90 capsule 1    QUEtiapine (SEROquel) 25 MG tablet 25 MG (1 TAB) AT 2PM, 50 MG (2 TAB) NIGHTLY " "180 tablet 3    venlafaxine XR (EFFEXOR-XR) 150 MG 24 hr capsule TAKE 1 CAPSULE BY MOUTH EVERY DAY 90 capsule 1    vitamin D (ERGOCALCIFEROL) 1.25 MG (75208 UT) capsule capsule TAKE 1 CAPSULE BY MOUTH 1 TIME PER WEEK. 13 capsule 1    Zinc Sulfate (ZINC 15 PO) Take  by mouth.       No current facility-administered medications for this visit.       Past Medical History:  Past Medical History:   Diagnosis Date    Acid reflux     Allergies     Anxiety     Depression     Forgetfulness     Hallucinations 04/15/2021    Head injury     HTN (hypertension)     PTSD (post-traumatic stress disorder)          Social History     Socioeconomic History    Marital status: Unknown   Tobacco Use    Smoking status: Never    Smokeless tobacco: Never   Vaping Use    Vaping Use: Never used   Substance and Sexual Activity    Alcohol use: Never    Drug use: Never    Sexual activity: Not Currently         Family History   Problem Relation Age of Onset    Stroke Mother     Heart disease Mother     Stroke Father     Heart disease Father     Diabetes Father     Stroke Sister     Diabetes Sister     Stroke Brother     Diabetes Brother     Diabetes Other         AUNT/UNCLE    Anxiety disorder Other        Mental Status Exam:   Hygiene:   good, at home  Cooperation:  Cooperative  Eye Contact:  Good  Psychomotor Behavior:  Appropriate  Affect:  Appropriate, mood congruent, good variability  Mood: \"I'm good, sleeping good\"  Speech:  Normal  Thought Process:  Goal directed  Thought Content:  Normal and Mood congruent  Suicidal:  None  Homicidal:  None  Hallucinations:  None  Delusion:  None  Memory:  Deficits  Orientation:  Grossly intact  Reliability:  fair  Insight:  Fair  Judgement:  Fair  Impulse Control:  Fair  Physical/Medical Issues:  No      Review of Systems:  Review of Systems   Constitutional:  Positive for fatigue. Negative for diaphoresis.   HENT:  Negative for drooling.    Eyes:  Negative for visual disturbance.   Respiratory:  Positive " for cough. Negative for shortness of breath.    Cardiovascular:  Negative for chest pain, palpitations and leg swelling.   Gastrointestinal:  Negative for diarrhea, nausea and vomiting.   Endocrine: Negative for cold intolerance and heat intolerance.   Genitourinary:  Negative for difficulty urinating.   Musculoskeletal:  Negative for joint swelling.   Allergic/Immunologic: Negative for immunocompromised state.   Neurological:  Positive for light-headedness. Negative for dizziness, seizures, syncope, speech difficulty, numbness and headaches.   Hematological:  Negative for adenopathy.   Psychiatric/Behavioral:  Positive for sleep disturbance.          Physical Exam:  Physical Exam    Vital Signs:   There were no vitals taken for this visit.     Lab Results:   Office Visit on 09/28/2023   Component Date Value Ref Range Status    Glucose 09/28/2023 103 (H)  65 - 99 mg/dL Final    BUN 09/28/2023 14  8 - 23 mg/dL Final    Creatinine 09/28/2023 1.04 (H)  0.57 - 1.00 mg/dL Final    Sodium 09/28/2023 140  136 - 145 mmol/L Final    Potassium 09/28/2023 4.0  3.5 - 5.2 mmol/L Final    Chloride 09/28/2023 103  98 - 107 mmol/L Final    CO2 09/28/2023 28.0  22.0 - 29.0 mmol/L Final    Calcium 09/28/2023 9.0  8.6 - 10.5 mg/dL Final    Total Protein 09/28/2023 7.0  6.0 - 8.5 g/dL Final    Albumin 09/28/2023 3.8  3.5 - 5.2 g/dL Final    ALT (SGPT) 09/28/2023 7  1 - 33 U/L Final    AST (SGOT) 09/28/2023 12  1 - 32 U/L Final    Alkaline Phosphatase 09/28/2023 137 (H)  39 - 117 U/L Final    Total Bilirubin 09/28/2023 0.4  0.0 - 1.2 mg/dL Final    Globulin 09/28/2023 3.2  gm/dL Final    A/G Ratio 09/28/2023 1.2  g/dL Final    BUN/Creatinine Ratio 09/28/2023 13.5  7.0 - 25.0 Final    Anion Gap 09/28/2023 9.0  5.0 - 15.0 mmol/L Final    eGFR 09/28/2023 56.5 (L)  >60.0 mL/min/1.73 Final    TSH 09/28/2023 0.763  0.270 - 4.200 uIU/mL Final    Total Cholesterol 09/28/2023 133  0 - 200 mg/dL Final    Triglycerides 09/28/2023 176 (H)  0 - 150  mg/dL Final    HDL Cholesterol 09/28/2023 46  40 - 60 mg/dL Final    LDL Cholesterol  09/28/2023 58  0 - 100 mg/dL Final    VLDL Cholesterol 09/28/2023 29  5 - 40 mg/dL Final    LDL/HDL Ratio 09/28/2023 1.13   Final    Free T4 09/28/2023 1.33  0.93 - 1.70 ng/dL Final    25 Hydroxy, Vitamin D 09/28/2023 60.1  30.0 - 100.0 ng/ml Final    Vitamin B-12 09/28/2023 368  211 - 946 pg/mL Final    RPR 09/28/2023 Non-Reactive  Non-Reactive Final    Hepatitis C Ab 09/28/2023 Non-Reactive  Non-Reactive Final    WBC 09/28/2023 7.16  3.40 - 10.80 10*3/mm3 Final    RBC 09/28/2023 4.42  3.77 - 5.28 10*6/mm3 Final    Hemoglobin 09/28/2023 12.2  12.0 - 15.9 g/dL Final    Hematocrit 09/28/2023 37.7  34.0 - 46.6 % Final    MCV 09/28/2023 85.3  79.0 - 97.0 fL Final    MCH 09/28/2023 27.6  26.6 - 33.0 pg Final    MCHC 09/28/2023 32.4  31.5 - 35.7 g/dL Final    RDW 09/28/2023 13.5  12.3 - 15.4 % Final    RDW-SD 09/28/2023 42.3  37.0 - 54.0 fl Final    MPV 09/28/2023 10.4  6.0 - 12.0 fL Final    Platelets 09/28/2023 292  140 - 450 10*3/mm3 Final    Neutrophil % 09/28/2023 81.2 (H)  42.7 - 76.0 % Final    Lymphocyte % 09/28/2023 10.3 (L)  19.6 - 45.3 % Final    Monocyte % 09/28/2023 6.4  5.0 - 12.0 % Final    Eosinophil % 09/28/2023 1.1  0.3 - 6.2 % Final    Basophil % 09/28/2023 0.4  0.0 - 1.5 % Final    Immature Grans % 09/28/2023 0.6 (H)  0.0 - 0.5 % Final    Neutrophils, Absolute 09/28/2023 5.81  1.70 - 7.00 10*3/mm3 Final    Lymphocytes, Absolute 09/28/2023 0.74  0.70 - 3.10 10*3/mm3 Final    Monocytes, Absolute 09/28/2023 0.46  0.10 - 0.90 10*3/mm3 Final    Eosinophils, Absolute 09/28/2023 0.08  0.00 - 0.40 10*3/mm3 Final    Basophils, Absolute 09/28/2023 0.03  0.00 - 0.20 10*3/mm3 Final    Immature Grans, Absolute 09/28/2023 0.04  0.00 - 0.05 10*3/mm3 Final    nRBC 09/28/2023 0.0  0.0 - 0.2 /100 WBC Final       EKG Results:  No orders to display       Imaging Results:  No Images in the past 120 days found..      Assessment & Plan    Diagnoses and all orders for this visit:    1. Generalized anxiety disorder (Primary)    2. Mild recurrent major depression    3. Forgetfulness    4. Neuropathic pain    5. Hallucinations      Presentation most consistent with developing dementia, likely Lewy body dementia.  Patient also reports worsening gait abnormalities; she fell in the summer of last year, injuring her head, and requiring stitches.  Patient also has a history of depression, possible PTSD by review of chart.  Patient did not mention PTSD during the interview, but she is not a very good historian.  Often times she will say no to a question, and then contradict herself only a few moments later.  She has some trouble with knowing her medications or her medical conditions.    10/26: Now sleeping. Discussed further strategies for , such as proactively getting him his meds every morning. Meagan is in agreement. Discuss sleep study next visit.    Allowed patient to freely discuss and process issues, such as:  Her history of AVH 2 years ago; none now.  Behavioral strategies to manage the household  Strategies for compliance with meds in the house (Meagan to try to manage the pill box)  ... using Tim (Carlos Verdugo) psychotherapeutic techniques including unconditional positive regard, reflective listening, and demonstrating clear empathy.     Time (minutes) spent providing supportive psychotherapy: 5  Functional status: mild impairment  Treatment plan: Medication management and supportive psychotherapy  Prognosis: good  Progress: improved, no insomnia  6w    9/27: Trouble snoring, maintenance insomnia. Sleep study. Start melatonin. Fear of spiders getting on the bed, but no AVH (but history of them).    7/25: Behavioral strategies have helped patient. Patient still not getting out of the house, agrees to try at least 1x/wk. Stable, nearly at goal.     6/20: Stable, well. Discussed behavioral strategies to manage 's behavior.     5/1:  Stable. Couldn't figure out Twilio. Short visit. See back in person in 6 wks to discuss strategies for mx behavioral concerns.     1/31: Seroquel dose in the afternoon helps, but the real problem is a situational stressor that cannot be solved with the medication.  We brainstormed possible ideas regarding how to manage patient's 's behavior.  We discussed having him accompany patient to her appointments to develop familiarity with me in order for me to potentially take over her medications.  They do not think that will help.      11/30: Buspar not helpful. Stop. Still anxious and depressed at 3 - 7 pm, strangely. Target with a dose of seroquel.  Can also try increasing frequency of gabapentin, low-dose trazodone.      8/31: Pt's anxiety comes from taking care of her father, per Meagan. He's dealing with depression. Meagan is stressed. Discussed getting more help to take care of pt and her father. Pt is better. No changes to meds.     7/19: Increase effexor to target residual anx and dep.  Consider increasing BuSpar soon at next appointment.      4/19: Patient is well and right where she should be.     3/8: Target the feeling of anxiety at 4 PM by starting BuSpar at 3 PM.  Consider switching to gabapentin if BuSpar does not help.  Would start at a low dose of 100 mg.      11/8: Depressed in the mornings. Light box, increase venlafaxine.     9/7: Doing even better.  8 weeks.  No changes.    7/27: Markedly improved, and far more linear.  Still has residual anxiety and depression.  Hallucinations are still present but they do not cause distress at all to the patient.  Continue Seroquel at the present dose.  Double the dose of venlafaxine. See back in 6 weeks.  This was also discussed with Meagan, patient's daughter.    Previous:  Caution again advised in terms of increasing the dose given the likely sensitivity she has to antipsychotics.  Psychotherapy is deferred at this time.  S/p referral to neurologist is  appropriate for further management.     Visit Diagnoses:    ICD-10-CM ICD-9-CM   1. Generalized anxiety disorder  F41.1 300.02   2. Mild recurrent major depression  F33.0 296.31   3. Forgetfulness  R68.89 780.99   4. Neuropathic pain  M79.2 729.2   5. Hallucinations  R44.3 780.1       PLAN:  Risk Assessment: Risk of self-harm acutely is low. Risk factors include anxiety disorder, depressive disorder, access to weapons, recent psychosocial stressors (pandemic). Protective factors include no family history, no present SI, no history of suicide attempts or self-harm in the past, no access to weapons, no AODA, healthcare seeking, future orientation, willingness to engage in care. Risk of self-harm chronically is also low, but could be further elevated in the event of treatment noncompliance and/or AODA.  Safety: No acute safety concerns.  Medications:   CONTINUE melatonin 10 mg qhs. Risks, benefits, side effects discussed with patient including sedation, dizziness/falls risk, GI upset.  Do not use before operating vehicle, vessel, or machine. After discussion of these risks and benefits, the patient voiced understanding and agreed to proceed.   CONTINUE quetiapine 50 mg nightly, 25 mg q2pm. Risks, benefits, alternatives discussed with patient including nausea and vomiting, GI upset, sedation, akathisia, hypotension, increased appetite, lowering of seizure threshold, theoretical risk of tardive dyskinesia, extrapyramidal symptoms, restless legs syndrome. After discussion of these risks and benefits, the patient voiced understanding and agreed to proceed.  CONTINUE venlafaxine 150 mg PO QDAY. Risks, benefits, alternatives discussed with patient including GI upset, nausea vomiting diarrhea, theoretical decrease of seizure threshold predisposing the patient to a slightly higher seizure risk, headaches, sexual dysfunction, serotonin syndrome, bleeding risk, increased suicidality in patients 24 years and younger.  After  discussion of these risks and benefits, the patient voiced understanding and agreed to proceed.  CONTINUE with gabapentin 300 twice daily. Risks, benefits, alternatives discussed with patient including sedation, dizziness/falls risk, GI upset, weight gain.  After discussion of these risks and benefits, the patient voiced understanding and agreed to proceed. UDS ordered, Ernestina reviewed.  S/P:  BuSpar 10 mg at 3 PM daily. Ineffective.  sertraline 100 mg daily.  Not effective.  Therapy: Deferred.  Other: s/p referral to neurology for workup of likely dementia.      TREATMENT PLAN/GOALS: Continue supportive psychotherapy efforts and medications as indicated. Treatment and medication options discussed during today's visit. Patient ackowledged and verbally consented to continue with current treatment plan and was educated on the importance of compliance with treatment and follow-up appointments.    MEDICATION ISSUES:  ERNESTINA reviewed as expected.  Discussed medication options and treatment plan of prescribed medication as well as the risks, benefits, and side effects including potential falls, possible impaired driving and metabolic adversities among others. Patient is agreeable to call the office with any worsening of symptoms or onset of side effects. Patient is agreeable to call 911 or go to the nearest ER should he/she begin having SI/HI. No medication side effects or related complaints today.     MEDS ORDERED DURING VISIT:  No orders of the defined types were placed in this encounter.      Return in about 6 weeks (around 12/7/2023).         This document has been electronically signed by Riki Peterson MD  October 26, 2023 16:41 EDT      Part of this note may be an electronic transcription/translation of spoken language to printed text using the Dragon Dictation System.

## 2023-10-27 ENCOUNTER — TELEPHONE (OUTPATIENT)
Dept: PSYCHIATRY | Facility: CLINIC | Age: 73
End: 2023-10-27
Payer: MEDICARE

## 2023-11-08 ENCOUNTER — TELEPHONE (OUTPATIENT)
Dept: ORTHOPEDIC SURGERY | Facility: CLINIC | Age: 73
End: 2023-11-08
Payer: MEDICARE

## 2023-11-10 ENCOUNTER — OFFICE VISIT (OUTPATIENT)
Dept: ORTHOPEDIC SURGERY | Facility: CLINIC | Age: 73
End: 2023-11-10
Payer: MEDICARE

## 2023-11-10 VITALS
DIASTOLIC BLOOD PRESSURE: 106 MMHG | HEART RATE: 81 BPM | HEIGHT: 63 IN | WEIGHT: 170 LBS | BODY MASS INDEX: 30.12 KG/M2 | SYSTOLIC BLOOD PRESSURE: 207 MMHG | OXYGEN SATURATION: 95 %

## 2023-11-10 DIAGNOSIS — S52.502A CLOSED FRACTURE OF DISTAL ENDS OF LEFT RADIUS AND ULNA, INITIAL ENCOUNTER: Primary | ICD-10-CM

## 2023-11-10 DIAGNOSIS — S52.602A CLOSED FRACTURE OF DISTAL ENDS OF LEFT RADIUS AND ULNA, INITIAL ENCOUNTER: Primary | ICD-10-CM

## 2023-11-10 RX ORDER — HYDROCODONE BITARTRATE AND ACETAMINOPHEN 7.5; 325 MG/1; MG/1
1 TABLET ORAL EVERY 6 HOURS PRN
Qty: 18 TABLET | Refills: 0 | Status: SHIPPED | OUTPATIENT
Start: 2023-11-10

## 2023-11-10 NOTE — PROGRESS NOTES
"Chief Complaint  Initial Evaluation of the Left Wrist     Subjective      Payal Singer presents to [facility] for evaluation of the left wrist. She was taking out her garbage, fell and injured her left wrist on 11/7/23. She was seen and evaluated with x-rays and was placed into a brace. She locates pain to the ulnar wrist. She has been taking motrin and tylenol for the pain.       Allergies   Allergen Reactions    Floxin Otic [Ofloxacin] Rash        Social History     Socioeconomic History    Marital status: Unknown   Tobacco Use    Smoking status: Never    Smokeless tobacco: Never   Vaping Use    Vaping Use: Never used   Substance and Sexual Activity    Alcohol use: Never    Drug use: Never    Sexual activity: Not Currently        I reviewed the patient's chief complaint, history of present illness, review of systems, past medical history, surgical history, family history, social history, medications, and allergy list.     Review of Systems     Constitutional: Denies fevers, chills, weight loss  Cardiovascular: Denies chest pain, shortness of breath  Skin: Denies rashes, acute skin changes  Neurologic: Denies headache, loss of consciousness  MSK: Left wrist pain      Vital Signs:   BP (!) 207/106 Comment: pt aware of high BP advised to contact PCP  Pulse 81   Ht 160 cm (63\")   Wt 77.1 kg (170 lb)   SpO2 95%   BMI 30.11 kg/m²          Physical Exam  General: Alert. No acute distress    Ortho Exam      Left wrist- Sensation to light touch median, radial, ulnar nerve. Positive AIN, PIN, ulnar nerve motor function. Positive pulses. Forward elevation 100, Abduction 100, weakness with rotator cuff testing. ecchymosis to the hand with mild tenderness and swelling to the distal radius and distal ulna. Neurovascularly intact.     Orthopedic Injury Treatment    Date/Time: 11/10/2023 8:44 AM    Performed by: Rom Holguin MD  Authorized by: Rom Holguin MD  Injury location: " wrist  Location details: left wrist  Pre-procedure neurovascular assessment: neurovascularly intact    Anesthesia:  Local anesthesia used: no    Sedation:  Patient sedated: no    Immobilization: cast (short arm)  Supplies used: cotton padding (Fiberglass)  Post-procedure neurovascular assessment: post-procedure neurovascularly intact  Patient tolerance: patient tolerated the procedure well with no immediate complications  Comments: Closed treatment was obtained and fiberglass cast was applied.  The patient tolerated the procedure without any complications.  Applied by Vivienne Lamar and Arianne Ortiz           Imaging Results (Most Recent)       None             Result Review :       XR Shoulder 2+ View Left    Result Date: 11/7/2023  Narrative: PROCEDURE: XR SHOULDER 2+ VW LEFT, 11/07/2023, 18:07 XR HAND 3+ VW LEFT, 11/07/2023, 18:07 XR WRIST 3+ VW LEFT, 11/07/2023, 18:07  COMPARISON: None  INDICATIONS: left shoulder pain; fell today onto concrete  FINDINGS:  Left shoulder:  There is no fracture or dislocation.  There are age related changes involving the AC joint.  There is calcification within the left axillary soft tissues.   Left hand:  There is a nondisplaced fracture of the ulnar styloid and probable nondisplaced fracture of the distal radius.  There are degenerative changes involving the 1st carpal/metacarpal joint space.  There are some osteoarthritic changes involving the interphalangeal joints.  There is osseous demineralization.  There is some soft tissue swelling along the dorsum of the hand.  Wrist:  There is a nondisplaced fracture of the ulnar styloid.  Better defined on lateral view of the hand it looks like there probably is a nondisplaced fracture of the distal radius.  There is osseous demineralization.  There are degenerative changes involving the 1st carpal/metacarpal joint space.       Impression:   1. No acute osseous abnormality of the left shoulder. 2. Nondisplaced fractures of the  distal radius and ulna are suggested. 3. Degenerative change 1st carpal/metacarpal joint space. 4. Osseous demineralization.      NEDA HARDING MD       Electronically Signed and Approved By: NEDA HARDING MD on 11/07/2023 at 18:43             XR Wrist 3+ View Left    Result Date: 11/7/2023  Narrative: PROCEDURE: XR SHOULDER 2+ VW LEFT, 11/07/2023, 18:07 XR HAND 3+ VW LEFT, 11/07/2023, 18:07 XR WRIST 3+ VW LEFT, 11/07/2023, 18:07  COMPARISON: None  INDICATIONS: left shoulder pain; fell today onto concrete  FINDINGS:  Left shoulder:  There is no fracture or dislocation.  There are age related changes involving the AC joint.  There is calcification within the left axillary soft tissues.   Left hand:  There is a nondisplaced fracture of the ulnar styloid and probable nondisplaced fracture of the distal radius.  There are degenerative changes involving the 1st carpal/metacarpal joint space.  There are some osteoarthritic changes involving the interphalangeal joints.  There is osseous demineralization.  There is some soft tissue swelling along the dorsum of the hand.  Wrist:  There is a nondisplaced fracture of the ulnar styloid.  Better defined on lateral view of the hand it looks like there probably is a nondisplaced fracture of the distal radius.  There is osseous demineralization.  There are degenerative changes involving the 1st carpal/metacarpal joint space.       Impression:   1. No acute osseous abnormality of the left shoulder. 2. Nondisplaced fractures of the distal radius and ulna are suggested. 3. Degenerative change 1st carpal/metacarpal joint space. 4. Osseous demineralization.      NEDA HARDING MD       Electronically Signed and Approved By: NEDA HARDING MD on 11/07/2023 at 18:43             XR Hand 3+ View Left    Result Date: 11/7/2023  Narrative: PROCEDURE: XR SHOULDER 2+ VW LEFT, 11/07/2023, 18:07 XR HAND 3+ VW LEFT, 11/07/2023, 18:07 XR WRIST 3+ VW LEFT, 11/07/2023, 18:07  COMPARISON: None   INDICATIONS: left shoulder pain; fell today onto concrete  FINDINGS:  Left shoulder:  There is no fracture or dislocation.  There are age related changes involving the AC joint.  There is calcification within the left axillary soft tissues.   Left hand:  There is a nondisplaced fracture of the ulnar styloid and probable nondisplaced fracture of the distal radius.  There are degenerative changes involving the 1st carpal/metacarpal joint space.  There are some osteoarthritic changes involving the interphalangeal joints.  There is osseous demineralization.  There is some soft tissue swelling along the dorsum of the hand.  Wrist:  There is a nondisplaced fracture of the ulnar styloid.  Better defined on lateral view of the hand it looks like there probably is a nondisplaced fracture of the distal radius.  There is osseous demineralization.  There are degenerative changes involving the 1st carpal/metacarpal joint space.       Impression:   1. No acute osseous abnormality of the left shoulder. 2. Nondisplaced fractures of the distal radius and ulna are suggested. 3. Degenerative change 1st carpal/metacarpal joint space. 4. Osseous demineralization.      NEDA HARDING MD       Electronically Signed and Approved By: NEDA HARDING MD on 11/07/2023 at 18:43                     Assessment and Plan     Diagnoses and all orders for this visit:    1. Closed fracture of distal ends of left radius and ulna, initial encounter (Primary)        Discussed the treatment plan with the patient.  I reviewed the previous x-rays with the patient. Plan for conservative treatment at this time. The patient was placed into a short arm cast today. Cast care reviewed.     Call or return if worsening symptoms.    Follow Up     4 weeks with cast removal and repeat x-rays.       Patient was given instructions and counseling regarding her condition or for health maintenance advice. Please see specific information pulled into the AVS if appropriate.      Scribed for Rom Holguin MD by Marybeth Lisa.  11/10/23   07:56 EST    I have personally performed the services described in this document as scribed by the above individual and it is both accurate and complete. Rom Holguin MD 11/12/23

## 2023-11-13 RX ORDER — LEVOTHYROXINE SODIUM 0.07 MG/1
TABLET ORAL
Qty: 90 TABLET | Refills: 1 | Status: SHIPPED | OUTPATIENT
Start: 2023-11-13

## 2023-12-14 ENCOUNTER — OFFICE VISIT (OUTPATIENT)
Dept: ORTHOPEDIC SURGERY | Facility: CLINIC | Age: 73
End: 2023-12-14
Payer: MEDICARE

## 2023-12-14 VITALS
DIASTOLIC BLOOD PRESSURE: 77 MMHG | WEIGHT: 169.97 LBS | BODY MASS INDEX: 30.12 KG/M2 | SYSTOLIC BLOOD PRESSURE: 188 MMHG | HEART RATE: 73 BPM | HEIGHT: 63 IN | OXYGEN SATURATION: 90 %

## 2023-12-14 DIAGNOSIS — M25.532 LEFT WRIST PAIN: ICD-10-CM

## 2023-12-14 DIAGNOSIS — S52.602D CLOSED FRACTURE OF DISTAL ENDS OF LEFT RADIUS AND ULNA WITH ROUTINE HEALING, SUBSEQUENT ENCOUNTER: Primary | ICD-10-CM

## 2023-12-14 DIAGNOSIS — S52.502D CLOSED FRACTURE OF DISTAL ENDS OF LEFT RADIUS AND ULNA WITH ROUTINE HEALING, SUBSEQUENT ENCOUNTER: Primary | ICD-10-CM

## 2023-12-14 PROBLEM — S52.602A CLOSED FRACTURE OF LEFT DISTAL RADIUS AND ULNA: Status: ACTIVE | Noted: 2023-12-14

## 2023-12-14 PROBLEM — S52.502A CLOSED FRACTURE OF LEFT DISTAL RADIUS AND ULNA: Status: ACTIVE | Noted: 2023-12-14

## 2023-12-14 NOTE — PROGRESS NOTES
"Chief Complaint  Follow-up and Pain of the Left Wrist and Cast Removal    Subjective          History of Present Illness      Payal Singer is a 73 y.o. female  presents to [facility] for     Patient presents for follow-up evaluation of left wrist distal radius fracture.  She is here with her daughter and her granddaughter.  Patient had cast removed today.  She states in the cast she had no pain but states now that she is out of the cast she has had worse pain.  She states pain is in the dorsum of her wrist and the ulnar side of her wrist.  She denies need for pain medication but has been taking Tylenol and ibuprofen as needed.  She states she has some finger stiffness and pain with finger movement.  She denies numbness and tingling      Allergies   Allergen Reactions    Floxin Otic [Ofloxacin] Rash        Social History     Socioeconomic History    Marital status: Unknown   Tobacco Use    Smoking status: Never    Smokeless tobacco: Never   Vaping Use    Vaping Use: Never used   Substance and Sexual Activity    Alcohol use: Never    Drug use: Never    Sexual activity: Not Currently        REVIEW OF SYSTEMS    Constitutional: Awake alert and oriented x3, no acute distress, denies fevers, chills, weight loss  Respiratory: No respiratory distress  Vascular: Brisk cap refill, Intact distal pulses, No cyanosis, compartments soft with no signs or symptoms of compartment syndrome or DVT.   Cardiovascular: Denies chest pain, shortness of breath  Skin: Denies rashes, acute skin changes  Neurologic: Denies headache, loss of consciousness  MSK: Left wrist pain      Objective   Vital Signs:   BP (!) 188/77   Pulse 73   Ht 160 cm (63\")   Wt 77.1 kg (169 lb 15.6 oz)   SpO2 90%   BMI 30.11 kg/m²     Body mass index is 30.11 kg/m².    Physical Exam       Left wrist: Mild skin dryness to the dorsum of her hand, otherwise skin is intact, no erythema, no full-thickness skin loss or signs of " infection,.  Nontender to palpation at radial fracture site, mild tenderness to palpation of ulnar side of her wrist at ulnar styloid fracture.  Range of motion of wrist limited secondary to stiffness and pain, patient able to wiggle fingers and thumb, sensation intact to light touch, patient able to make a full fist.      Procedures    Imaging Results (Most Recent)       Procedure Component Value Units Date/Time    XR Wrist 2 View Left [[id number]] Resulted: 12/14/23 1349     Updated: 12/14/23 1349    Narrative:      View:AP/Lateral view(s)  Site: Left wrist  Indication: Left wrist pain  Study: X-rays ordered, taken in the office, and reviewed today  X-ray: Good healing of distal radius fracture fracture line remains stable   compared to previous studies, healing ulnar styloid fracture stable  Comparative data: Previous studies             Result Review :   The following data was reviewed by: RICK Arenas on 12/14/2023:  Data reviewed : Radiologic studies reviewed by me with the patient              Assessment and Plan    Diagnoses and all orders for this visit:    1. Closed fracture of distal ends of left radius and ulna with routine healing, subsequent encounter (Primary)    2. Left wrist pain  -     XR Wrist 2 View Left        Reviewed x-rays with the patient and her family discussed diagnosis and treatment options.  Patient was advised she can remain out of the cast she was placed into a wrist brace.  She was given home exercises and advised to work on gentle range of motion, avoid heavy lift push pull activities, follow-up in 4 weeks with x-rays, may consider formal therapy if needed.    Call or return if worsening symptoms.    Follow Up   Return in about 4 weeks (around 1/11/2024) for Recheck.  Patient was given instructions and counseling regarding her condition or for health maintenance advice. Please see specific information pulled into the AVS if appropriate.       EMR Dragon/Transcription  disclaimer:  Much of this encounter note is an electronic transcription/translation of spoken language to printed text, aka voice recognition.  The electronic translation of spoken language may permit erroneous or at times nonsensical words or phrases to be inadvertently transcribed; although I have reviewed the note for such errors, some may still exist so please interpret based on surrounding text content.

## 2023-12-21 ENCOUNTER — TELEPHONE (OUTPATIENT)
Dept: PSYCHIATRY | Facility: CLINIC | Age: 73
End: 2023-12-21
Payer: MEDICARE

## 2023-12-21 NOTE — TELEPHONE ENCOUNTER
Patient was referred out to the sleep center, referral was sent back stating unable to contact patient to schedule, closing out referral.

## 2024-01-11 ENCOUNTER — TELEPHONE (OUTPATIENT)
Dept: ORTHOPEDIC SURGERY | Facility: CLINIC | Age: 74
End: 2024-01-11
Payer: MEDICARE

## 2024-01-11 NOTE — TELEPHONE ENCOUNTER
LVM FOR PATIENT THAT WE NEED TO RE-ALICIA HER APT ON 01-15-24 WITH JAMIL COURTNEY. OK FOR HUB TO RELAY AND RE-ALICIA PATIENT AT [facility] OR THE [facility]

## 2024-02-05 ENCOUNTER — OFFICE VISIT (OUTPATIENT)
Dept: ORTHOPEDIC SURGERY | Facility: CLINIC | Age: 74
End: 2024-02-05
Payer: MEDICARE

## 2024-02-05 VITALS
DIASTOLIC BLOOD PRESSURE: 87 MMHG | BODY MASS INDEX: 30.31 KG/M2 | OXYGEN SATURATION: 90 % | WEIGHT: 171.08 LBS | HEART RATE: 70 BPM | HEIGHT: 63 IN | SYSTOLIC BLOOD PRESSURE: 171 MMHG

## 2024-02-05 DIAGNOSIS — S52.502D CLOSED FRACTURE OF DISTAL ENDS OF LEFT RADIUS AND ULNA WITH ROUTINE HEALING, SUBSEQUENT ENCOUNTER: Primary | ICD-10-CM

## 2024-02-05 DIAGNOSIS — M25.532 LEFT WRIST PAIN: ICD-10-CM

## 2024-02-05 DIAGNOSIS — S52.602D CLOSED FRACTURE OF DISTAL ENDS OF LEFT RADIUS AND ULNA WITH ROUTINE HEALING, SUBSEQUENT ENCOUNTER: Primary | ICD-10-CM

## 2024-02-05 PROCEDURE — 1159F MED LIST DOCD IN RCRD: CPT | Performed by: PHYSICIAN ASSISTANT

## 2024-02-05 PROCEDURE — 99024 POSTOP FOLLOW-UP VISIT: CPT | Performed by: PHYSICIAN ASSISTANT

## 2024-02-05 PROCEDURE — 1160F RVW MEDS BY RX/DR IN RCRD: CPT | Performed by: PHYSICIAN ASSISTANT

## 2024-02-05 PROCEDURE — 3077F SYST BP >= 140 MM HG: CPT | Performed by: PHYSICIAN ASSISTANT

## 2024-02-05 PROCEDURE — 3079F DIAST BP 80-89 MM HG: CPT | Performed by: PHYSICIAN ASSISTANT

## 2024-02-05 NOTE — PROGRESS NOTES
"Chief Complaint  Pain and Follow-up of the Left Wrist    Subjective          History of Present Illness      Payal Singer is a 73 y.o. female  presents to [facility] for     Patient presents for follow-up evaluation of left distal radius fracture and ulnar styloid fracture.  She is here with her family.  Patient states that her wrist is still is \"sore \"with activity she presents with her brace she states she wears the brace off and on throughout the day.  She mainly takes Tylenol if needed for pain.  She points to the ulnar side of her wrist as her area of worst pain she states she has been working on home exercises and wrist range of motion.      Allergies   Allergen Reactions    Floxin Otic [Ofloxacin] Rash        Social History     Socioeconomic History    Marital status: Unknown   Tobacco Use    Smoking status: Never    Smokeless tobacco: Never   Vaping Use    Vaping Use: Never used   Substance and Sexual Activity    Alcohol use: Never    Drug use: Never    Sexual activity: Not Currently        REVIEW OF SYSTEMS    Constitutional: Awake alert and oriented x3, no acute distress, denies fevers, chills, weight loss  Respiratory: No respiratory distress  Vascular: Brisk cap refill, Intact distal pulses, No cyanosis, compartments soft with no signs or symptoms of compartment syndrome or DVT.   Cardiovascular: Denies chest pain, shortness of breath  Skin: Denies rashes, acute skin changes  Neurologic: Denies headache, loss of consciousness  MSK: Left wrist pain      Objective   Vital Signs:   /87   Pulse 70   Ht 160 cm (63\")   Wt 77.6 kg (171 lb 1.2 oz)   SpO2 90%   BMI 30.30 kg/m²     Body mass index is 30.3 kg/m².    Physical Exam       Left wrist: Skin is intact, no erythema, no ecchymosis or swelling, no signs of infection or skin irritation, tender palpation of ulnar side of the wrist, nontender at fracture site of the radius.  Full wrist range of motion with flexion " extension ulnar radial deviation patient will make a full fist, finger range of motion intact/appropriate, capillary fill less than 3 seconds, 5 out of 5  strength      Procedures    Imaging Results (Most Recent)       Procedure Component Value Units Date/Time    XR Wrist 2 View Left [[id number]] Resulted: 02/05/24 1209     Updated: 02/05/24 1209    Narrative:      View:AP/Lateral view(s)  Site: Left wrist  Indication: Left wrist pain  Study: X-rays ordered, taken in the office, and reviewed today  X-ray: Well-healed distal radius fracture, healing ulnar styloid fracture,   no increased displacement or angulation  Comparative data: Previous studies             Result Review :   The following data was reviewed by: RICK Arenas on 02/05/2024:  Data reviewed : Radiologic studies previous studies              Assessment and Plan    Diagnoses and all orders for this visit:    1. Closed fracture of distal ends of left radius and ulna with routine healing, subsequent encounter (Primary)    2. Left wrist pain  -     XR Wrist 2 View Left        Reviewed x-rays with the patient, based on history and physical exam and x-ray findings patient and family were advised she can discontinue brace use as tolerated, continue range of motion strengthening as tolerated follow-up as needed, they agreed    Call or return if worsening symptoms.    Follow Up   Return in about 4 weeks (around 3/4/2024) for Recheck.  Patient was given instructions and counseling regarding her condition or for health maintenance advice. Please see specific information pulled into the AVS if appropriate.       EMR Dragon/Transcription disclaimer:  Much of this encounter note is an electronic transcription/translation of spoken language to printed text, aka voice recognition.  The electronic translation of spoken language may permit erroneous or at times nonsensical words or phrases to be inadvertently transcribed; although I have reviewed the  note for such errors, some may still exist so please interpret based on surrounding text content.

## 2024-02-07 RX ORDER — LEVOTHYROXINE SODIUM 0.07 MG/1
TABLET ORAL
Qty: 90 TABLET | Refills: 1 | Status: SHIPPED | OUTPATIENT
Start: 2024-02-07

## 2024-02-26 ENCOUNTER — TELEMEDICINE (OUTPATIENT)
Dept: PSYCHIATRY | Facility: CLINIC | Age: 74
End: 2024-02-26
Payer: MEDICARE

## 2024-02-26 DIAGNOSIS — F33.40 RECURRENT MAJOR DEPRESSIVE DISORDER IN REMISSION: ICD-10-CM

## 2024-02-26 DIAGNOSIS — R44.3 HALLUCINATIONS: ICD-10-CM

## 2024-02-26 DIAGNOSIS — F41.1 GENERALIZED ANXIETY DISORDER: Primary | ICD-10-CM

## 2024-02-26 DIAGNOSIS — R68.89 FORGETFULNESS: ICD-10-CM

## 2024-02-26 DIAGNOSIS — M79.2 NEUROPATHIC PAIN: ICD-10-CM

## 2024-02-26 PROCEDURE — 99214 OFFICE O/P EST MOD 30 MIN: CPT | Performed by: STUDENT IN AN ORGANIZED HEALTH CARE EDUCATION/TRAINING PROGRAM

## 2024-02-26 PROCEDURE — 1160F RVW MEDS BY RX/DR IN RCRD: CPT | Performed by: STUDENT IN AN ORGANIZED HEALTH CARE EDUCATION/TRAINING PROGRAM

## 2024-02-26 PROCEDURE — 1159F MED LIST DOCD IN RCRD: CPT | Performed by: STUDENT IN AN ORGANIZED HEALTH CARE EDUCATION/TRAINING PROGRAM

## 2024-02-26 PROCEDURE — 90833 PSYTX W PT W E/M 30 MIN: CPT | Performed by: STUDENT IN AN ORGANIZED HEALTH CARE EDUCATION/TRAINING PROGRAM

## 2024-02-26 RX ORDER — VENLAFAXINE HYDROCHLORIDE 150 MG/1
150 CAPSULE, EXTENDED RELEASE ORAL DAILY
Qty: 90 CAPSULE | Refills: 1 | Status: SHIPPED | OUTPATIENT
Start: 2024-02-26

## 2024-02-26 RX ORDER — GABAPENTIN 300 MG/1
300 CAPSULE ORAL 2 TIMES DAILY
Qty: 60 CAPSULE | Refills: 5 | Status: SHIPPED | OUTPATIENT
Start: 2024-02-26

## 2024-02-26 NOTE — PROGRESS NOTES
"Subjective   Payal Singer is a 73 y.o. female who presents today for follow up    Chief Complaint:  mdd meagan    History of Present Illness:     Chart review 4/15: Seen 3/30 to establish care. Hx of anx/dep avh in [place]. on both sertraline and venlafaxine, was on Seroquel. Hx of HTN, GERD. On gabapentin 600 tid, sertraline 200 d, venla 150 d, quetiap 25 qhs. March 2021 labs: cbc shows low mch, high rdw; Cr 1.15 high, abnormal lipids, TSH and fT4 wnl. No OP head imaging, ekg.     \"Payal\" and \"Meagan\" and \"Ck\" her       2/26: Virtual visit via Zoom audio and video due to the COVID-19 pandemic.  Patient is accepting of and agreeable to visit.  The visit consisted of the patient and I. Patient is at home, and I am at the office:  Chart review:   2/26: UC frx of left ulna, ortho x3,   Int med, reassuring rpr/b12/vit D/hcv/hyroid studies/cbc. Cmp: elev gluc 103, cr 1.04, ap 137.  No visits.  no new  Planning:   10/26: Now sleeping. Discussed further strategies for , such as proactively getting him his meds every morning. Meagan is in agreement. Discuss sleep study next visit.  9/27: Trouble snoring, maintenance insomnia. Sleep study. Start melatonin. Fear of spiders getting on the bed, but no AVH (but history of them).  7/25: Behavioral strategies have helped patient. Patient still not getting out of the house, agrees to try at least 1x/wk. Stable, nearly at goal.   Stable, well. Discussed behavioral strategies to manage 's behavior.  Calming:  Children or friends calling on the phone  Meals available (food)  snacks  \"My  broke his back, fell in the bathroom.\"  I also broke my wrist. I was carrying some garbage and I guess it was too heavy and I slipped and fell. No surgery needed.  :   Now with broken back, one challenge is physically helping him around  Has a bad temper, even worse now that he's dependent.  Meagan: I've been giving his meds, but I don't think they are helping " "much  Using food/snacks to motivate him  Apple pie  Vanilla ice cream, blue bell  Mood/Depression: stable MDD, mood  Anxiety: stable ESHA  Panic attacks: n  Energy: baseline low  Concentration: baseline low  Sleeping: stable insomnia  Eatin lbs  Refills: y  Substances: def  Therapy: n  Medication compliant: y  SE: n  No SI HI AVH.      10/26: In person interview:  Chart review:   Int med, reassuring rpr/b12/vit D/hcv/hyroid studies/cbc. Cmp: elev gluc 103, cr 1.04, ap 137.  No visits.  no new  Plannin/27: Trouble snoring, maintenance insomnia. Sleep study. Start melatonin. Fear of spiders getting on the bed, but no AVH (but history of them).  : Behavioral strategies have helped patient. Patient still not getting out of the house, agrees to try at least 1x/wk. Stable, nearly at goal.   Stable, well. Discussed behavioral strategies to manage 's behavior.  Calming:  Children or friends calling on the phone  Meals available (food)  snacks  \"I'm ok.\"  :   Behavior is related to compliance. We discussed strategies for compliance, such as helping give him his am meds.  Snacks, meals, calls from children and friends helping keep him calm  I'm doing pretty good. My mood depends on his mood.  Mood/Depression: good mood  Anxiety: stable worrying  Panic attacks: n  Energy: baseline low  Concentration: baseline low  Sleeping: sleeping pretty good.  Eating: stable  Refills: y  Substances: def  Therapy: n  Medication compliant: y  SE: n  No SI HI AVH.      : In person interview:  Chart review:   No visits.  no new  Plannin/25: Behavioral strategies have helped patient. Patient still not getting out of the house, agrees to try at least 1x/wk. Stable, nearly at goal.   Stable, well. Discussed behavioral strategies to manage 's behavior.  Calming:  Children or friends calling on the phone  Meals available (food)  snacks  \"I'm good.\"  : \"he's fair. He can't do anything anymore.\" " "Wheelchair bound for the last 2-3 months. Due to falls.  Meagan: \"they helped a little.\" The food helped. The meds help.  Mood/Depression: good mood  Anxiety: stable worrying  Panic attacks: n  Energy: baseline low  Concentration: baseline low  Sleeping: maintenance insomnia x1 year (told me today)  Eating: stable  Refills: y  Substances: def  Therapy: n  Medication compliant: y  SE: n  No SI HI AVH.      7/25: In person interview:  Chart review: no new  Planning: Stable, well. Discussed behavioral strategies to manage 's behavior.  Calming:  Children or friends calling on the phone  Meals available (food)  snacks  \"We did a different menu.\"  \"He just seems like he's feeling better.\"  Meagan: \"they helped a little.\" The food helped. The meds help.  Mood/Depression: good mood  Anxiety: less anxious, on ege  Panic attacks: n  Energy: baseline low  Concentration: baseline low  Sleeping: well  Eating: stable  Refills: y  Substances: def  Therapy: n  Medication compliant: y  SE: n  No SI HI AVH.        6/20: In person.  Interview:  Chart review: no new.  Planning: Stable. Couldn't figure out Twilio. Short visit. See back in person in 6 wks to discuss strategies for mx behavioral concerns.   \"Yeah, it's my .\"  He yells at me.  Triggers:   Moving helped. More space.   may have PTSD.  He may be medication noncompliant.  Calming:  Children or friends calling on the phone  Meals available (food)  snacks  Mood/Depression: I'm doing ok  Anxiety: good, stable  Panic attacks: n  Energy: baseline down  Concentration: baseline down  Sleeping: fairly well  Eating: stable  Refills: y  Substances: n  Therapy: n  Medication compliant: y  SE: n  No SI HI AVH.        5/1: Virtual visit via Zoom audio and video due to the COVID-19 pandemic.  Patient is accepting of and agreeable to visit.  The visit consisted of the patient and I. The patient is at home, and I am at the office.  Interview:  Chart review: No " "new.  Planning: Seroquel dose in the afternoon helps, but the real problem is a situational stressor that cannot be solved with the medication.  We brainstormed possible ideas regarding how to manage patient's 's behavior.  We discussed having him accompany patient to her appointments to develop familiarity with me in order for me to potentially take over his medications.  They do not think that will help.   \"I've been pretty good.\"  We moved to another house this Saturday.  More room, but 's behavior hasn't changed. It has a basement.  Meagan: we should set up an appnt in person.  They are both in the walkout basement. They can't take stairs. They are around each constantly.  In person: would be more open.  Mood/Depression: stable  Anxiety: stable  Panic attacks: n  Energy: stable   Concentration: stable  Sleeping: well  Eating: stable weight  Refills: n  Substances: n  Therapy: n  Medication compliant: y  SE:   No SI HI AVH.    ...      Previous:  Still having panic attacks once a week. Sx: soa, sweating, palpitations, tachycardia, fear, no derealization/depersonalization, last 30 min, leaves room where she was at. No triggers. Fear of another panic attack happening.     Anxiety due to 's health; admitted to hospital recently for dehydration. Notes uncontrolled worrying, muscle tension, irritability, restlessness, trouble sleeping - will wake at 3 am and can't go back to sleep. Duration is at least 6 months, since moved here.    Still having hallucinations every night before she goes to bed. Sees shadows walk past bed. No AH. Sometimes sees spiders during the day on the floor; happens once or twice a week.     Seroquel helped to some extent.     Again, patient is somewhat of a poor historian. Often times she will answer a question with \"no,\" and then contradict her self moments later.    Collateral from Meagan: patient did have a UTI (UA confirmed); was put on a course of abx. Feels her mom's " confusion has improved since then.      Past Surgical History:  Past Surgical History:   Procedure Laterality Date    CATARACT EXTRACTION  2002    CHOLECYSTECTOMY OPEN  1971       Problem List:  Patient Active Problem List   Diagnosis    Forgetfulness    Anxiety    Depression    Esophageal reflux    Hallucinations    Head injury    Hypertension    Other acute sinusitis    PTSD (post-traumatic stress disorder)    Mixed hyperlipidemia    Acquired hypothyroidism    Vitamin D deficiency    Neuropathic pain    Closed fracture of left distal radius and ulna       Allergy:   Allergies   Allergen Reactions    Floxin Otic [Ofloxacin] Rash        Discontinued Medications:  Medications Discontinued During This Encounter   Medication Reason    venlafaxine XR (EFFEXOR-XR) 150 MG 24 hr capsule Reorder    gabapentin (NEURONTIN) 300 MG capsule Reorder       Current Medications:   Current Outpatient Medications   Medication Sig Dispense Refill    gabapentin (NEURONTIN) 300 MG capsule Take 1 capsule by mouth 2 (Two) Times a Day. 60 capsule 5    venlafaxine XR (EFFEXOR-XR) 150 MG 24 hr capsule Take 1 capsule by mouth Daily. 90 capsule 1    acetaminophen (TYLENOL) 500 MG tablet Take 1 tablet by mouth Every 6 (Six) Hours As Needed for Mild Pain.      Ascorbic Acid (Vitamin C) 250 MG chewable tablet Chew.      atorvastatin (LIPITOR) 40 MG tablet Take 1 tablet by mouth Daily. 90 tablet 1    HYDROcodone-acetaminophen (Norco) 7.5-325 MG per tablet Take 1 tablet by mouth Every 6 (Six) Hours As Needed for Moderate Pain. 18 tablet 0    levothyroxine (SYNTHROID, LEVOTHROID) 75 MCG tablet TAKE 1 TABLET BY MOUTH EVERY DAY 90 tablet 1    lisinopril (PRINIVIL,ZESTRIL) 10 MG tablet Take 1 tablet by mouth Daily. 90 tablet 1    Melatonin 10 MG tablet Take 1 tablet by mouth Every Night.      metoprolol tartrate (LOPRESSOR) 25 MG tablet TAKE 2 TABLETS BY MOUTH DAILY FOR 90 DAYS. 180 tablet 0    omeprazole (priLOSEC) 40 MG capsule Take 1 capsule by mouth  "Daily. 90 capsule 1    QUEtiapine (SEROquel) 25 MG tablet 25 MG (1 TAB) AT 2PM, 50 MG (2 TAB) NIGHTLY 180 tablet 3    vitamin D (ERGOCALCIFEROL) 1.25 MG (00066 UT) capsule capsule TAKE 1 CAPSULE BY MOUTH 1 TIME PER WEEK. 13 capsule 1    Zinc Sulfate (ZINC 15 PO) Take  by mouth.       No current facility-administered medications for this visit.       Past Medical History:  Past Medical History:   Diagnosis Date    Acid reflux     Allergies     Anxiety     Depression     Forgetfulness     Hallucinations 04/15/2021    Head injury     HTN (hypertension)     PTSD (post-traumatic stress disorder)          Social History     Socioeconomic History    Marital status: Unknown   Tobacco Use    Smoking status: Never    Smokeless tobacco: Never   Vaping Use    Vaping Use: Never used   Substance and Sexual Activity    Alcohol use: Never    Drug use: Never    Sexual activity: Not Currently         Family History   Problem Relation Age of Onset    Stroke Mother     Heart disease Mother     Stroke Father     Heart disease Father     Diabetes Father     Stroke Sister     Diabetes Sister     Stroke Brother     Diabetes Brother     Diabetes Other         AUNT/UNCLE    Anxiety disorder Other        Mental Status Exam:   Hygiene:   good, at home  Cooperation:  Cooperative  Eye Contact:  Good  Psychomotor Behavior:  Appropriate  Affect:  Appropriate, mood congruent, good variability  Mood: \"I'm good\"  Speech:  Normal  Thought Process:  Goal directed  Thought Content:  Normal and Mood congruent  Suicidal:  None  Homicidal:  None  Hallucinations:  None  Delusion:  None  Memory:  Deficits  Orientation:  Grossly intact  Reliability:  fair  Insight:  Fair  Judgement:  Fair  Impulse Control:  Fair  Physical/Medical Issues:  No      Review of Systems:  Review of Systems   Constitutional:  Positive for fatigue. Negative for diaphoresis.   HENT:  Negative for drooling.    Eyes:  Negative for visual disturbance.   Respiratory:  Positive for cough. " Negative for shortness of breath.    Cardiovascular:  Negative for chest pain, palpitations and leg swelling.   Gastrointestinal:  Negative for diarrhea, nausea and vomiting.   Endocrine: Negative for cold intolerance and heat intolerance.   Genitourinary:  Negative for difficulty urinating.   Musculoskeletal:  Negative for joint swelling.   Allergic/Immunologic: Negative for immunocompromised state.   Neurological:  Positive for light-headedness. Negative for dizziness, seizures, syncope, speech difficulty, numbness and headaches.   Hematological:  Negative for adenopathy.   Psychiatric/Behavioral:  Positive for sleep disturbance.          Physical Exam:  Physical Exam    Vital Signs:   There were no vitals taken for this visit.     Lab Results:   Office Visit on 09/28/2023   Component Date Value Ref Range Status    Glucose 09/28/2023 103 (H)  65 - 99 mg/dL Final    BUN 09/28/2023 14  8 - 23 mg/dL Final    Creatinine 09/28/2023 1.04 (H)  0.57 - 1.00 mg/dL Final    Sodium 09/28/2023 140  136 - 145 mmol/L Final    Potassium 09/28/2023 4.0  3.5 - 5.2 mmol/L Final    Chloride 09/28/2023 103  98 - 107 mmol/L Final    CO2 09/28/2023 28.0  22.0 - 29.0 mmol/L Final    Calcium 09/28/2023 9.0  8.6 - 10.5 mg/dL Final    Total Protein 09/28/2023 7.0  6.0 - 8.5 g/dL Final    Albumin 09/28/2023 3.8  3.5 - 5.2 g/dL Final    ALT (SGPT) 09/28/2023 7  1 - 33 U/L Final    AST (SGOT) 09/28/2023 12  1 - 32 U/L Final    Alkaline Phosphatase 09/28/2023 137 (H)  39 - 117 U/L Final    Total Bilirubin 09/28/2023 0.4  0.0 - 1.2 mg/dL Final    Globulin 09/28/2023 3.2  gm/dL Final    A/G Ratio 09/28/2023 1.2  g/dL Final    BUN/Creatinine Ratio 09/28/2023 13.5  7.0 - 25.0 Final    Anion Gap 09/28/2023 9.0  5.0 - 15.0 mmol/L Final    eGFR 09/28/2023 56.5 (L)  >60.0 mL/min/1.73 Final    TSH 09/28/2023 0.763  0.270 - 4.200 uIU/mL Final    Total Cholesterol 09/28/2023 133  0 - 200 mg/dL Final    Triglycerides 09/28/2023 176 (H)  0 - 150 mg/dL Final     HDL Cholesterol 09/28/2023 46  40 - 60 mg/dL Final    LDL Cholesterol  09/28/2023 58  0 - 100 mg/dL Final    VLDL Cholesterol 09/28/2023 29  5 - 40 mg/dL Final    LDL/HDL Ratio 09/28/2023 1.13   Final    Free T4 09/28/2023 1.33  0.93 - 1.70 ng/dL Final    25 Hydroxy, Vitamin D 09/28/2023 60.1  30.0 - 100.0 ng/ml Final    Vitamin B-12 09/28/2023 368  211 - 946 pg/mL Final    RPR 09/28/2023 Non-Reactive  Non-Reactive Final    Hepatitis C Ab 09/28/2023 Non-Reactive  Non-Reactive Final    WBC 09/28/2023 7.16  3.40 - 10.80 10*3/mm3 Final    RBC 09/28/2023 4.42  3.77 - 5.28 10*6/mm3 Final    Hemoglobin 09/28/2023 12.2  12.0 - 15.9 g/dL Final    Hematocrit 09/28/2023 37.7  34.0 - 46.6 % Final    MCV 09/28/2023 85.3  79.0 - 97.0 fL Final    MCH 09/28/2023 27.6  26.6 - 33.0 pg Final    MCHC 09/28/2023 32.4  31.5 - 35.7 g/dL Final    RDW 09/28/2023 13.5  12.3 - 15.4 % Final    RDW-SD 09/28/2023 42.3  37.0 - 54.0 fl Final    MPV 09/28/2023 10.4  6.0 - 12.0 fL Final    Platelets 09/28/2023 292  140 - 450 10*3/mm3 Final    Neutrophil % 09/28/2023 81.2 (H)  42.7 - 76.0 % Final    Lymphocyte % 09/28/2023 10.3 (L)  19.6 - 45.3 % Final    Monocyte % 09/28/2023 6.4  5.0 - 12.0 % Final    Eosinophil % 09/28/2023 1.1  0.3 - 6.2 % Final    Basophil % 09/28/2023 0.4  0.0 - 1.5 % Final    Immature Grans % 09/28/2023 0.6 (H)  0.0 - 0.5 % Final    Neutrophils, Absolute 09/28/2023 5.81  1.70 - 7.00 10*3/mm3 Final    Lymphocytes, Absolute 09/28/2023 0.74  0.70 - 3.10 10*3/mm3 Final    Monocytes, Absolute 09/28/2023 0.46  0.10 - 0.90 10*3/mm3 Final    Eosinophils, Absolute 09/28/2023 0.08  0.00 - 0.40 10*3/mm3 Final    Basophils, Absolute 09/28/2023 0.03  0.00 - 0.20 10*3/mm3 Final    Immature Grans, Absolute 09/28/2023 0.04  0.00 - 0.05 10*3/mm3 Final    nRBC 09/28/2023 0.0  0.0 - 0.2 /100 WBC Final       EKG Results:  No orders to display       Imaging Results:  No Images in the past 120 days found..      Assessment & Plan   Diagnoses and  all orders for this visit:    1. Generalized anxiety disorder (Primary)  -     venlafaxine XR (EFFEXOR-XR) 150 MG 24 hr capsule; Take 1 capsule by mouth Daily.  Dispense: 90 capsule; Refill: 1    2. Forgetfulness    3. Neuropathic pain  -     gabapentin (NEURONTIN) 300 MG capsule; Take 1 capsule by mouth 2 (Two) Times a Day.  Dispense: 60 capsule; Refill: 5    4. Hallucinations    5. Neuropathic pain  Comments:  of scalp, chronic. will restart gabapentin--UDS, controlled substance agreement, and Mehdi reviewed today.  Orders:  -     gabapentin (NEURONTIN) 300 MG capsule; Take 1 capsule by mouth 2 (Two) Times a Day.  Dispense: 60 capsule; Refill: 5    6. Recurrent major depressive disorder in remission      Presentation most consistent with developing dementia, likely Lewy body dementia.  Patient also reports worsening gait abnormalities; she fell in the summer of last year, injuring her head, and requiring stitches.  Patient also has a history of depression, possible PTSD by review of chart.  Patient did not mention PTSD during the interview, but she is not a very good historian.  Often times she will say no to a question, and then contradict herself only a few moments later.  She has some trouble with knowing her medications or her medical conditions.    2/26: Stable, well, discussed strategy to manage 's behavior. Meagan will call Dr. Rivero to inform her of his behavior, then they will call me back. Once med changes are made, plan is to put  in good spirits by giving him sugar free ice cream (vanilla) first, then offering meds.    Allowed patient to freely discuss and process issues, such as:  Her history of AVH 2 years ago; none now.  Behavioral strategies to manage the household  Strategies for compliance with meds in the house (Meagan to try to manage the pill box)  ... using Tim (Carlos Verdugo) psychotherapeutic techniques including unconditional positive regard, reflective listening, and  demonstrating clear empathy.     Time (minutes) spent providing supportive psychotherapy: 16  (This time is exclusive to the therapy session and separate from the time spent on activities used to meet the criteria for the E/M service (history, exam, medical decision-making).)  Functional status: mild impairment  Treatment plan: Medication management and supportive psychotherapy  Prognosis: good  Progress: stable  2m    10/26: Now sleeping. Discussed further strategies for , such as proactively getting him his meds every morning. Meagan is in agreement. Discuss sleep study next visit.    9/27: Trouble snoring, maintenance insomnia. Sleep study. Start melatonin. Fear of spiders getting on the bed, but no AVH (but history of them).    7/25: Behavioral strategies have helped patient. Patient still not getting out of the house, agrees to try at least 1x/wk. Stable, nearly at goal.     6/20: Stable, well. Discussed behavioral strategies to manage 's behavior.     5/1: Stable. Couldn't figure out Twilio. Short visit. See back in person in 6 wks to discuss strategies for mx behavioral concerns.     1/31: Seroquel dose in the afternoon helps, but the real problem is a situational stressor that cannot be solved with the medication.  We brainstormed possible ideas regarding how to manage patient's 's behavior.  We discussed having him accompany patient to her appointments to develop familiarity with me in order for me to potentially take over her medications.  They do not think that will help.      11/30: Buspar not helpful. Stop. Still anxious and depressed at 3 - 7 pm, strangely. Target with a dose of seroquel.  Can also try increasing frequency of gabapentin, low-dose trazodone.      8/31: Pt's anxiety comes from taking care of her father, per Meagan. He's dealing with depression. Meagan is stressed. Discussed getting more help to take care of pt and her father. Pt is better. No changes to meds.      7/19: Increase effexor to target residual anx and dep.  Consider increasing BuSpar soon at next appointment.      4/19: Patient is well and right where she should be.     3/8: Target the feeling of anxiety at 4 PM by starting BuSpar at 3 PM.  Consider switching to gabapentin if BuSpar does not help.  Would start at a low dose of 100 mg.      11/8: Depressed in the mornings. Light box, increase venlafaxine.     9/7: Doing even better.  8 weeks.  No changes.    7/27: Markedly improved, and far more linear.  Still has residual anxiety and depression.  Hallucinations are still present but they do not cause distress at all to the patient.  Continue Seroquel at the present dose.  Double the dose of venlafaxine. See back in 6 weeks.  This was also discussed with Meagan, patient's daughter.    Previous:  Caution again advised in terms of increasing the dose given the likely sensitivity she has to antipsychotics.  Psychotherapy is deferred at this time.  S/p referral to neurologist is appropriate for further management.     Visit Diagnoses:    ICD-10-CM ICD-9-CM   1. Generalized anxiety disorder  F41.1 300.02   2. Forgetfulness  R68.89 780.99   3. Neuropathic pain  M79.2 729.2   4. Hallucinations  R44.3 780.1   5. Neuropathic pain  M79.2 729.2   6. Recurrent major depressive disorder in remission  F33.40 296.35       PLAN:  Risk Assessment: Risk of self-harm acutely is low. Risk factors include anxiety disorder, depressive disorder, access to weapons, recent psychosocial stressors (pandemic). Protective factors include no family history, no present SI, no history of suicide attempts or self-harm in the past, no access to weapons, no AODA, healthcare seeking, future orientation, willingness to engage in care. Risk of self-harm chronically is also low, but could be further elevated in the event of treatment noncompliance and/or AODA.  Safety: No acute safety concerns.  Medications:   CONTINUE melatonin 10 mg qhs. Risks,  benefits, side effects discussed with patient including sedation, dizziness/falls risk, GI upset.  Do not use before operating vehicle, vessel, or machine. After discussion of these risks and benefits, the patient voiced understanding and agreed to proceed.   CONTINUE quetiapine 50 mg nightly, 25 mg q2pm. Risks, benefits, alternatives discussed with patient including nausea and vomiting, GI upset, sedation, akathisia, hypotension, increased appetite, lowering of seizure threshold, theoretical risk of tardive dyskinesia, extrapyramidal symptoms, restless legs syndrome. After discussion of these risks and benefits, the patient voiced understanding and agreed to proceed.  CONTINUE venlafaxine 150 mg PO QDAY. Risks, benefits, alternatives discussed with patient including GI upset, nausea vomiting diarrhea, theoretical decrease of seizure threshold predisposing the patient to a slightly higher seizure risk, headaches, sexual dysfunction, serotonin syndrome, bleeding risk, increased suicidality in patients 24 years and younger.  After discussion of these risks and benefits, the patient voiced understanding and agreed to proceed.  CONTINUE with gabapentin 300 twice daily. Risks, benefits, alternatives discussed with patient including sedation, dizziness/falls risk, GI upset, weight gain.  After discussion of these risks and benefits, the patient voiced understanding and agreed to proceed. UDS ordered, Mehdi reviewed.  S/P:  BuSpar 10 mg at 3 PM daily. Ineffective.  sertraline 100 mg daily.  Not effective.  Therapy: Deferred.  Other: s/p referral to neurology for workup of likely dementia.      TREATMENT PLAN/GOALS: Continue supportive psychotherapy efforts and medications as indicated. Treatment and medication options discussed during today's visit. Patient ackowledged and verbally consented to continue with current treatment plan and was educated on the importance of compliance with treatment and follow-up  appointments.    MEDICATION ISSUES:  ERNESTINA reviewed as expected.  Discussed medication options and treatment plan of prescribed medication as well as the risks, benefits, and side effects including potential falls, possible impaired driving and metabolic adversities among others. Patient is agreeable to call the office with any worsening of symptoms or onset of side effects. Patient is agreeable to call 911 or go to the nearest ER should he/she begin having SI/HI. No medication side effects or related complaints today.     MEDS ORDERED DURING VISIT:  New Medications Ordered This Visit   Medications    venlafaxine XR (EFFEXOR-XR) 150 MG 24 hr capsule     Sig: Take 1 capsule by mouth Daily.     Dispense:  90 capsule     Refill:  1    gabapentin (NEURONTIN) 300 MG capsule     Sig: Take 1 capsule by mouth 2 (Two) Times a Day.     Dispense:  60 capsule     Refill:  5       Return in about 2 months (around 4/26/2024) for Video visit.         This document has been electronically signed by Riki Peterson MD  February 26, 2024 18:39 EST      Part of this note may be an electronic transcription/translation of spoken language to printed text using the Dragon Dictation System.

## 2024-02-26 NOTE — PATIENT INSTRUCTIONS
1.  Please return to clinic at your next scheduled visit.  Contact the clinic ([phone]) at least 24 hours prior in the event you need to cancel.  2.  Do no harm to yourself or others.    3.  Avoid alcohol and drugs.    4.  Take all medications as prescribed.  Please contact the clinic with any concerns. If you are in need of medication refills, please call the clinic at [phone].    5. Should you want to get in touch with your provider, Dr. Riki Peterson, please utilize [employer] or contact the office ([phone]), and staff will be able to page Dr. Peterson directly.  6.  In the event you have personal crisis, contact the following crisis numbers: Suicide Prevention Hotline 1-844.178.3710; TATE Helpline 7-699-352-TATE; [facility] Emergency Room [phone]; text HELLO to 768913; or 008.

## 2024-03-08 RX ORDER — ATORVASTATIN CALCIUM 40 MG/1
40 TABLET, FILM COATED ORAL DAILY
Qty: 90 TABLET | Refills: 1 | Status: SHIPPED | OUTPATIENT
Start: 2024-03-08

## 2024-03-08 RX ORDER — OMEPRAZOLE 40 MG/1
40 CAPSULE, DELAYED RELEASE ORAL DAILY
Qty: 90 CAPSULE | Refills: 1 | Status: SHIPPED | OUTPATIENT
Start: 2024-03-08

## 2024-03-08 RX ORDER — LISINOPRIL 10 MG/1
10 TABLET ORAL DAILY
Qty: 90 TABLET | Refills: 1 | Status: SHIPPED | OUTPATIENT
Start: 2024-03-08

## 2024-04-10 DIAGNOSIS — R44.3 HALLUCINATIONS: ICD-10-CM

## 2024-04-10 RX ORDER — QUETIAPINE FUMARATE 25 MG/1
TABLET, FILM COATED ORAL
Qty: 90 TABLET | Refills: 0 | Status: SHIPPED | OUTPATIENT
Start: 2024-04-10

## 2024-05-14 DIAGNOSIS — R44.3 HALLUCINATIONS: ICD-10-CM

## 2024-05-14 RX ORDER — QUETIAPINE FUMARATE 25 MG/1
TABLET, FILM COATED ORAL
Qty: 90 TABLET | Refills: 5 | Status: SHIPPED | OUTPATIENT
Start: 2024-05-14

## 2024-05-14 NOTE — TELEPHONE ENCOUNTER
NEXT VISIT WITH PROVIDER   Appointment with Riki Peterson MD (05/24/2024)     LAST SEEN BY PROVIDER   Telemedicine with Riki Peterson MD (02/26/2024)     LAST MED REFILL  QUEtiapine (SEROquel) 25 MG tablet (04/10/2024)     PROVIDER PLEASE ADVISE

## 2024-05-23 NOTE — PROGRESS NOTES
"Subjective   Payal Singer is a 74 y.o. female who presents today for follow up    Chief Complaint:  mdd esha    History of Present Illness:     Initial Chart review 4/15/21: Seen 3/30 to establish care. Hx of anx/dep avh in [place]. on both sertraline and venlafaxine, was on Seroquel. Hx of HTN, GERD. On gabapentin 600 tid, sertraline 200 d, venla 150 d, quetiap 25 qhs. 2021 labs: cbc shows low mch, high rdw; Cr 1.15 high, abnormal lipids, TSH and fT4 wnl. No OP head imaging, ekg.     Discussed behavioral strategies to manage 's behavior.  Calming:  Children or friends calling on the phone  Meals available (food)  snacks    \"Payal\" and \"Meagan\" and \"Ck\" her     Chart review:   24: no visits.  : UC frx of left ulna, ortho x3,   Int med, reassuring rpr/b12/vit D/hcv/hyroid studies/cbc. Cmp: elev gluc 103, cr 1.04, ap 137.  No visits.  no new      Visits (Below):    2024: Virtual visit via Zoom audio and video due to the COVID-19 pandemic.  Patient is accepting of and agreeable to visit.  The visit consisted of the patient and I. Patient is at home, and I am at the office.  Interview:  Plannin/26: Stable, well, discussed strategy to manage 's behavior. Meagan will call Dr. Rivero to inform her of his behavior, then they will call me back. Once med changes are made, plan is to put  in good spirits by giving him sugar free ice cream (vanilla) first, then offering meds.  10/26: Now sleeping. Discussed further strategies for , such as proactively getting him his meds every morning. Meagan is in agreement. Discuss sleep study next visit.  \"He keeps asking the same question again and again.\"  Aggression hasn't been bad at alI in weeks.  Meagan: he's now very dependent.  Mood/Depression: stable MDD, mood  Anxiety: stable ESHA  Panic attacks: n  Energy: baseline low  Concentration: baseline low  Insomnia: stable  Eatin lbs  Refills: y  Substances: def  Therapy: " "n  Medication compliant: y  SE: n  No SI HI AVH.      : Virtual visit via Zoom audio and video due to the COVID-19 pandemic.  Patient is accepting of and agreeable to visit.  The visit consisted of the patient and I. Patient is at home, and I am at the office:  Chart review:   : UC frx of left ulna, ortho x3,   Int med, reassuring rpr/b12/vit D/hcv/hyroid studies/cbc. Cmp: elev gluc 103, cr 1.04, ap 137.  No visits.  no new  Planning:   10/26: Now sleeping. Discussed further strategies for , such as proactively getting him his meds every morning. Meagan is in agreement. Discuss sleep study next visit.  : Trouble snoring, maintenance insomnia. Sleep study. Start melatonin. Fear of spiders getting on the bed, but no AVH (but history of them).  : Behavioral strategies have helped patient. Patient still not getting out of the house, agrees to try at least 1x/wk. Stable, nearly at goal.   Stable, well. Discussed behavioral strategies to manage 's behavior.  Calming:  Children or friends calling on the phone  Meals available (food)  snacks  \"My  broke his back, fell in the bathroom.\"  I also broke my wrist. I was carrying some garbage and I guess it was too heavy and I slipped and fell. No surgery needed.  :   Now with broken back, one challenge is physically helping him around  Has a bad temper, even worse now that he's dependent.  Meagan: I've been giving his meds, but I don't think they are helping much  Using food/snacks to motivate him  Apple pie  Vanilla ice cream, blue bell  Mood/Depression: stable MDD, mood  Anxiety: stable ESHA  Panic attacks: n  Energy: baseline low  Concentration: baseline low  Sleeping: stable insomnia  Eatin lbs  Refills: y  Substances: def  Therapy: n  Medication compliant: y  SE: n  No SI HI AVH.      10/26: In person interview:  Chart review:   Int med, reassuring rpr/b12/vit D/hcv/hyroid studies/cbc. Cmp: elev gluc 103, cr 1.04, ap 137.  No " "visits.  no new  Plannin/27: Trouble snoring, maintenance insomnia. Sleep study. Start melatonin. Fear of spiders getting on the bed, but no AVH (but history of them).  : Behavioral strategies have helped patient. Patient still not getting out of the house, agrees to try at least 1x/wk. Stable, nearly at goal.   Stable, well. Discussed behavioral strategies to manage 's behavior.  Calming:  Children or friends calling on the phone  Meals available (food)  snacks  \"I'm ok.\"  :   Behavior is related to compliance. We discussed strategies for compliance, such as helping give him his am meds.  Snacks, meals, calls from children and friends helping keep him calm  I'm doing pretty good. My mood depends on his mood.  Mood/Depression: good mood  Anxiety: stable worrying  Panic attacks: n  Energy: baseline low  Concentration: baseline low  Sleeping: sleeping pretty good.  Eating: stable  Refills: y  Substances: def  Therapy: n  Medication compliant: y  SE: n  No SI HI AVH.      : In person interview:  Chart review:   No visits.  no new  Plannin/25: Behavioral strategies have helped patient. Patient still not getting out of the house, agrees to try at least 1x/wk. Stable, nearly at goal.   Stable, well. Discussed behavioral strategies to manage 's behavior.  Calming:  Children or friends calling on the phone  Meals available (food)  snacks  \"I'm good.\"  : \"he's fair. He can't do anything anymore.\" Wheelchair bound for the last 2-3 months. Due to falls.  Meagan: \"they helped a little.\" The food helped. The meds help.  Mood/Depression: good mood  Anxiety: stable worrying  Panic attacks: n  Energy: baseline low  Concentration: baseline low  Sleeping: maintenance insomnia x1 year (told me today)  Eating: stable  Refills: y  Substances: def  Therapy: n  Medication compliant: y  SE: n  No SI HI AVH.      : In person interview:  Chart review: no new  Planning: Stable, well. " "Discussed behavioral strategies to manage 's behavior.  Calming:  Children or friends calling on the phone  Meals available (food)  snacks  \"We did a different menu.\"  \"He just seems like he's feeling better.\"  Meagan: \"they helped a little.\" The food helped. The meds help.  Mood/Depression: good mood  Anxiety: less anxious, on ege  Panic attacks: n  Energy: baseline low  Concentration: baseline low  Sleeping: well  Eating: stable  Refills: y  Substances: def  Therapy: n  Medication compliant: y  SE: n  No SI HI AVH.        6/20: In person.  Interview:  Chart review: no new.  Planning: Stable. Couldn't figure out Twilio. Short visit. See back in person in 6 wks to discuss strategies for mx behavioral concerns.   \"Yeah, it's my .\"  He yells at me.  Triggers:   Moving helped. More space.   may have PTSD.  He may be medication noncompliant.  Calming:  Children or friends calling on the phone  Meals available (food)  snacks  Mood/Depression: I'm doing ok  Anxiety: good, stable  Panic attacks: n  Energy: baseline down  Concentration: baseline down  Sleeping: fairly well  Eating: stable  Refills: y  Substances: n  Therapy: n  Medication compliant: y  SE: n  No SI HI AVH.        5/1: Virtual visit via Zoom audio and video due to the COVID-19 pandemic.  Patient is accepting of and agreeable to visit.  The visit consisted of the patient and I. The patient is at home, and I am at the office.  Interview:  Chart review: No new.  Planning: Seroquel dose in the afternoon helps, but the real problem is a situational stressor that cannot be solved with the medication.  We brainstormed possible ideas regarding how to manage patient's 's behavior.  We discussed having him accompany patient to her appointments to develop familiarity with me in order for me to potentially take over his medications.  They do not think that will help.   \"I've been pretty good.\"  We moved to another house this Saturday.  More " "room, but 's behavior hasn't changed. It has a basement.  Meagan: we should set up an appnt in person.  They are both in the walkout basement. They can't take stairs. They are around each constantly.  In person: would be more open.  Mood/Depression: stable  Anxiety: stable  Panic attacks: n  Energy: stable   Concentration: stable  Sleeping: well  Eating: stable weight  Refills: n  Substances: n  Therapy: n  Medication compliant: y  SE:   No SI HI AVH.    ...      Previous:  Still having panic attacks once a week. Sx: soa, sweating, palpitations, tachycardia, fear, no derealization/depersonalization, last 30 min, leaves room where she was at. No triggers. Fear of another panic attack happening.     Anxiety due to 's health; admitted to hospital recently for dehydration. Notes uncontrolled worrying, muscle tension, irritability, restlessness, trouble sleeping - will wake at 3 am and can't go back to sleep. Duration is at least 6 months, since moved here.    Still having hallucinations every night before she goes to bed. Sees shadows walk past bed. No AH. Sometimes sees spiders during the day on the floor; happens once or twice a week.     Seroquel helped to some extent.     Again, patient is somewhat of a poor historian. Often times she will answer a question with \"no,\" and then contradict her self moments later.    Collateral from Meagan: patient did have a UTI (UA confirmed); was put on a course of abx. Feels her mom's confusion has improved since then.      Past Surgical History:  Past Surgical History:   Procedure Laterality Date    CATARACT EXTRACTION  2002    CHOLECYSTECTOMY OPEN  1971       Problem List:  Patient Active Problem List   Diagnosis    Forgetfulness    Anxiety    Depression    Esophageal reflux    Hallucinations    Head injury    Hypertension    Other acute sinusitis    PTSD (post-traumatic stress disorder)    Mixed hyperlipidemia    Acquired hypothyroidism    Vitamin D deficiency    " Neuropathic pain    Closed fracture of left distal radius and ulna       Allergy:   Allergies   Allergen Reactions    Floxin Otic [Ofloxacin] Rash        Discontinued Medications:  Medications Discontinued During This Encounter   Medication Reason    venlafaxine XR (EFFEXOR-XR) 150 MG 24 hr capsule Reorder    gabapentin (NEURONTIN) 300 MG capsule Reorder         Current Medications:   Current Outpatient Medications   Medication Sig Dispense Refill    gabapentin (NEURONTIN) 300 MG capsule Take 1 capsule by mouth 2 (Two) Times a Day. 60 capsule 5    QUEtiapine (SEROquel) 25 MG tablet TAKE 1 TABLET BY MOUTH AT 2PM AND 2 TABLETS NIGHTLY 90 tablet 5    venlafaxine XR (EFFEXOR-XR) 150 MG 24 hr capsule Take 1 capsule by mouth Daily. 90 capsule 1    acetaminophen (TYLENOL) 500 MG tablet Take 1 tablet by mouth Every 6 (Six) Hours As Needed for Mild Pain.      Ascorbic Acid (Vitamin C) 250 MG chewable tablet Chew.      atorvastatin (LIPITOR) 40 MG tablet TAKE 1 TABLET BY MOUTH EVERY DAY 90 tablet 1    HYDROcodone-acetaminophen (Norco) 7.5-325 MG per tablet Take 1 tablet by mouth Every 6 (Six) Hours As Needed for Moderate Pain. 18 tablet 0    levothyroxine (SYNTHROID, LEVOTHROID) 75 MCG tablet TAKE 1 TABLET BY MOUTH EVERY DAY 90 tablet 1    lisinopril (PRINIVIL,ZESTRIL) 10 MG tablet TAKE 1 TABLET BY MOUTH EVERY DAY 90 tablet 1    Melatonin 10 MG tablet Take 1 tablet by mouth Every Night.      metoprolol tartrate (LOPRESSOR) 25 MG tablet TAKE 2 TABLETS BY MOUTH DAILY 180 tablet 0    omeprazole (priLOSEC) 40 MG capsule TAKE 1 CAPSULE BY MOUTH EVERY DAY 90 capsule 1    vitamin D (ERGOCALCIFEROL) 1.25 MG (10146 UT) capsule capsule TAKE 1 CAPSULE BY MOUTH 1 TIME PER WEEK. 13 capsule 1    Zinc Sulfate (ZINC 15 PO) Take  by mouth.       No current facility-administered medications for this visit.       Past Medical History:  Past Medical History:   Diagnosis Date    Acid reflux     Allergies     Anxiety     Depression      "Forgetfulness     Hallucinations 04/15/2021    Head injury     HTN (hypertension)     PTSD (post-traumatic stress disorder)          Social History     Socioeconomic History    Marital status: Unknown   Tobacco Use    Smoking status: Never    Smokeless tobacco: Never   Vaping Use    Vaping status: Never Used   Substance and Sexual Activity    Alcohol use: Never    Drug use: Never    Sexual activity: Not Currently         Family History   Problem Relation Age of Onset    Stroke Mother     Heart disease Mother     Stroke Father     Heart disease Father     Diabetes Father     Stroke Sister     Diabetes Sister     Stroke Brother     Diabetes Brother     Diabetes Other         AUNT/UNCLE    Anxiety disorder Other        Mental Status Exam:   Hygiene:   good, at home  Cooperation:  Cooperative  Eye Contact:  Good  Psychomotor Behavior:  Appropriate  Affect:  Appropriate, mood congruent, good variability  Mood: \"I'm ok,  is confused\"  Speech:  Normal  Thought Process:  Goal directed  Thought Content:  Normal and Mood congruent  Suicidal:  None  Homicidal:  None  Hallucinations:  None  Delusion:  None  Memory:  Deficits  Orientation:  Grossly intact  Reliability:  fair  Insight:  Fair  Judgement:  Fair  Impulse Control:  Fair  Physical/Medical Issues:  No      Review of Systems:  Review of Systems   Constitutional:  Positive for fatigue. Negative for diaphoresis.   HENT:  Negative for drooling.    Eyes:  Negative for visual disturbance.   Respiratory:  Positive for cough. Negative for shortness of breath.    Cardiovascular:  Negative for chest pain, palpitations and leg swelling.   Gastrointestinal:  Negative for diarrhea, nausea and vomiting.   Endocrine: Negative for cold intolerance and heat intolerance.   Genitourinary:  Negative for difficulty urinating.   Musculoskeletal:  Negative for joint swelling.   Allergic/Immunologic: Negative for immunocompromised state.   Neurological:  Positive for light-headedness. " Negative for dizziness, seizures, syncope, speech difficulty, numbness and headaches.   Hematological:  Negative for adenopathy.   Psychiatric/Behavioral:  Positive for sleep disturbance.          Physical Exam:  Physical Exam    Vital Signs:   There were no vitals taken for this visit.     Lab Results:   No visits with results within 6 Month(s) from this visit.   Latest known visit with results is:   Office Visit on 09/28/2023   Component Date Value Ref Range Status    Glucose 09/28/2023 103 (H)  65 - 99 mg/dL Final    BUN 09/28/2023 14  8 - 23 mg/dL Final    Creatinine 09/28/2023 1.04 (H)  0.57 - 1.00 mg/dL Final    Sodium 09/28/2023 140  136 - 145 mmol/L Final    Potassium 09/28/2023 4.0  3.5 - 5.2 mmol/L Final    Chloride 09/28/2023 103  98 - 107 mmol/L Final    CO2 09/28/2023 28.0  22.0 - 29.0 mmol/L Final    Calcium 09/28/2023 9.0  8.6 - 10.5 mg/dL Final    Total Protein 09/28/2023 7.0  6.0 - 8.5 g/dL Final    Albumin 09/28/2023 3.8  3.5 - 5.2 g/dL Final    ALT (SGPT) 09/28/2023 7  1 - 33 U/L Final    AST (SGOT) 09/28/2023 12  1 - 32 U/L Final    Alkaline Phosphatase 09/28/2023 137 (H)  39 - 117 U/L Final    Total Bilirubin 09/28/2023 0.4  0.0 - 1.2 mg/dL Final    Globulin 09/28/2023 3.2  gm/dL Final    A/G Ratio 09/28/2023 1.2  g/dL Final    BUN/Creatinine Ratio 09/28/2023 13.5  7.0 - 25.0 Final    Anion Gap 09/28/2023 9.0  5.0 - 15.0 mmol/L Final    eGFR 09/28/2023 56.5 (L)  >60.0 mL/min/1.73 Final    TSH 09/28/2023 0.763  0.270 - 4.200 uIU/mL Final    Total Cholesterol 09/28/2023 133  0 - 200 mg/dL Final    Triglycerides 09/28/2023 176 (H)  0 - 150 mg/dL Final    HDL Cholesterol 09/28/2023 46  40 - 60 mg/dL Final    LDL Cholesterol  09/28/2023 58  0 - 100 mg/dL Final    VLDL Cholesterol 09/28/2023 29  5 - 40 mg/dL Final    LDL/HDL Ratio 09/28/2023 1.13   Final    Free T4 09/28/2023 1.33  0.93 - 1.70 ng/dL Final    25 Hydroxy, Vitamin D 09/28/2023 60.1  30.0 - 100.0 ng/ml Final    Vitamin B-12 09/28/2023 368   211 - 946 pg/mL Final    RPR 09/28/2023 Non-Reactive  Non-Reactive Final    Hepatitis C Ab 09/28/2023 Non-Reactive  Non-Reactive Final    WBC 09/28/2023 7.16  3.40 - 10.80 10*3/mm3 Final    RBC 09/28/2023 4.42  3.77 - 5.28 10*6/mm3 Final    Hemoglobin 09/28/2023 12.2  12.0 - 15.9 g/dL Final    Hematocrit 09/28/2023 37.7  34.0 - 46.6 % Final    MCV 09/28/2023 85.3  79.0 - 97.0 fL Final    MCH 09/28/2023 27.6  26.6 - 33.0 pg Final    MCHC 09/28/2023 32.4  31.5 - 35.7 g/dL Final    RDW 09/28/2023 13.5  12.3 - 15.4 % Final    RDW-SD 09/28/2023 42.3  37.0 - 54.0 fl Final    MPV 09/28/2023 10.4  6.0 - 12.0 fL Final    Platelets 09/28/2023 292  140 - 450 10*3/mm3 Final    Neutrophil % 09/28/2023 81.2 (H)  42.7 - 76.0 % Final    Lymphocyte % 09/28/2023 10.3 (L)  19.6 - 45.3 % Final    Monocyte % 09/28/2023 6.4  5.0 - 12.0 % Final    Eosinophil % 09/28/2023 1.1  0.3 - 6.2 % Final    Basophil % 09/28/2023 0.4  0.0 - 1.5 % Final    Immature Grans % 09/28/2023 0.6 (H)  0.0 - 0.5 % Final    Neutrophils, Absolute 09/28/2023 5.81  1.70 - 7.00 10*3/mm3 Final    Lymphocytes, Absolute 09/28/2023 0.74  0.70 - 3.10 10*3/mm3 Final    Monocytes, Absolute 09/28/2023 0.46  0.10 - 0.90 10*3/mm3 Final    Eosinophils, Absolute 09/28/2023 0.08  0.00 - 0.40 10*3/mm3 Final    Basophils, Absolute 09/28/2023 0.03  0.00 - 0.20 10*3/mm3 Final    Immature Grans, Absolute 09/28/2023 0.04  0.00 - 0.05 10*3/mm3 Final    nRBC 09/28/2023 0.0  0.0 - 0.2 /100 WBC Final       EKG Results:  No orders to display       Imaging Results:  No Images in the past 120 days found..      Assessment & Plan   Diagnoses and all orders for this visit:    1. Hallucinations (Primary)    2. Generalized anxiety disorder  -     venlafaxine XR (EFFEXOR-XR) 150 MG 24 hr capsule; Take 1 capsule by mouth Daily.  Dispense: 90 capsule; Refill: 1    3. Forgetfulness    4. Neuropathic pain  -     gabapentin (NEURONTIN) 300 MG capsule; Take 1 capsule by mouth 2 (Two) Times a Day.   Dispense: 60 capsule; Refill: 5    5. Recurrent major depressive disorder in remission    6. Neuropathic pain  Comments:  of scalp, chronic. will restart gabapentin--UDS, controlled substance agreement, and Mehdi reviewed today.  Orders:  -     gabapentin (NEURONTIN) 300 MG capsule; Take 1 capsule by mouth 2 (Two) Times a Day.  Dispense: 60 capsule; Refill: 5      Presentation most consistent with developing dementia, likely Lewy body dementia.  Patient also reports worsening gait abnormalities; she fell in the summer of last year, injuring her head, and requiring stitches.  Patient also has a history of depression, possible PTSD by review of chart.  Patient did not mention PTSD during the interview, but she is not a very good historian.  Often times she will say no to a question, and then contradict herself only a few moments later.  She has some trouble with knowing her medications or her medical conditions.    5/24/24:  now on abilify, much better in terms of aggression, but more dependent. Meagan trying to get him approved for medicaid waiver to get him into a home. Referred to [facility] of D[facility] at [phone], also [facility] @ [phone]. Close follow up to discuss pt's symptoms in depth.    Allowed patient to freely discuss and process issues, such as:  Anxiety regarding taking care of her .  ... using Rogerian psychotherapeutic techniques including unconditional positive regard, reflective listening, and demonstrating clear empathy, with the goal of ameliorating symptoms and maintaining, restoring, or improving self-esteem, adaptive skills, and ego or psychological functions (Carmelina et al. 1991).  Time (minutes) spent providing supportive psychotherapy: 16  (This time is exclusive to the therapy session and separate from the time spent on activities used to meet the criteria for the E/M service (history, exam, medical decision-making).)  Start: 10:21  Stop:  10:27  Functional status: mild impairment  Treatment plan: Medication management and supportive psychotherapy  Prognosis: good  Progress: stable  3w    2/26: Stable, well, discussed strategy to manage 's behavior. Meagan will call Dr. Rivero to inform her of his behavior, then they will call me back. Once med changes are made, plan is to put  in good spirits by giving him sugar free ice cream (vanilla) first, then offering meds.    10/26: Now sleeping. Discussed further strategies for , such as proactively getting him his meds every morning. Meagan is in agreement. Discuss sleep study next visit.    9/27: Trouble snoring, maintenance insomnia. Sleep study. Start melatonin. Fear of spiders getting on the bed, but no AVH (but history of them).    7/25: Behavioral strategies have helped patient. Patient still not getting out of the house, agrees to try at least 1x/wk. Stable, nearly at goal.     6/20: Stable, well. Discussed behavioral strategies to manage 's behavior.     5/1: Stable. Couldn't figure out Twilio. Short visit. See back in person in 6 wks to discuss strategies for mx behavioral concerns.     1/31: Seroquel dose in the afternoon helps, but the real problem is a situational stressor that cannot be solved with the medication.  We brainstormed possible ideas regarding how to manage patient's 's behavior.  We discussed having him accompany patient to her appointments to develop familiarity with me in order for me to potentially take over her medications.  They do not think that will help.      11/30: Buspar not helpful. Stop. Still anxious and depressed at 3 - 7 pm, strangely. Target with a dose of seroquel.  Can also try increasing frequency of gabapentin, low-dose trazodone.      8/31: Pt's anxiety comes from taking care of her father, per Meagan. He's dealing with depression. Meagan is stressed. Discussed getting more help to take care of pt and her father. Pt is better. No  changes to meds.     7/19: Increase effexor to target residual anx and dep.  Consider increasing BuSpar soon at next appointment.      4/19: Patient is well and right where she should be.     3/8: Target the feeling of anxiety at 4 PM by starting BuSpar at 3 PM.  Consider switching to gabapentin if BuSpar does not help.  Would start at a low dose of 100 mg.      11/8: Depressed in the mornings. Light box, increase venlafaxine.     9/7: Doing even better.  8 weeks.  No changes.    7/27: Markedly improved, and far more linear.  Still has residual anxiety and depression.  Hallucinations are still present but they do not cause distress at all to the patient.  Continue Seroquel at the present dose.  Double the dose of venlafaxine. See back in 6 weeks.  This was also discussed with Meagan, patient's daughter.    Previous:  Caution again advised in terms of increasing the dose given the likely sensitivity she has to antipsychotics.  Psychotherapy is deferred at this time.  S/p referral to neurologist is appropriate for further management.     Visit Diagnoses:    ICD-10-CM ICD-9-CM   1. Hallucinations  R44.3 780.1   2. Generalized anxiety disorder  F41.1 300.02   3. Forgetfulness  R68.89 780.99   4. Neuropathic pain  M79.2 729.2   5. Recurrent major depressive disorder in remission  F33.40 296.35   6. Neuropathic pain  M79.2 729.2       PLAN:  Risk Assessment: Risk of self-harm acutely is low. Risk factors include anxiety disorder, depressive disorder, access to weapons, recent psychosocial stressors (pandemic). Protective factors include no family history, no present SI, no history of suicide attempts or self-harm in the past, no access to weapons, no AODA, healthcare seeking, future orientation, willingness to engage in care. Risk of self-harm chronically is also low, but could be further elevated in the event of treatment noncompliance and/or AODA.  Safety: No acute safety concerns.  Medications:   CONTINUE melatonin 10  mg qhs. Risks, benefits, side effects discussed with patient including sedation, dizziness/falls risk, GI upset.  Do not use before operating vehicle, vessel, or machine. After discussion of these risks and benefits, the patient voiced understanding and agreed to proceed.   CONTINUE quetiapine 50 mg nightly, 25 mg q2pm. Risks, benefits, alternatives discussed with patient including nausea and vomiting, GI upset, sedation, akathisia, hypotension, increased appetite, lowering of seizure threshold, theoretical risk of tardive dyskinesia, extrapyramidal symptoms, restless legs syndrome. After discussion of these risks and benefits, the patient voiced understanding and agreed to proceed.  CONTINUE venlafaxine 150 mg PO QDAY. Risks, benefits, alternatives discussed with patient including GI upset, nausea vomiting diarrhea, theoretical decrease of seizure threshold predisposing the patient to a slightly higher seizure risk, headaches, sexual dysfunction, serotonin syndrome, bleeding risk, increased suicidality in patients 24 years and younger.  After discussion of these risks and benefits, the patient voiced understanding and agreed to proceed.  CONTINUE gabapentin 300 twice daily. Risks, benefits, alternatives discussed with patient including sedation, dizziness/falls risk, GI upset, weight gain.  After discussion of these risks and benefits, the patient voiced understanding and agreed to proceed. UDS ordered, Mehdi reviewed.  S/P:  BuSpar 10 mg at 3 PM daily. Ineffective.  sertraline 100 mg daily.  Not effective.  Therapy: Deferred.  Other: s/p referral to neurology for workup of likely dementia.      TREATMENT PLAN/GOALS: Continue supportive psychotherapy efforts and medications as indicated. Treatment and medication options discussed during today's visit. Patient ackowledged and verbally consented to continue with current treatment plan and was educated on the importance of compliance with treatment and follow-up  appointments.    MEDICATION ISSUES:  ERNESTINA reviewed as expected.  Discussed medication options and treatment plan of prescribed medication as well as the risks, benefits, and side effects including potential falls, possible impaired driving and metabolic adversities among others. Patient is agreeable to call the office with any worsening of symptoms or onset of side effects. Patient is agreeable to call 911 or go to the nearest ER should he/she begin having SI/HI. No medication side effects or related complaints today.     MEDS ORDERED DURING VISIT:  New Medications Ordered This Visit   Medications    gabapentin (NEURONTIN) 300 MG capsule     Sig: Take 1 capsule by mouth 2 (Two) Times a Day.     Dispense:  60 capsule     Refill:  5     Adding refills    venlafaxine XR (EFFEXOR-XR) 150 MG 24 hr capsule     Sig: Take 1 capsule by mouth Daily.     Dispense:  90 capsule     Refill:  1       Return in about 3 weeks (around 6/14/2024) for Video visit, urg if necy.         This document has been electronically signed by Riki Peterson MD  May 24, 2024 10:40 EDT      Part of this note may be an electronic transcription/translation of spoken language to printed text using the Dragon Dictation System.

## 2024-05-23 NOTE — PATIENT INSTRUCTIONS
1.  Please return to clinic at your next scheduled visit.  Contact the clinic ([phone]) at least 24 hours prior in the event you need to cancel.  2.  Do no harm to yourself or others.    3.  Avoid alcohol and drugs.    4.  Take all medications as prescribed.  Please contact the clinic with any concerns. If you are in need of medication refills, please call the clinic at [phone].    5. Should you want to get in touch with your provider, Dr. Riki Peterson, please utilize [employer] or contact the office ([phone]), and staff will be able to page Dr. Peterson directly.  6.  In the event you have personal crisis, contact the following crisis numbers: Suicide Prevention Hotline 1-460.816.9097; TATE Helpline 3-453-396-TATE; [facility] Emergency Room [phone]; text HELLO to 944098; or 487.

## 2024-05-24 ENCOUNTER — TELEMEDICINE (OUTPATIENT)
Dept: PSYCHIATRY | Facility: CLINIC | Age: 74
End: 2024-05-24
Payer: MEDICARE

## 2024-05-24 DIAGNOSIS — M79.2 NEUROPATHIC PAIN: ICD-10-CM

## 2024-05-24 DIAGNOSIS — F33.40 RECURRENT MAJOR DEPRESSIVE DISORDER IN REMISSION: ICD-10-CM

## 2024-05-24 DIAGNOSIS — R44.3 HALLUCINATIONS: Primary | ICD-10-CM

## 2024-05-24 DIAGNOSIS — F41.1 GENERALIZED ANXIETY DISORDER: ICD-10-CM

## 2024-05-24 DIAGNOSIS — R68.89 FORGETFULNESS: ICD-10-CM

## 2024-05-24 RX ORDER — VENLAFAXINE HYDROCHLORIDE 150 MG/1
150 CAPSULE, EXTENDED RELEASE ORAL DAILY
Qty: 90 CAPSULE | Refills: 1 | Status: SHIPPED | OUTPATIENT
Start: 2024-05-24

## 2024-05-24 RX ORDER — GABAPENTIN 300 MG/1
300 CAPSULE ORAL 2 TIMES DAILY
Qty: 60 CAPSULE | Refills: 5 | Status: SHIPPED | OUTPATIENT
Start: 2024-05-24

## 2024-06-10 NOTE — TELEPHONE ENCOUNTER
Patient was asked to have 6-month follow-up appointment in March.  I have sent in medication for 30 days.  Please call to get her on the schedule in the next 30 days.

## 2024-06-14 ENCOUNTER — TELEMEDICINE (OUTPATIENT)
Dept: PSYCHIATRY | Facility: CLINIC | Age: 74
End: 2024-06-14
Payer: MEDICARE

## 2024-06-14 DIAGNOSIS — R44.3 HALLUCINATIONS: Primary | ICD-10-CM

## 2024-06-14 DIAGNOSIS — F41.1 GENERALIZED ANXIETY DISORDER: ICD-10-CM

## 2024-06-14 DIAGNOSIS — F33.40 RECURRENT MAJOR DEPRESSIVE DISORDER IN REMISSION: ICD-10-CM

## 2024-06-14 DIAGNOSIS — M79.2 NEUROPATHIC PAIN: ICD-10-CM

## 2024-06-14 DIAGNOSIS — F51.05 INSOMNIA DUE TO MENTAL CONDITION: ICD-10-CM

## 2024-06-14 DIAGNOSIS — R68.89 FORGETFULNESS: ICD-10-CM

## 2024-06-14 NOTE — PATIENT INSTRUCTIONS
1.  Please return to clinic at your next scheduled visit.  Contact the clinic ([phone]) at least 24 hours prior in the event you need to cancel.  2.  Do no harm to yourself or others.    3.  Avoid alcohol and drugs.    4.  Take all medications as prescribed.  Please contact the clinic with any concerns. If you are in need of medication refills, please call the clinic at [phone].    5. Should you want to get in touch with your provider, Dr. Riki Peterson, please utilize [employer] or contact the office ([phone]), and staff will be able to page Dr. Peterson directly.  6.  In the event you have personal crisis, contact the following crisis numbers: Suicide Prevention Hotline 1-387.542.4340; TATE Helpline 8-845-487-TATE; [facility] Emergency Room [phone]; text HELLO to 059800; or 457.

## 2024-06-14 NOTE — PROGRESS NOTES
"Subjective   Payal Singer is a 74 y.o. female who presents today for follow up    Chief Complaint:  mdd meagan    History of Present Illness:     Initial Chart review 4/15/21: Seen 3/30 to establish care. Hx of anx/dep avh in [place]. on both sertraline and venlafaxine, was on Seroquel. Hx of HTN, GERD. On gabapentin 600 tid, sertraline 200 d, venla 150 d, quetiap 25 qhs. 2021 labs: cbc shows low mch, high rdw; Cr 1.15 high, abnormal lipids, TSH and fT4 wnl. No OP head imaging, ekg.     Discussed behavioral strategies to manage 's behavior.  Calming:  Children or friends calling on the phone  Meals available (food)  snacks    \"Payal\" and \"Meagan\" and \"Ck\" her     Chart review:   24: no visits.  24: no visits.  : UC frx of left ulna, ortho x3,   Int med, reassuring rpr/b12/vit D/hcv/hyroid studies/cbc. Cmp: elev gluc 103, cr 1.04, ap 137.  No visits.  no new    Plannin24:  now on abilify, much better in terms of aggression, but more dependent. Meagan trying to get him approved for medicaid waiver to get him into a home. Referred to [facility] at [phone], also [facility] @ [phone]. Close follow up to discuss pt's symptoms in depth.  : Stable, well, discussed strategy to manage 's behavior. Meagan will call Dr. Rivero to inform her of his behavior, then they will call me back. Once med changes are made, plan is to put  in good spirits by giving him sugar free ice cream (vanilla) first, then offering meds.  10/26: Now sleeping. Discussed further strategies for , such as proactively getting him his meds every morning. Meagan is in agreement. Discuss sleep study next visit.    Visits (Below):    2024: Virtual visit via Zoom audio and video due to the COVID-19 pandemic.  Patient is accepting of and agreeable to visit.  The visit consisted of the patient and I. Patient is at home, and I am at the " "office.  Interview:  \"I've been having hallucinations.\"  Sees them right before dark: people coming up to me, standing next to me. No AH, only VH. Has seen spiders at night (woke her up).  Pt has insight into the fact that they are not hallucinations  At one point, it appeared we had the hallucinations under control, but pt denies this now  Has not been evaluated for dementia  Mood/Depression: stable MDD, mood  Anxiety: ESHA some feeling on edge  Panic attacks: n  Energy: baseline low  Concentration: baseline low  Insomnia: stable  Eatin lbs  Refills: y  Substances: def  Therapy: n  Medication compliant: y  SE: n  No SI HI AVH.      2024: Virtual visit via Zoom audio and video due to the COVID-19 pandemic.  Patient is accepting of and agreeable to visit.  The visit consisted of the patient and I. Patient is at home, and I am at the office.  Interview:  Plannin/26: Stable, well, discussed strategy to manage 's behavior. Meagan will call Dr. Rivero to inform her of his behavior, then they will call me back. Once med changes are made, plan is to put  in good spirits by giving him sugar free ice cream (vanilla) first, then offering meds.  10/26: Now sleeping. Discussed further strategies for , such as proactively getting him his meds every morning. Meagan is in agreement. Discuss sleep study next visit.  \"He keeps asking the same question again and again.\"  Aggression hasn't been bad at alI in weeks.  Meagan: he's now very dependent.  Mood/Depression: stable MDD, mood  Anxiety: stable ESHA  Panic attacks: n  Energy: baseline low  Concentration: baseline low  Insomnia: stable  Eatin lbs  Refills: y  Substances: def  Therapy: n  Medication compliant: y  SE: n  No SI HI AVH.      : Virtual visit via Zoom audio and video due to the COVID-19 pandemic.  Patient is accepting of and agreeable to visit.  The visit consisted of the patient and I. Patient is at home, and I am at the " "office:  Chart review:   :  frx of left ulna, ortho x3,   Int med, reassuring rpr/b12/vit D/hcv/hyroid studies/cbc. Cmp: elev gluc 103, cr 1.04, ap 137.  No visits.  no new  Planning:   10/26: Now sleeping. Discussed further strategies for , such as proactively getting him his meds every morning. Meagan is in agreement. Discuss sleep study next visit.  : Trouble snoring, maintenance insomnia. Sleep study. Start melatonin. Fear of spiders getting on the bed, but no AVH (but history of them).  : Behavioral strategies have helped patient. Patient still not getting out of the house, agrees to try at least 1x/wk. Stable, nearly at goal.   Stable, well. Discussed behavioral strategies to manage 's behavior.  Calming:  Children or friends calling on the phone  Meals available (food)  snacks  \"My  broke his back, fell in the bathroom.\"  I also broke my wrist. I was carrying some garbage and I guess it was too heavy and I slipped and fell. No surgery needed.  :   Now with broken back, one challenge is physically helping him around  Has a bad temper, even worse now that he's dependent.  Meagan: I've been giving his meds, but I don't think they are helping much  Using food/snacks to motivate him  Apple pie  Vanilla ice cream, blue bell  Mood/Depression: stable MDD, mood  Anxiety: stable ESHA  Panic attacks: n  Energy: baseline low  Concentration: baseline low  Sleeping: stable insomnia  Eatin lbs  Refills: y  Substances: def  Therapy: n  Medication compliant: y  SE: n  No SI HI AVH.      10/26: In person interview:  Chart review:   Int med, reassuring rpr/b12/vit D/hcv/hyroid studies/cbc. Cmp: elev gluc 103, cr 1.04, ap 137.  No visits.  no new  Plannin/27: Trouble snoring, maintenance insomnia. Sleep study. Start melatonin. Fear of spiders getting on the bed, but no AVH (but history of them).  : Behavioral strategies have helped patient. Patient still not getting out of " "the house, agrees to try at least 1x/wk. Stable, nearly at goal.   Stable, well. Discussed behavioral strategies to manage 's behavior.  Calming:  Children or friends calling on the phone  Meals available (food)  snacks  \"I'm ok.\"  :   Behavior is related to compliance. We discussed strategies for compliance, such as helping give him his am meds.  Snacks, meals, calls from children and friends helping keep him calm  I'm doing pretty good. My mood depends on his mood.  Mood/Depression: good mood  Anxiety: stable worrying  Panic attacks: n  Energy: baseline low  Concentration: baseline low  Sleeping: sleeping pretty good.  Eating: stable  Refills: y  Substances: def  Therapy: n  Medication compliant: y  SE: n  No SI HI AVH.      : In person interview:  Chart review:   No visits.  no new  Plannin/25: Behavioral strategies have helped patient. Patient still not getting out of the house, agrees to try at least 1x/wk. Stable, nearly at goal.   Stable, well. Discussed behavioral strategies to manage 's behavior.  Calming:  Children or friends calling on the phone  Meals available (food)  snacks  \"I'm good.\"  : \"he's fair. He can't do anything anymore.\" Wheelchair bound for the last 2-3 months. Due to falls.  Meagan: \"they helped a little.\" The food helped. The meds help.  Mood/Depression: good mood  Anxiety: stable worrying  Panic attacks: n  Energy: baseline low  Concentration: baseline low  Sleeping: maintenance insomnia x1 year (told me today)  Eating: stable  Refills: y  Substances: def  Therapy: n  Medication compliant: y  SE: n  No SI HI AVH.      : In person interview:  Chart review: no new  Planning: Stable, well. Discussed behavioral strategies to manage 's behavior.  Calming:  Children or friends calling on the phone  Meals available (food)  snacks  \"We did a different menu.\"  \"He just seems like he's feeling better.\"  Meagan: \"they helped a little.\" The food " "helped. The meds help.  Mood/Depression: good mood  Anxiety: less anxious, on ege  Panic attacks: n  Energy: baseline low  Concentration: baseline low  Sleeping: well  Eating: stable  Refills: y  Substances: def  Therapy: n  Medication compliant: y  SE: n  No SI HI AVH.        6/20: In person.  Interview:  Chart review: no new.  Planning: Stable. Couldn't figure out Twilio. Short visit. See back in person in 6 wks to discuss strategies for mx behavioral concerns.   \"Yeah, it's my .\"  He yells at me.  Triggers:   Moving helped. More space.   may have PTSD.  He may be medication noncompliant.  Calming:  Children or friends calling on the phone  Meals available (food)  snacks  Mood/Depression: I'm doing ok  Anxiety: good, stable  Panic attacks: n  Energy: baseline down  Concentration: baseline down  Sleeping: fairly well  Eating: stable  Refills: y  Substances: n  Therapy: n  Medication compliant: y  SE: n  No SI HI AVH.        5/1: Virtual visit via Zoom audio and video due to the COVID-19 pandemic.  Patient is accepting of and agreeable to visit.  The visit consisted of the patient and I. The patient is at home, and I am at the office.  Interview:  Chart review: No new.  Planning: Seroquel dose in the afternoon helps, but the real problem is a situational stressor that cannot be solved with the medication.  We brainstormed possible ideas regarding how to manage patient's 's behavior.  We discussed having him accompany patient to her appointments to develop familiarity with me in order for me to potentially take over his medications.  They do not think that will help.   \"I've been pretty good.\"  We moved to another house this Saturday.  More room, but 's behavior hasn't changed. It has a basement.  Meagan: we should set up an appnt in person.  They are both in the walkout basement. They can't take stairs. They are around each constantly.  In person: would be more open.  Mood/Depression: " "stable  Anxiety: stable  Panic attacks: n  Energy: stable   Concentration: stable  Sleeping: well  Eating: stable weight  Refills: n  Substances: n  Therapy: n  Medication compliant: y  SE:   No SI HI AVH.    ...      Previous:  Still having panic attacks once a week. Sx: soa, sweating, palpitations, tachycardia, fear, no derealization/depersonalization, last 30 min, leaves room where she was at. No triggers. Fear of another panic attack happening.     Anxiety due to 's health; admitted to hospital recently for dehydration. Notes uncontrolled worrying, muscle tension, irritability, restlessness, trouble sleeping - will wake at 3 am and can't go back to sleep. Duration is at least 6 months, since moved here.    Still having hallucinations every night before she goes to bed. Sees shadows walk past bed. No AH. Sometimes sees spiders during the day on the floor; happens once or twice a week.     Seroquel helped to some extent.     Again, patient is somewhat of a poor historian. Often times she will answer a question with \"no,\" and then contradict her self moments later.    Collateral from Meagan: patient did have a UTI (UA confirmed); was put on a course of abx. Feels her mom's confusion has improved since then.      Past Surgical History:  Past Surgical History:   Procedure Laterality Date    CATARACT EXTRACTION  2002    CHOLECYSTECTOMY OPEN  1971       Problem List:  Patient Active Problem List   Diagnosis    Forgetfulness    Anxiety    Depression    Esophageal reflux    Hallucinations    Head injury    Hypertension    Other acute sinusitis    PTSD (post-traumatic stress disorder)    Mixed hyperlipidemia    Acquired hypothyroidism    Vitamin D deficiency    Neuropathic pain    Closed fracture of left distal radius and ulna       Allergy:   Allergies   Allergen Reactions    Floxin Otic [Ofloxacin] Rash        Discontinued Medications:  There are no discontinued medications.        Current Medications:   Current " Outpatient Medications   Medication Sig Dispense Refill    QUEtiapine (SEROquel) 25 MG tablet TAKE 1 TABLET BY MOUTH AT 2PM AND 2 TABLETS NIGHTLY 90 tablet 5    acetaminophen (TYLENOL) 500 MG tablet Take 1 tablet by mouth Every 6 (Six) Hours As Needed for Mild Pain.      Ascorbic Acid (Vitamin C) 250 MG chewable tablet Chew.      atorvastatin (LIPITOR) 40 MG tablet TAKE 1 TABLET BY MOUTH EVERY DAY 90 tablet 1    gabapentin (NEURONTIN) 300 MG capsule Take 1 capsule by mouth 2 (Two) Times a Day. 60 capsule 5    HYDROcodone-acetaminophen (Norco) 7.5-325 MG per tablet Take 1 tablet by mouth Every 6 (Six) Hours As Needed for Moderate Pain. 18 tablet 0    levothyroxine (SYNTHROID, LEVOTHROID) 75 MCG tablet TAKE 1 TABLET BY MOUTH EVERY DAY 90 tablet 1    lisinopril (PRINIVIL,ZESTRIL) 10 MG tablet TAKE 1 TABLET BY MOUTH EVERY DAY 90 tablet 1    Melatonin 10 MG tablet Take 1 tablet by mouth Every Night.      metoprolol tartrate (LOPRESSOR) 25 MG tablet TAKE 2 TABLETS BY MOUTH DAILY 60 tablet 0    omeprazole (priLOSEC) 40 MG capsule TAKE 1 CAPSULE BY MOUTH EVERY DAY 90 capsule 1    venlafaxine XR (EFFEXOR-XR) 150 MG 24 hr capsule Take 1 capsule by mouth Daily. 90 capsule 1    vitamin D (ERGOCALCIFEROL) 1.25 MG (34245 UT) capsule capsule TAKE 1 CAPSULE BY MOUTH 1 TIME PER WEEK. 13 capsule 1    Zinc Sulfate (ZINC 15 PO) Take  by mouth.       No current facility-administered medications for this visit.       Past Medical History:  Past Medical History:   Diagnosis Date    Acid reflux     Allergies     Anxiety     Depression     Forgetfulness     Hallucinations 04/15/2021    Head injury     HTN (hypertension)     PTSD (post-traumatic stress disorder)          Social History     Socioeconomic History    Marital status: Unknown   Tobacco Use    Smoking status: Never    Smokeless tobacco: Never   Vaping Use    Vaping status: Never Used   Substance and Sexual Activity    Alcohol use: Never    Drug use: Never    Sexual activity: Not  "Currently         Family History   Problem Relation Age of Onset    Stroke Mother     Heart disease Mother     Stroke Father     Heart disease Father     Diabetes Father     Stroke Sister     Diabetes Sister     Stroke Brother     Diabetes Brother     Diabetes Other         AUNT/UNCLE    Anxiety disorder Other        Mental Status Exam:   Hygiene:   good, at home  Cooperation:  Cooperative  Eye Contact:  Good  Psychomotor Behavior:  Appropriate  Affect:  mild anxiety, mood congruent, good variability  Mood: \"I'm having hallucinations\"  Speech:  Normal  Thought Process:  Goal directed  Thought Content:  Normal and Mood congruent  Suicidal:  None  Homicidal:  None  Hallucinations:  None  Delusion:  None  Memory:  Deficits  Orientation:  Grossly intact  Reliability:  fair  Insight:  Fair  Judgement:  Fair  Impulse Control:  Fair  Physical/Medical Issues:  No      Review of Systems:  Review of Systems   Constitutional:  Positive for fatigue. Negative for diaphoresis.   HENT:  Negative for drooling.    Eyes:  Negative for visual disturbance.   Respiratory:  Positive for cough. Negative for shortness of breath.    Cardiovascular:  Negative for chest pain, palpitations and leg swelling.   Gastrointestinal:  Negative for diarrhea, nausea and vomiting.   Endocrine: Negative for cold intolerance and heat intolerance.   Genitourinary:  Negative for difficulty urinating.   Musculoskeletal:  Negative for joint swelling.   Allergic/Immunologic: Negative for immunocompromised state.   Neurological:  Positive for light-headedness. Negative for dizziness, seizures, syncope, speech difficulty, numbness and headaches.   Hematological:  Negative for adenopathy.   Psychiatric/Behavioral:  Positive for sleep disturbance.          Physical Exam:  Physical Exam    Vital Signs:   There were no vitals taken for this visit.     Lab Results:   No visits with results within 6 Month(s) from this visit.   Latest known visit with results is: "   Office Visit on 09/28/2023   Component Date Value Ref Range Status    Glucose 09/28/2023 103 (H)  65 - 99 mg/dL Final    BUN 09/28/2023 14  8 - 23 mg/dL Final    Creatinine 09/28/2023 1.04 (H)  0.57 - 1.00 mg/dL Final    Sodium 09/28/2023 140  136 - 145 mmol/L Final    Potassium 09/28/2023 4.0  3.5 - 5.2 mmol/L Final    Chloride 09/28/2023 103  98 - 107 mmol/L Final    CO2 09/28/2023 28.0  22.0 - 29.0 mmol/L Final    Calcium 09/28/2023 9.0  8.6 - 10.5 mg/dL Final    Total Protein 09/28/2023 7.0  6.0 - 8.5 g/dL Final    Albumin 09/28/2023 3.8  3.5 - 5.2 g/dL Final    ALT (SGPT) 09/28/2023 7  1 - 33 U/L Final    AST (SGOT) 09/28/2023 12  1 - 32 U/L Final    Alkaline Phosphatase 09/28/2023 137 (H)  39 - 117 U/L Final    Total Bilirubin 09/28/2023 0.4  0.0 - 1.2 mg/dL Final    Globulin 09/28/2023 3.2  gm/dL Final    A/G Ratio 09/28/2023 1.2  g/dL Final    BUN/Creatinine Ratio 09/28/2023 13.5  7.0 - 25.0 Final    Anion Gap 09/28/2023 9.0  5.0 - 15.0 mmol/L Final    eGFR 09/28/2023 56.5 (L)  >60.0 mL/min/1.73 Final    TSH 09/28/2023 0.763  0.270 - 4.200 uIU/mL Final    Total Cholesterol 09/28/2023 133  0 - 200 mg/dL Final    Triglycerides 09/28/2023 176 (H)  0 - 150 mg/dL Final    HDL Cholesterol 09/28/2023 46  40 - 60 mg/dL Final    LDL Cholesterol  09/28/2023 58  0 - 100 mg/dL Final    VLDL Cholesterol 09/28/2023 29  5 - 40 mg/dL Final    LDL/HDL Ratio 09/28/2023 1.13   Final    Free T4 09/28/2023 1.33  0.93 - 1.70 ng/dL Final    25 Hydroxy, Vitamin D 09/28/2023 60.1  30.0 - 100.0 ng/ml Final    Vitamin B-12 09/28/2023 368  211 - 946 pg/mL Final    RPR 09/28/2023 Non-Reactive  Non-Reactive Final    Hepatitis C Ab 09/28/2023 Non-Reactive  Non-Reactive Final    WBC 09/28/2023 7.16  3.40 - 10.80 10*3/mm3 Final    RBC 09/28/2023 4.42  3.77 - 5.28 10*6/mm3 Final    Hemoglobin 09/28/2023 12.2  12.0 - 15.9 g/dL Final    Hematocrit 09/28/2023 37.7  34.0 - 46.6 % Final    MCV 09/28/2023 85.3  79.0 - 97.0 fL Final    MCH  09/28/2023 27.6  26.6 - 33.0 pg Final    MCHC 09/28/2023 32.4  31.5 - 35.7 g/dL Final    RDW 09/28/2023 13.5  12.3 - 15.4 % Final    RDW-SD 09/28/2023 42.3  37.0 - 54.0 fl Final    MPV 09/28/2023 10.4  6.0 - 12.0 fL Final    Platelets 09/28/2023 292  140 - 450 10*3/mm3 Final    Neutrophil % 09/28/2023 81.2 (H)  42.7 - 76.0 % Final    Lymphocyte % 09/28/2023 10.3 (L)  19.6 - 45.3 % Final    Monocyte % 09/28/2023 6.4  5.0 - 12.0 % Final    Eosinophil % 09/28/2023 1.1  0.3 - 6.2 % Final    Basophil % 09/28/2023 0.4  0.0 - 1.5 % Final    Immature Grans % 09/28/2023 0.6 (H)  0.0 - 0.5 % Final    Neutrophils, Absolute 09/28/2023 5.81  1.70 - 7.00 10*3/mm3 Final    Lymphocytes, Absolute 09/28/2023 0.74  0.70 - 3.10 10*3/mm3 Final    Monocytes, Absolute 09/28/2023 0.46  0.10 - 0.90 10*3/mm3 Final    Eosinophils, Absolute 09/28/2023 0.08  0.00 - 0.40 10*3/mm3 Final    Basophils, Absolute 09/28/2023 0.03  0.00 - 0.20 10*3/mm3 Final    Immature Grans, Absolute 09/28/2023 0.04  0.00 - 0.05 10*3/mm3 Final    nRBC 09/28/2023 0.0  0.0 - 0.2 /100 WBC Final       EKG Results:  No orders to display       Imaging Results:  No Images in the past 120 days found..      Assessment & Plan   Diagnoses and all orders for this visit:    1. Hallucinations (Primary)    2. Generalized anxiety disorder    3. Forgetfulness    4. Recurrent major depressive disorder in remission    5. Neuropathic pain      Presentation most consistent with developing dementia, likely Lewy body dementia.  Patient also reports worsening gait abnormalities; she fell in the summer of last year, injuring her head, and requiring stitches.  Patient also has a history of depression, possible PTSD by review of chart.  Patient did not mention PTSD during the interview, but she is not a very good historian.  Often times she will say no to a question, and then contradict herself only a few moments later.  She has some trouble with knowing her medications or her medical  conditions.    06/14/2024: EKG now, if qtc reassuring, increase qhs quetiapine to 100 mg. Utilize distraction and possibly abilify to manage Ck's sundowning.     Allowed patient to freely discuss and process issues, such as:  Anxiety and depression related to marriage.  ... using Rogerian psychotherapeutic techniques including unconditional positive regard, reflective listening, and demonstrating clear empathy, with the goal of ameliorating symptoms and maintaining, restoring, or improving self-esteem, adaptive skills, and ego or psychological functions (Carmelina et al. 1991).  Time (minutes) spent providing supportive psychotherapy: 18  (This time is exclusive to the therapy session and separate from the time spent on activities used to meet the criteria for the E/M service (history, exam, medical decision-making).)  Start: 6:00  Stop: 6:18  Functional status: mild impairment  Treatment plan: Medication management and supportive psychotherapy  Prognosis: good  Progress: VH  4w    5/24/24:  now on abilify, much better in terms of aggression, but more dependent. Meagan trying to get him approved for medicaid waiver to get him into a home. Referred to [facility] at [phone], also [facility] @ [phone]. Close follow up to discuss pt's symptoms in depth.    2/26: Stable, well, discussed strategy to manage 's behavior. Meagan will call Dr. Rivero to inform her of his behavior, then they will call me back. Once med changes are made, plan is to put  in good spirits by giving him sugar free ice cream (vanilla) first, then offering meds.    10/26: Now sleeping. Discussed further strategies for , such as proactively getting him his meds every morning. Meagan is in agreement. Discuss sleep study next visit.    9/27: Trouble snoring, maintenance insomnia. Sleep study. Start melatonin. Fear of spiders getting on the bed, but no AVH (but history of  them).    7/25: Behavioral strategies have helped patient. Patient still not getting out of the house, agrees to try at least 1x/wk. Stable, nearly at goal.     6/20: Stable, well. Discussed behavioral strategies to manage 's behavior.     5/1: Stable. Couldn't figure out Twilio. Short visit. See back in person in 6 wks to discuss strategies for mx behavioral concerns.     1/31: Seroquel dose in the afternoon helps, but the real problem is a situational stressor that cannot be solved with the medication.  We brainstormed possible ideas regarding how to manage patient's 's behavior.  We discussed having him accompany patient to her appointments to develop familiarity with me in order for me to potentially take over her medications.  They do not think that will help.      11/30: Buspar not helpful. Stop. Still anxious and depressed at 3 - 7 pm, strangely. Target with a dose of seroquel.  Can also try increasing frequency of gabapentin, low-dose trazodone.      8/31: Pt's anxiety comes from taking care of her father, per Meagan. He's dealing with depression. Meagan is stressed. Discussed getting more help to take care of pt and her father. Pt is better. No changes to meds.     7/19: Increase effexor to target residual anx and dep.  Consider increasing BuSpar soon at next appointment.      4/19: Patient is well and right where she should be.     3/8: Target the feeling of anxiety at 4 PM by starting BuSpar at 3 PM.  Consider switching to gabapentin if BuSpar does not help.  Would start at a low dose of 100 mg.      11/8: Depressed in the mornings. Light box, increase venlafaxine.     9/7: Doing even better.  8 weeks.  No changes.    7/27: Markedly improved, and far more linear.  Still has residual anxiety and depression.  Hallucinations are still present but they do not cause distress at all to the patient.  Continue Seroquel at the present dose.  Double the dose of venlafaxine. See back in 6 weeks.  This  was also discussed with Meagan, patient's daughter.    Previous:  Caution again advised in terms of increasing the dose given the likely sensitivity she has to antipsychotics.  Psychotherapy is deferred at this time.  S/p referral to neurologist is appropriate for further management.     Visit Diagnoses:    ICD-10-CM ICD-9-CM   1. Hallucinations  R44.3 780.1   2. Generalized anxiety disorder  F41.1 300.02   3. Forgetfulness  R68.89 780.99   4. Recurrent major depressive disorder in remission  F33.40 296.35   5. Neuropathic pain  M79.2 729.2       PLAN:  Risk Assessment: Risk of self-harm acutely is low. Risk factors include anxiety disorder, depressive disorder, access to weapons, recent psychosocial stressors (pandemic). Protective factors include no family history, no present SI, no history of suicide attempts or self-harm in the past, no access to weapons, no AODA, healthcare seeking, future orientation, willingness to engage in care. Risk of self-harm chronically is also low, but could be further elevated in the event of treatment noncompliance and/or AODA.  Safety: No acute safety concerns.  Medications:   CONTINUE melatonin 10 mg qhs. Risks, benefits, side effects discussed with patient including sedation, dizziness/falls risk, GI upset.  Do not use before operating vehicle, vessel, or machine. After discussion of these risks and benefits, the patient voiced understanding and agreed to proceed.   CONTINUE quetiapine 50 mg nightly, 25 mg q2pm. Risks, benefits, alternatives discussed with patient including nausea and vomiting, GI upset, sedation, akathisia, hypotension, increased appetite, lowering of seizure threshold, theoretical risk of tardive dyskinesia, extrapyramidal symptoms, restless legs syndrome. After discussion of these risks and benefits, the patient voiced understanding and agreed to proceed.  CONTINUE venlafaxine 150 mg PO QDAY. Risks, benefits, alternatives discussed with patient including GI  upset, nausea vomiting diarrhea, theoretical decrease of seizure threshold predisposing the patient to a slightly higher seizure risk, headaches, sexual dysfunction, serotonin syndrome, bleeding risk, increased suicidality in patients 24 years and younger.  After discussion of these risks and benefits, the patient voiced understanding and agreed to proceed.  CONTINUE gabapentin 300 twice daily. Risks, benefits, alternatives discussed with patient including sedation, dizziness/falls risk, GI upset, weight gain.  After discussion of these risks and benefits, the patient voiced understanding and agreed to proceed. UDS ordered, Ernestina reviewed.  S/P:  BuSpar 10 mg at 3 PM daily. Ineffective.  sertraline 100 mg daily.  Not effective.  Therapy: Deferred.  Other: s/p referral to neurology for workup of likely dementia.      TREATMENT PLAN/GOALS: Continue supportive psychotherapy efforts and medications as indicated. Treatment and medication options discussed during today's visit. Patient ackowledged and verbally consented to continue with current treatment plan and was educated on the importance of compliance with treatment and follow-up appointments.    MEDICATION ISSUES:  ERNESTINA reviewed as expected.  Discussed medication options and treatment plan of prescribed medication as well as the risks, benefits, and side effects including potential falls, possible impaired driving and metabolic adversities among others. Patient is agreeable to call the office with any worsening of symptoms or onset of side effects. Patient is agreeable to call 911 or go to the nearest ER should he/she begin having SI/HI. No medication side effects or related complaints today.     MEDS ORDERED DURING VISIT:  No orders of the defined types were placed in this encounter.      No follow-ups on file.         This document has been electronically signed by Riki Peterson MD  June 14, 2024 10:54 EDT      Part of this note may be an electronic  transcription/translation of spoken language to printed text using the Dragon Dictation System.

## 2024-06-28 ENCOUNTER — HOSPITAL ENCOUNTER (OUTPATIENT)
Dept: CARDIOLOGY | Facility: HOSPITAL | Age: 74
Discharge: HOME | End: 2024-06-28
Payer: MEDICARE

## 2024-06-28 DIAGNOSIS — F51.05 INSOMNIA DUE TO MENTAL CONDITION: ICD-10-CM

## 2024-06-28 DIAGNOSIS — F41.1 GENERALIZED ANXIETY DISORDER: ICD-10-CM

## 2024-06-28 DIAGNOSIS — R44.3 HALLUCINATIONS: ICD-10-CM

## 2024-06-28 DIAGNOSIS — F33.40 RECURRENT MAJOR DEPRESSIVE DISORDER IN REMISSION: ICD-10-CM

## 2024-06-28 LAB
QT INTERVAL: 442 MS
QTC INTERVAL: 489 MS

## 2024-06-28 PROCEDURE — 93005 ELECTROCARDIOGRAM TRACING: CPT | Performed by: STUDENT IN AN ORGANIZED HEALTH CARE EDUCATION/TRAINING PROGRAM

## 2024-07-13 LAB
QT INTERVAL: 442 MS
QTC INTERVAL: 489 MS

## 2024-07-17 RX ORDER — LISINOPRIL 10 MG/1
10 TABLET ORAL DAILY
Qty: 90 TABLET | Refills: 1 | Status: SHIPPED | OUTPATIENT
Start: 2024-07-17

## 2024-07-18 ENCOUNTER — TELEMEDICINE (OUTPATIENT)
Dept: PSYCHIATRY | Facility: CLINIC | Age: 74
End: 2024-07-18
Payer: MEDICARE

## 2024-07-18 DIAGNOSIS — F51.05 INSOMNIA DUE TO MENTAL CONDITION: ICD-10-CM

## 2024-07-18 DIAGNOSIS — R44.3 HALLUCINATIONS: Primary | ICD-10-CM

## 2024-07-18 DIAGNOSIS — M79.2 NEUROPATHIC PAIN: ICD-10-CM

## 2024-07-18 DIAGNOSIS — F33.40 RECURRENT MAJOR DEPRESSIVE DISORDER IN REMISSION: ICD-10-CM

## 2024-07-18 DIAGNOSIS — F41.1 GENERALIZED ANXIETY DISORDER: ICD-10-CM

## 2024-07-18 DIAGNOSIS — R68.89 FORGETFULNESS: ICD-10-CM

## 2024-07-18 NOTE — PROGRESS NOTES
"Subjective   Payal Singer is a 74 y.o. female who presents today for follow up    Chief Complaint:  mdd meagan    History of Present Illness:     Initial Chart review 4/15/21: Seen 3/30 to establish care. Hx of anx/dep avh in [place]. on both sertraline and venlafaxine, was on Seroquel. Hx of HTN, GERD. On gabapentin 600 tid, sertraline 200 d, venla 150 d, quetiap 25 qhs. 2021 labs: cbc shows low mch, high rdw; Cr 1.15 high, abnormal lipids, TSH and fT4 wnl. No OP head imaging, ekg.     Discussed behavioral strategies to manage 's behavior.  Calming:  Children or friends calling on the phone  Meals available (food)  snacks    \"Payal\" and \"Meagan\" and \"Ck\" her     Chart review:   2024: EKG qtc 489.  24: no visits.  24: no visits.  : UC frx of left ulna, ortho x3,   Int med, reassuring rpr/b12/vit D/hcv/hyroid studies/cbc. Cmp: elev gluc 103, cr 1.04, ap 137.  No visits.  no new    Plannin2024: EKG now, if qtc reassuring, increase qhs quetiapine to 100 mg. Utilize distraction and possibly abilify to manage Ck's sundowning.   24:  now on abilify, much better in terms of aggression, but more dependent. Meagan trying to get him approved for medicaid waiver to get him into a home. Referred to Office of Dementia Services at [phone], also [facility] @ [phone]. Close follow up to discuss pt's symptoms in depth.  : Stable, well, discussed strategy to manage 's behavior. Meagan will call Dr. Rivero to inform her of his behavior, then they will call me back. Once med changes are made, plan is to put  in good spirits by giving him sugar free ice cream (vanilla) first, then offering meds.  10/26: Now sleeping. Discussed further strategies for , such as proactively getting him his meds every morning. Meagan is in agreement. Discuss sleep study next visit.    Visits (Below):    2024: Virtual visit via Zoom audio and " "video due to the COVID-19 pandemic.  Patient is accepting of and agreeable to visit.  The visit consisted of the patient and I. Patient is at home, and I am at the office.  Interview:  \" We just got the EKG.\"  No change to hallucinations.  Prolonged qtc  Has not been evaluated for dementia.  Mood/Depression: stable MDD  Anxiety: ESHA on edge  Panic attacks: n  Energy: baseline low  Concentration: baseline low  Insomnia: stable  Eatin lbs  Refills: y  Substances: def  Therapy: n  Medication compliant: y  SE: n  No SI HI AVH, but has seen them recently        2024: Virtual visit via Zoom audio and video due to the COVID-19 pandemic.  Patient is accepting of and agreeable to visit.  The visit consisted of the patient and I. Patient is at home, and I am at the office.  Interview:  \"I've been having hallucinations.\"  Sees them right before dark: people coming up to me, standing next to me. No AH, only VH. Has seen spiders at night (woke her up).  Pt has insight into the fact that they are not hallucinations  At one point, it appeared we had the hallucinations under control, but pt denies this now  Has not been evaluated for dementia  Mood/Depression: stable MDD, mood  Anxiety: ESHA some feeling on edge  Panic attacks: n  Energy: baseline low  Concentration: baseline low  Insomnia: stable  Eatin lbs  Refills: y  Substances: def  Therapy: n  Medication compliant: y  SE: n  No SI HI AVH.      2024: Virtual visit via Zoom audio and video due to the COVID-19 pandemic.  Patient is accepting of and agreeable to visit.  The visit consisted of the patient and I. Patient is at home, and I am at the office.  Interview:  Plannin/26: Stable, well, discussed strategy to manage 's behavior. Meagan will call Dr. Rivero to inform her of his behavior, then they will call me back. Once med changes are made, plan is to put  in good spirits by giving him sugar free ice cream (vanilla) first, then " "offering meds.  10/26: Now sleeping. Discussed further strategies for , such as proactively getting him his meds every morning. Meagan is in agreement. Discuss sleep study next visit.  \"He keeps asking the same question again and again.\"  Aggression hasn't been bad at alI in weeks.  Meagan: he's now very dependent.  Mood/Depression: stable MDD, mood  Anxiety: stable ESHA  Panic attacks: n  Energy: baseline low  Concentration: baseline low  Insomnia: stable  Eatin lbs  Refills: y  Substances: def  Therapy: n  Medication compliant: y  SE: n  No SI HI AVH.    ...      Previous:  Still having panic attacks once a week. Sx: soa, sweating, palpitations, tachycardia, fear, no derealization/depersonalization, last 30 min, leaves room where she was at. No triggers. Fear of another panic attack happening.     Anxiety due to 's health; admitted to hospital recently for dehydration. Notes uncontrolled worrying, muscle tension, irritability, restlessness, trouble sleeping - will wake at 3 am and can't go back to sleep. Duration is at least 6 months, since moved here.    Still having hallucinations every night before she goes to bed. Sees shadows walk past bed. No AH. Sometimes sees spiders during the day on the floor; happens once or twice a week.     Seroquel helped to some extent.     Again, patient is somewhat of a poor historian. Often times she will answer a question with \"no,\" and then contradict her self moments later.    Collateral from Meagan: patient did have a UTI (UA confirmed); was put on a course of abx. Feels her mom's confusion has improved since then.      Past Surgical History:  Past Surgical History:   Procedure Laterality Date    CATARACT EXTRACTION      CHOLECYSTECTOMY OPEN         Problem List:  Patient Active Problem List   Diagnosis    Forgetfulness    Anxiety    Depression    Esophageal reflux    Hallucinations    Head injury    Hypertension    Other acute sinusitis    PTSD " (post-traumatic stress disorder)    Mixed hyperlipidemia    Acquired hypothyroidism    Vitamin D deficiency    Neuropathic pain    Closed fracture of left distal radius and ulna       Allergy:   Allergies   Allergen Reactions    Floxin Otic [Ofloxacin] Rash        Discontinued Medications:  Medications Discontinued During This Encounter   Medication Reason    QUEtiapine (SEROquel) 25 MG tablet Side effects           Current Medications:   Current Outpatient Medications   Medication Sig Dispense Refill    acetaminophen (TYLENOL) 500 MG tablet Take 1 tablet by mouth Every 6 (Six) Hours As Needed for Mild Pain.      Ascorbic Acid (Vitamin C) 250 MG chewable tablet Chew.      atorvastatin (LIPITOR) 40 MG tablet TAKE 1 TABLET BY MOUTH EVERY DAY 90 tablet 1    Brexpiprazole 1 MG tablet Take 1 tablet by mouth Every Night. 30 tablet 2    gabapentin (NEURONTIN) 300 MG capsule Take 1 capsule by mouth 2 (Two) Times a Day. 60 capsule 5    HYDROcodone-acetaminophen (Norco) 7.5-325 MG per tablet Take 1 tablet by mouth Every 6 (Six) Hours As Needed for Moderate Pain. 18 tablet 0    levothyroxine (SYNTHROID, LEVOTHROID) 75 MCG tablet TAKE 1 TABLET BY MOUTH EVERY DAY 90 tablet 1    lisinopril (PRINIVIL,ZESTRIL) 10 MG tablet Take 1 tablet by mouth Daily. 90 tablet 1    Melatonin 10 MG tablet Take 1 tablet by mouth Every Night.      metoprolol tartrate (LOPRESSOR) 25 MG tablet TAKE 2 TABLETS BY MOUTH DAILY 60 tablet 0    omeprazole (priLOSEC) 40 MG capsule TAKE 1 CAPSULE BY MOUTH EVERY DAY 90 capsule 1    venlafaxine XR (EFFEXOR-XR) 150 MG 24 hr capsule Take 1 capsule by mouth Daily. 90 capsule 1    vitamin D (ERGOCALCIFEROL) 1.25 MG (00094 UT) capsule capsule TAKE 1 CAPSULE BY MOUTH 1 TIME PER WEEK. 13 capsule 1    Zinc Sulfate (ZINC 15 PO) Take  by mouth.       No current facility-administered medications for this visit.       Past Medical History:  Past Medical History:   Diagnosis Date    Acid reflux     Allergies     Anxiety      "Depression     Forgetfulness     Hallucinations 04/15/2021    Head injury     HTN (hypertension)     PTSD (post-traumatic stress disorder)          Social History     Socioeconomic History    Marital status:    Tobacco Use    Smoking status: Never    Smokeless tobacco: Never   Vaping Use    Vaping status: Never Used   Substance and Sexual Activity    Alcohol use: Never    Drug use: Never    Sexual activity: Not Currently         Family History   Problem Relation Age of Onset    Stroke Mother     Heart disease Mother     Stroke Father     Heart disease Father     Diabetes Father     Stroke Sister     Diabetes Sister     Stroke Brother     Diabetes Brother     Diabetes Other         AUNT/UNCLE    Anxiety disorder Other        Mental Status Exam:   Hygiene:   good, at home  Cooperation:  Cooperative  Eye Contact:  Good  Psychomotor Behavior:  Appropriate  Affect:  mild anxiety, mood congruent, good variability  Mood: \"still having hallucinations\"  Speech:  Normal  Thought Process:  Goal directed  Thought Content:  Normal and Mood congruent  Suicidal:  None  Homicidal:  None  Hallucinations:  None  Delusion:  None  Memory:  Deficits  Orientation:  Grossly intact  Reliability:  fair  Insight:  Fair  Judgement:  Fair  Impulse Control:  Fair  Physical/Medical Issues:  No      Review of Systems:  Review of Systems   Constitutional:  Positive for fatigue. Negative for diaphoresis.   HENT:  Negative for drooling.    Eyes:  Negative for visual disturbance.   Respiratory:  Positive for cough. Negative for shortness of breath.    Cardiovascular:  Negative for chest pain, palpitations and leg swelling.   Gastrointestinal:  Negative for diarrhea, nausea and vomiting.   Endocrine: Negative for cold intolerance and heat intolerance.   Genitourinary:  Negative for difficulty urinating.   Musculoskeletal:  Negative for joint swelling.   Allergic/Immunologic: Negative for immunocompromised state.   Neurological:  Positive for " light-headedness. Negative for dizziness, seizures, syncope, speech difficulty, numbness and headaches.   Hematological:  Negative for adenopathy.   Psychiatric/Behavioral:  Positive for sleep disturbance.          Physical Exam:  Physical Exam    Vital Signs:   There were no vitals taken for this visit.     Lab Results:   Hospital Outpatient Visit on 06/28/2024   Component Date Value Ref Range Status    QT Interval 06/28/2024 442  ms Final    QTC Interval 06/28/2024 489  ms Final       EKG Results:  No orders to display       Imaging Results:  No Images in the past 120 days found..      Assessment & Plan   Diagnoses and all orders for this visit:    1. Hallucinations (Primary)  -     Brexpiprazole 1 MG tablet; Take 1 tablet by mouth Every Night.  Dispense: 30 tablet; Refill: 2    2. Generalized anxiety disorder  -     Brexpiprazole 1 MG tablet; Take 1 tablet by mouth Every Night.  Dispense: 30 tablet; Refill: 2    3. Forgetfulness    4. Recurrent major depressive disorder in remission    5. Neuropathic pain    6. Insomnia due to mental condition  -     Brexpiprazole 1 MG tablet; Take 1 tablet by mouth Every Night.  Dispense: 30 tablet; Refill: 2      Presentation most consistent with developing dementia, likely Lewy body dementia.  Patient also reports worsening gait abnormalities; she fell in the summer of last year, injuring her head, and requiring stitches.  Patient also has a history of depression, possible PTSD by review of chart.  Patient did not mention PTSD during the interview, but she is not a very good historian.  Often times she will say no to a question, and then contradict herself only a few moments later.  She has some trouble with knowing her medications or her medical conditions.    07/18/2024: Prolonged Qtc. Taper off seroquel and start rexulti. Other safe meds include abilify, olanzapine re: qtc.    Allowed patient to freely discuss and process issues, such as:  Anxiety and depression regarding  family conflict, relationships.  ... using Rogerian psychotherapeutic techniques including unconditional positive regard, reflective listening, and demonstrating clear empathy, with the goal of ameliorating symptoms and maintaining, restoring, or improving self-esteem, adaptive skills, and ego or psychological functions (Carmelina et al. 1991).  Time (minutes) spent providing supportive psychotherapy: 16  (This time is exclusive to the therapy session and separate from the time spent on activities used to meet the criteria for the E/M service (history, exam, medical decision-making).)  Start: 5:03  Stop: 5:19  Functional status: mild impairment  Treatment plan: Medication management and supportive psychotherapy  Prognosis: good  Progress: VH  2w    06/14/2024: EKG now, if qtc reassuring, increase qhs quetiapine to 100 mg. Utilize distraction and possibly abilify to manage Ck's sundowning.     5/24/24:  now on abilify, much better in terms of aggression, but more dependent. Meagan trying to get him approved for medicaid waiver to get him into a home. Referred to Office of Dementia Services at [phone], also [facility] @ [phone]. Close follow up to discuss pt's symptoms in depth.    2/26: Stable, well, discussed strategy to manage 's behavior. Meagan will call Dr. Rivero to inform her of his behavior, then they will call me back. Once med changes are made, plan is to put  in good spirits by giving him sugar free ice cream (vanilla) first, then offering meds.    10/26: Now sleeping. Discussed further strategies for , such as proactively getting him his meds every morning. Meagan is in agreement. Discuss sleep study next visit.    9/27: Trouble snoring, maintenance insomnia. Sleep study. Start melatonin. Fear of spiders getting on the bed, but no AVH (but history of them).    7/25: Behavioral strategies have helped patient. Patient still not getting out of the house,  agrees to try at least 1x/wk. Stable, nearly at goal.     6/20: Stable, well. Discussed behavioral strategies to manage 's behavior.     5/1: Stable. Couldn't figure out Twilio. Short visit. See back in person in 6 wks to discuss strategies for mx behavioral concerns.     1/31: Seroquel dose in the afternoon helps, but the real problem is a situational stressor that cannot be solved with the medication.  We brainstormed possible ideas regarding how to manage patient's 's behavior.  We discussed having him accompany patient to her appointments to develop familiarity with me in order for me to potentially take over her medications.  They do not think that will help.      11/30: Buspar not helpful. Stop. Still anxious and depressed at 3 - 7 pm, strangely. Target with a dose of seroquel.  Can also try increasing frequency of gabapentin, low-dose trazodone.      8/31: Pt's anxiety comes from taking care of her father, per Meagan. He's dealing with depression. Meagan is stressed. Discussed getting more help to take care of pt and her father. Pt is better. No changes to meds.     7/19: Increase effexor to target residual anx and dep.  Consider increasing BuSpar soon at next appointment.      4/19: Patient is well and right where she should be.     3/8: Target the feeling of anxiety at 4 PM by starting BuSpar at 3 PM.  Consider switching to gabapentin if BuSpar does not help.  Would start at a low dose of 100 mg.      11/8: Depressed in the mornings. Light box, increase venlafaxine.     9/7: Doing even better.  8 weeks.  No changes.    7/27: Markedly improved, and far more linear.  Still has residual anxiety and depression.  Hallucinations are still present but they do not cause distress at all to the patient.  Continue Seroquel at the present dose.  Double the dose of venlafaxine. See back in 6 weeks.  This was also discussed with Meagan, patient's daughter.    Previous:  Caution again advised in terms of  increasing the dose given the likely sensitivity she has to antipsychotics.  Psychotherapy is deferred at this time.  S/p referral to neurologist is appropriate for further management.     Visit Diagnoses:    ICD-10-CM ICD-9-CM   1. Hallucinations  R44.3 780.1   2. Generalized anxiety disorder  F41.1 300.02   3. Forgetfulness  R68.89 780.99   4. Recurrent major depressive disorder in remission  F33.40 296.35   5. Neuropathic pain  M79.2 729.2   6. Insomnia due to mental condition  F51.05 300.9     327.02       PLAN:  Risk Assessment: Risk of self-harm acutely is low. Risk factors include anxiety disorder, depressive disorder, access to weapons, recent psychosocial stressors (pandemic). Protective factors include no family history, no present SI, no history of suicide attempts or self-harm in the past, no access to weapons, no AODA, healthcare seeking, future orientation, willingness to engage in care. Risk of self-harm chronically is also low, but could be further elevated in the event of treatment noncompliance and/or AODA.  Safety: No acute safety concerns.  Medications:   RESTART melatonin 10 mg qhs. Risks, benefits, side effects discussed with patient including sedation, dizziness/falls risk, GI upset.  Do not use before operating vehicle, vessel, or machine. After discussion of these risks and benefits, the patient voiced understanding and agreed to proceed.   REDUCE quetiapine 50 mg nightly, 25 mg q2pm by discontinuing q2pm dose for 3 days, then reducing nightly dose to 25 mg for 3 days then STOP. Prolonged qtc 7/24.  AFTER stopping quetiapine, start rexult 0.5 mg qhs x7d, then 1 mg qhs. Risks, benefits, alternatives discussed with patient including increased energy, exacerbation of irritability, weight gain, akathisia, GI upset, tardive dyskinesia/dystonia, movement issues, extrapyramidal symptoms, orthostatic hypotension.  Use care when operating vehicle, vessel, or machine. After discussion of these risks  and benefits, the patient voiced understanding and agreed to proceed.  CONTINUE venlafaxine 150 mg PO QDAY. Risks, benefits, alternatives discussed with patient including GI upset, nausea vomiting diarrhea, theoretical decrease of seizure threshold predisposing the patient to a slightly higher seizure risk, headaches, sexual dysfunction, serotonin syndrome, bleeding risk, increased suicidality in patients 24 years and younger.  After discussion of these risks and benefits, the patient voiced understanding and agreed to proceed.  CONTINUE gabapentin 300 twice daily. Risks, benefits, alternatives discussed with patient including sedation, dizziness/falls risk, GI upset, weight gain.  After discussion of these risks and benefits, the patient voiced understanding and agreed to proceed. UDS ordered, Ernestina reviewed.  S/P:  BuSpar 10 mg at 3 PM daily. Ineffective.  sertraline 100 mg daily.  Not effective.  Therapy: Deferred.  Other: s/p referral to neurology for workup of likely dementia.      TREATMENT PLAN/GOALS: Continue supportive psychotherapy efforts and medications as indicated. Treatment and medication options discussed during today's visit. Patient ackowledged and verbally consented to continue with current treatment plan and was educated on the importance of compliance with treatment and follow-up appointments.    MEDICATION ISSUES:  ERNESTINA reviewed as expected.  Discussed medication options and treatment plan of prescribed medication as well as the risks, benefits, and side effects including potential falls, possible impaired driving and metabolic adversities among others. Patient is agreeable to call the office with any worsening of symptoms or onset of side effects. Patient is agreeable to call 911 or go to the nearest ER should he/she begin having SI/HI. No medication side effects or related complaints today.     MEDS ORDERED DURING VISIT:  New Medications Ordered This Visit   Medications    Brexpiprazole 1  MG tablet     Sig: Take 1 tablet by mouth Every Night.     Dispense:  30 tablet     Refill:  2     Replaces quetiapine.       Return in about 2 weeks (around 8/1/2024) for Video visit.         This document has been electronically signed by Riki Peterson MD  July 18, 2024 17:20 EDT      Part of this note may be an electronic transcription/translation of spoken language to printed text using the Dragon Dictation System.

## 2024-07-18 NOTE — PATIENT INSTRUCTIONS
1.  Please return to clinic at your next scheduled visit.  Contact the clinic ([phone]) at least 24 hours prior in the event you need to cancel.  2.  Do no harm to yourself or others.    3.  Avoid alcohol and drugs.    4.  Take all medications as prescribed.  Please contact the clinic with any concerns. If you are in need of medication refills, please call the clinic at [phone].    5. Should you want to get in touch with your provider, Dr. Riki Peterson, please utilize [employer] or contact the office ([phone]), and staff will be able to page Dr. Peterson directly.  6.  In the event you have personal crisis, contact the following crisis numbers: Suicide Prevention Hotline 1-327.448.2680; TATE Helpline 6-191-538-TATE; [facility] Emergency Room [phone]; text HELLO to [phone]; or 638.

## 2024-07-22 ENCOUNTER — TELEPHONE (OUTPATIENT)
Dept: PSYCHIATRY | Facility: CLINIC | Age: 74
End: 2024-07-22
Payer: MEDICARE

## 2024-07-22 DIAGNOSIS — R44.3 HALLUCINATIONS: Primary | ICD-10-CM

## 2024-07-22 DIAGNOSIS — F41.1 GENERALIZED ANXIETY DISORDER: ICD-10-CM

## 2024-07-22 DIAGNOSIS — F51.05 INSOMNIA DUE TO MENTAL CONDITION: ICD-10-CM

## 2024-07-22 DIAGNOSIS — F33.0 MILD RECURRENT MAJOR DEPRESSION: ICD-10-CM

## 2024-07-22 RX ORDER — ARIPIPRAZOLE 2 MG/1
2 TABLET ORAL DAILY
Qty: 30 TABLET | Refills: 2 | Status: SHIPPED | OUTPATIENT
Start: 2024-07-22 | End: 2024-07-25 | Stop reason: SDUPTHER

## 2024-07-22 NOTE — TELEPHONE ENCOUNTER
Brexpiprazole 1 MG tablet [[id number]] (Order [id number])    Pt's EC (emergency contact) called in regards of medication. This medication was going to cost patient $500 for a one month supply and asked if there was something we could switch this too.

## 2024-07-22 NOTE — TELEPHONE ENCOUNTER
Trial of abilify 2 mg nightly. Sent in. Same side effects. Risks, benefits, alternatives discussed with patient including increased energy, exacerbation of irritability, akathisia, movement issues, GI upset, orthostatic hypotension, increased appetite, tardive dyskinesia.  Use care when operating vehicle, vessel, or machine. After discussion of these risks and benefits, the patient voiced understanding and agreed to proceed.

## 2024-07-24 NOTE — TELEPHONE ENCOUNTER
PT(PATIENT) EC VERIFIED     CONTACTED PT(PATIENT) EC PER PROVIDER'S INSTRUCTIONS     ARIPiprazole (Abilify) 2 MG tablet (2024)   Dispense Quantity: 30 tablet Refills: 2          Sig: Take 1 tablet by mouth Daily.     PT(PATIENT) EC STATES SHE HAS NOT YET PICKED UP THE MEDICATION     PT(PATIENT) EC STATES SHE PT(PATIENT) HAS STOPPED THE 2PM  QUEtiapine (SEROquel) 25 MG tablet (2024)   Dispense Quantity: 90 tablet Refills: 5          Sig: TAKE 1 TABLET BY MOUTH AT 2PM AND 2 TABLETS NIGHTLY   BUT IS HAVING PANIC ATTACKS    PT(PATIENT) EC STATES PT(PATIENT) IS STILL TAKING THE 2 TABS AT NIGHT     PT(PATIENT) EC STATES PT(PATIENT) HAS ALSO HAD CRYING SPELLS     NEXT APPT WITH PROVIDER   Appointment with Riki Peterson MD (2024)     PLEASE ADVISE

## 2024-07-24 NOTE — TELEPHONE ENCOUNTER
I'm sorry to hear she's still having symptoms. If she's able to maintain safety, this would be better to wait for Dr. Peterson's consideration when he returns tomorrow. If she's unable to maintain safety she should visit the ED for evaluation.

## 2024-07-24 NOTE — TELEPHONE ENCOUNTER
Caller: Nilda Morrison    Relationship: Emergency Contact    Best call back number: [phone]    Requested Prescriptions:   Requested Prescriptions     Pending Prescriptions Disp Refills    metoprolol tartrate (LOPRESSOR) 25 MG tablet [Pharmacy Med Name: METOPROLOL TARTRATE 25 MG TAB] 60 tablet 0     Sig: TAKE 2 TABLETS BY MOUTH EVERY DAY        Pharmacy where request should be sent: [facility]/PHARMACY #[id number] - [place], [place] - [address] [facility] - [phone] [facility] [phone] FX     Last office visit with prescribing clinician: 9/28/2023   Last telemedicine visit with prescribing clinician: Visit date not found   Next office visit with prescribing clinician: Visit date not found     Additional details provided by patient:   PATIENT'S DAUGHTER, NILDA, IS CHECKING ON THIS REFILL REQUEST AS PATIENT HAS NOT RECEIVED THE REFILL YET. SHE WAS ADVISED FROM CLINICAL STAFF THAT [facility] WOULD APPROVE THE REQUEST AND SEND IT OVER.     Does the patient have less than a 3 day supply:  [x] Yes  [] No    Would you like a call back once the refill request has been completed: [x] Yes [] No    If the office needs to give you a call back, can they leave a voicemail: [x] Yes [] No    Alice Arana   07/24/24 16:52 EDT

## 2024-07-25 RX ORDER — ARIPIPRAZOLE 2 MG/1
2 TABLET ORAL DAILY
Qty: 30 TABLET | Refills: 2 | Status: SHIPPED | OUTPATIENT
Start: 2024-07-25

## 2024-07-25 NOTE — TELEPHONE ENCOUNTER
Called, lmtcb.     Got a callback.  had to be rushed to the hospital this am (nonresponsive).     There appears to be some confusion regarding the medications.  The pharmacy apparently never received the Abilify prescription.  Rexulti was too expensive.  The patient has not been transitioned to any new medication at this time, even though she is being tapered off of the Seroquel, which is leading to difficulties with depression.    Counseled her daughter, Meagan, to restart Seroquel, obtain the Abilify which I just resent in, and only after she is able to give her Abilify to start to taper down on the Seroquel.    In other words, continue Seroquel until they get and start Abilify, and only then taper down on the Seroquel.    All questions answered.  No SI HI.

## 2024-08-08 ENCOUNTER — TELEPHONE (OUTPATIENT)
Dept: PSYCHIATRY | Facility: CLINIC | Age: 74
End: 2024-08-08
Payer: MEDICARE

## 2024-08-08 DIAGNOSIS — F33.40 RECURRENT MAJOR DEPRESSIVE DISORDER IN REMISSION: ICD-10-CM

## 2024-08-08 DIAGNOSIS — F41.1 GENERALIZED ANXIETY DISORDER: ICD-10-CM

## 2024-08-08 DIAGNOSIS — F51.05 INSOMNIA DUE TO MENTAL CONDITION: Primary | ICD-10-CM

## 2024-08-08 RX ORDER — MIRTAZAPINE 7.5 MG/1
7.5 TABLET, FILM COATED ORAL NIGHTLY
Qty: 30 TABLET | Refills: 2 | Status: SHIPPED | OUTPATIENT
Start: 2024-08-08

## 2024-08-08 NOTE — TELEPHONE ENCOUNTER
Called, spoke with Meagan. Trial of mirtazapine 7.5 mg qhs for insomnia. Anxiety calming down so far on abilify.    Risks, benefits, alternatives discussed with patient including GI upset, sedation, dizziness with falls risk, increased appetite.  Do not use before operating vehicle, vessel, or machine. After discussion of these risks and benefits, the patient voiced understanding and agreed to proceed.

## 2024-08-08 NOTE — TELEPHONE ENCOUNTER
DAUGHTER CALLED AND SAID THAT PT HAS NOT BEEN SLEEPING AND WANTS TO KNOW IF A SLEEPING MEDICATION CAN BE ADDED.  PT HAS PREVIOUSLY HAD SEROQUEL AND CURRENTLY TAKES MELATONIN 10MG (MAYBE 20) AT NIGHT.  PROVIDER PLEASE ADVISE

## 2024-08-09 RX ORDER — LEVOTHYROXINE SODIUM 0.07 MG/1
TABLET ORAL
Qty: 90 TABLET | Refills: 1 | Status: SHIPPED | OUTPATIENT
Start: 2024-08-09

## 2024-08-22 NOTE — TELEPHONE ENCOUNTER
LOV: 03/14/2022  Last Fill: 05/16/2022  UDS: 03/30/2021   Consent: 03/15/2022    Please advise refills    Patient: Jersey Caceres    Procedure Information       Date/Time: 08/22/24 1330    Procedure: Phacoemulsification Cataract with Insertion Intraocular Lens (Left: Eye)    Location: [facility] OR  / [facility] OR    Surgeons: Iker Gavin MD            Relevant Problems   Cardiac   (+) ASHD (arteriosclerotic heart disease)   (+) Benign essential hypertension   (+) Hyperlipidemia      GI   (+) Chronic GERD      /Renal   (+) Benign prostatic hyperplasia without urinary obstruction      Endocrine   (+) Type 2 diabetes mellitus without retinopathy (Multi)       Clinical information reviewed:   Tobacco  Allergies  Meds   Med Hx  Surg Hx   Fam Hx  Soc Hx        NPO Detail:  No data recorded     Physical Exam    Airway  Mallampati: II  TM distance: >3 FB  Neck ROM: full     Cardiovascular   Rhythm: regular  Rate: normal     Dental - normal exam     Pulmonary   Breath sounds clear to auscultation     Abdominal          Anesthesia Plan    History of general anesthesia?: yes  History of complications of general anesthesia?: no    ASA 2     MAC

## 2024-08-27 ENCOUNTER — TELEMEDICINE (OUTPATIENT)
Dept: PSYCHIATRY | Facility: CLINIC | Age: 74
End: 2024-08-27
Payer: MEDICARE

## 2024-08-27 DIAGNOSIS — F51.05 INSOMNIA DUE TO MENTAL CONDITION: ICD-10-CM

## 2024-08-27 DIAGNOSIS — F33.0 MILD RECURRENT MAJOR DEPRESSION: ICD-10-CM

## 2024-08-27 DIAGNOSIS — M79.2 NEUROPATHIC PAIN: ICD-10-CM

## 2024-08-27 DIAGNOSIS — R68.89 FORGETFULNESS: ICD-10-CM

## 2024-08-27 DIAGNOSIS — F33.40 RECURRENT MAJOR DEPRESSIVE DISORDER IN REMISSION: ICD-10-CM

## 2024-08-27 DIAGNOSIS — R44.3 HALLUCINATIONS: ICD-10-CM

## 2024-08-27 DIAGNOSIS — F41.1 GENERALIZED ANXIETY DISORDER: Primary | ICD-10-CM

## 2024-08-27 PROCEDURE — 1159F MED LIST DOCD IN RCRD: CPT | Performed by: STUDENT IN AN ORGANIZED HEALTH CARE EDUCATION/TRAINING PROGRAM

## 2024-08-27 PROCEDURE — 99214 OFFICE O/P EST MOD 30 MIN: CPT | Performed by: STUDENT IN AN ORGANIZED HEALTH CARE EDUCATION/TRAINING PROGRAM

## 2024-08-27 PROCEDURE — 1160F RVW MEDS BY RX/DR IN RCRD: CPT | Performed by: STUDENT IN AN ORGANIZED HEALTH CARE EDUCATION/TRAINING PROGRAM

## 2024-08-27 PROCEDURE — 90833 PSYTX W PT W E/M 30 MIN: CPT | Performed by: STUDENT IN AN ORGANIZED HEALTH CARE EDUCATION/TRAINING PROGRAM

## 2024-08-27 RX ORDER — MIRTAZAPINE 15 MG/1
22.5 TABLET, FILM COATED ORAL NIGHTLY
Qty: 135 TABLET | Refills: 1 | Status: SHIPPED | OUTPATIENT
Start: 2024-08-27

## 2024-08-27 RX ORDER — ARIPIPRAZOLE 5 MG/1
5 TABLET ORAL DAILY
Qty: 90 TABLET | Refills: 1 | Status: SHIPPED | OUTPATIENT
Start: 2024-08-27

## 2024-08-27 NOTE — PATIENT INSTRUCTIONS
1.  Please return to clinic at your next scheduled visit.  Contact the clinic ([phone]) at least 24 hours prior in the event you need to cancel.  2.  Do no harm to yourself or others.    3.  Avoid alcohol and drugs.    4.  Take all medications as prescribed.  Please contact the clinic with any concerns. If you are in need of medication refills, please call the clinic at [phone].    5. Should you want to get in touch with your provider, Dr. Riki Peterson, please utilize [employer] or contact the office ([phone]), and staff will be able to page Dr. Peterson directly.  6.  In the event you have personal crisis, contact the following crisis numbers: Suicide Prevention Hotline 1-328.477.3368; TATE Helpline 8-766-755-TATE; Psychiatric Emergency Room [phone]; text HELLO to 895859; or 641.

## 2024-08-27 NOTE — PROGRESS NOTES
"Subjective   Payal Singer is a 74 y.o. female who presents today for follow up    Chief Complaint:  mdd meagan    History of Present Illness:     Initial Chart review 4/15/21: Seen 3/30 to establish care. Hx of anx/dep avh in [place]. on both sertraline and venlafaxine, was on Seroquel. Hx of HTN, GERD. On gabapentin 600 tid, sertraline 200 d, venla 150 d, quetiap 25 qhs. 2021 labs: cbc shows low mch, high rdw; Cr 1.15 high, abnormal lipids, TSH and fT4 wnl. No OP head imaging, ekg.     Discussed behavioral strategies to manage 's behavior.  Calming:  Children or friends calling on the phone  Meals available (food)  snacks    \"Payal\" and \"Meagan\" and \"Ck\" her     Chart review:   2024: no visits. Started on mirtazapine, doubled abilify.  24: EKG qtc 489.  24: no visits.  24: no visits.  : UC frx of left ulna, ortho x3,   Int med, reassuring rpr/b12/vit D/hcv/hyroid studies/cbc. Cmp: elev gluc 103, cr 1.04, ap 137.  No visits.  no new    Plannin2024: Prolonged Qtc. Taper off seroquel and start rexulti. Other safe meds include abilify, olanzapine re: qtc.  2024: EKG now, if qtc reassuring, increase qhs quetiapine to 100 mg. Utilize distraction and possibly abilify to manage Ck's sundowning.   24:  now on abilify, much better in terms of aggression, but more dependent. Meagan trying to get him approved for medicaid waiver to get him into a home. [place] [facility] of D[facility] [place] [phone][place] [facility] [phone] Close follow up to discuss pt's symptoms in depth.  : Stable, well, discussed strategy to manage 's behavior. Meagan will call Dr. Rivero to inform her of his behavior, then they will call me back. Once med changes are made, plan is to put  in good spirits by giving him sugar free ice cream (vanilla) first, then offering meds.  10/26: Now sleeping. Discussed further strategies for " ", such as proactively getting him his meds every morning. Meagan is in agreement. Discuss sleep study next visit.    Visits (Below):    2024:   Virtual visit via Zoom audio and video due to the COVID-19 pandemic.  Patient is accepting of and agreeable to visit.  The visit consisted of the patient and I. Patient is at home, and I am at the office.  Interview:  \"You're still crying every day.\" (Meagan to her Mom)  Improving hallucinations  Still some anxiety and crying spells  Now sleeping  Crying spells relate to Ck now being at [facility] (guilt)  Mood/Depression: MDD depressed mood  Anxiety: ESHA on edge  Panic attacks: stable  Energy: baseline low  Concentration: baseline low  Insomnia: stable  Eatin lbs  Refills: y  Substances: denies  Therapy: n  Medication compliant: y  SE: n  No SI HI AVH, but has seen them recently      2024: Virtual visit via Zoom audio and video due to the COVID-19 pandemic.  Patient is accepting of and agreeable to visit.  The visit consisted of the patient and I. Patient is at home, and I am at the office.  Interview:  \" We just got the EKG.\"  No change to hallucinations.  Prolonged qtc  Has not been evaluated for dementia.  Mood/Depression: stable MDD  Anxiety: ESHA on edge  Panic attacks: n  Energy: baseline low  Concentration: baseline low  Insomnia: stable  Eatin lbs  Refills: y  Substances: def  Therapy: n  Medication compliant: y  SE: n  No SI HI AVH, but has seen them recently      2024: Virtual visit via Zoom audio and video due to the COVID-19 pandemic.  Patient is accepting of and agreeable to visit.  The visit consisted of the patient and I. Patient is at home, and I am at the office.  Interview:  \"I've been having hallucinations.\"  Sees them right before dark: people coming up to me, standing next to me. No AH, only VH. Has seen spiders at night (woke her up).  Pt has insight into the fact that they are not hallucinations  At one point, it " "appeared we had the hallucinations under control, but pt denies this now  Has not been evaluated for dementia  Mood/Depression: stable MDD, mood  Anxiety: ESHA some feeling on edge  Panic attacks: n  Energy: baseline low  Concentration: baseline low  Insomnia: stable  Eatin lbs  Refills: y  Substances: def  Therapy: n  Medication compliant: y  SE: n  No SI HI AVH.      2024: Virtual visit via Zoom audio and video due to the COVID-19 pandemic.  Patient is accepting of and agreeable to visit.  The visit consisted of the patient and I. Patient is at home, and I am at the office.  Interview:  Plannin/26: Stable, well, discussed strategy to manage 's behavior. Meagan will call Dr. Rivero to inform her of his behavior, then they will call me back. Once med changes are made, plan is to put  in good spirits by giving him sugar free ice cream (vanilla) first, then offering meds.  10/26: Now sleeping. Discussed further strategies for , such as proactively getting him his meds every morning. Meagan is in agreement. Discuss sleep study next visit.  \"He keeps asking the same question again and again.\"  Aggression hasn't been bad at alI in weeks.  Meagan: he's now very dependent.  Mood/Depression: stable MDD, mood  Anxiety: stable ESHA  Panic attacks: n  Energy: baseline low  Concentration: baseline low  Insomnia: stable  Eatin lbs  Refills: y  Substances: def  Therapy: n  Medication compliant: y  SE: n  No SI HI AVH.    ...      Previous:  Still having panic attacks once a week. Sx: soa, sweating, palpitations, tachycardia, fear, no derealization/depersonalization, last 30 min, leaves room where she was at. No triggers. Fear of another panic attack happening.     Anxiety due to 's health; admitted to hospital recently for dehydration. Notes uncontrolled worrying, muscle tension, irritability, restlessness, trouble sleeping - will wake at 3 am and can't go back to sleep. Duration is " "at least 6 months, since moved here.    Still having hallucinations every night before she goes to bed. Sees shadows walk past bed. No AH. Sometimes sees spiders during the day on the floor; happens once or twice a week.     Seroquel helped to some extent.     Again, patient is somewhat of a poor historian. Often times she will answer a question with \"no,\" and then contradict her self moments later.    Collateral from Meagan: patient did have a UTI (UA confirmed); was put on a course of abx. Feels her mom's confusion has improved since then.      Past Surgical History:  Past Surgical History:   Procedure Laterality Date    CATARACT EXTRACTION  2002    CHOLECYSTECTOMY OPEN  1971       Problem List:  Patient Active Problem List   Diagnosis    Forgetfulness    Anxiety    Depression    Esophageal reflux    Hallucinations    Head injury    Hypertension    Other acute sinusitis    PTSD (post-traumatic stress disorder)    Mixed hyperlipidemia    Acquired hypothyroidism    Vitamin D deficiency    Neuropathic pain    Closed fracture of left distal radius and ulna       Allergy:   Allergies   Allergen Reactions    Floxin Otic [Ofloxacin] Rash        Discontinued Medications:  Medications Discontinued During This Encounter   Medication Reason    ARIPiprazole (Abilify) 2 MG tablet Reorder    mirtazapine (REMERON) 7.5 MG tablet Reorder             Current Medications:   Current Outpatient Medications   Medication Sig Dispense Refill    ARIPiprazole (Abilify) 5 MG tablet Take 1 tablet by mouth Daily. 90 tablet 1    mirtazapine (REMERON) 15 MG tablet Take 1.5 tablets by mouth Every Night. 135 tablet 1    acetaminophen (TYLENOL) 500 MG tablet Take 1 tablet by mouth Every 6 (Six) Hours As Needed for Mild Pain.      Ascorbic Acid (Vitamin C) 250 MG chewable tablet Chew.      atorvastatin (LIPITOR) 40 MG tablet TAKE 1 TABLET BY MOUTH EVERY DAY 90 tablet 1    gabapentin (NEURONTIN) 300 MG capsule Take 1 capsule by mouth 2 (Two) Times a " Day. 60 capsule 5    HYDROcodone-acetaminophen (Norco) 7.5-325 MG per tablet Take 1 tablet by mouth Every 6 (Six) Hours As Needed for Moderate Pain. 18 tablet 0    levothyroxine (SYNTHROID, LEVOTHROID) 75 MCG tablet TAKE 1 TABLET BY MOUTH EVERY DAY 90 tablet 1    lisinopril (PRINIVIL,ZESTRIL) 10 MG tablet Take 1 tablet by mouth Daily. 90 tablet 1    Melatonin 10 MG tablet Take 1 tablet by mouth Every Night.      metoprolol tartrate (LOPRESSOR) 25 MG tablet TAKE 2 TABLETS BY MOUTH EVERY DAY 60 tablet 0    omeprazole (priLOSEC) 40 MG capsule TAKE 1 CAPSULE BY MOUTH EVERY DAY 90 capsule 1    venlafaxine XR (EFFEXOR-XR) 150 MG 24 hr capsule Take 1 capsule by mouth Daily. 90 capsule 1    vitamin D (ERGOCALCIFEROL) 1.25 MG (29684 UT) capsule capsule TAKE 1 CAPSULE BY MOUTH 1 TIME PER WEEK. 13 capsule 1    Zinc Sulfate (ZINC 15 PO) Take  by mouth.       No current facility-administered medications for this visit.       Past Medical History:  Past Medical History:   Diagnosis Date    Acid reflux     Allergies     Anxiety     Depression     Forgetfulness     Hallucinations 04/15/2021    Head injury     HTN (hypertension)     PTSD (post-traumatic stress disorder)          Social History     Socioeconomic History    Marital status:    Tobacco Use    Smoking status: Never    Smokeless tobacco: Never   Vaping Use    Vaping status: Never Used   Substance and Sexual Activity    Alcohol use: Never    Drug use: Never    Sexual activity: Not Currently         Family History   Problem Relation Age of Onset    Stroke Mother     Heart disease Mother     Stroke Father     Heart disease Father     Diabetes Father     Stroke Sister     Diabetes Sister     Stroke Brother     Diabetes Brother     Diabetes Other         AUNT/UNCLE    Anxiety disorder Other        Mental Status Exam:   Hygiene:   good, at home  Cooperation:  Cooperative  Eye Contact:  Good  Psychomotor Behavior:  Appropriate  Affect:  euthymic, mood congruent, good  "variability  Mood: \"the hallucinations are better\"  Speech:  Normal  Thought Process:  Goal directed  Thought Content:  Normal and Mood congruent  Suicidal:  None  Homicidal:  None  Hallucinations:  None  Delusion:  None  Memory:  Deficits  Orientation:  Grossly intact  Reliability:  fair  Insight:  Fair  Judgement:  Fair  Impulse Control:  Fair  Physical/Medical Issues:  No      Review of Systems:  Review of Systems   Constitutional:  Positive for fatigue. Negative for diaphoresis.   HENT:  Negative for drooling.    Eyes:  Negative for visual disturbance.   Respiratory:  Positive for cough. Negative for shortness of breath.    Cardiovascular:  Negative for chest pain, palpitations and leg swelling.   Gastrointestinal:  Negative for diarrhea, nausea and vomiting.   Endocrine: Negative for cold intolerance and heat intolerance.   Genitourinary:  Negative for difficulty urinating.   Musculoskeletal:  Negative for joint swelling.   Allergic/Immunologic: Negative for immunocompromised state.   Neurological:  Positive for light-headedness. Negative for dizziness, seizures, syncope, speech difficulty, numbness and headaches.   Hematological:  Negative for adenopathy.   Psychiatric/Behavioral:  Positive for sleep disturbance.          Physical Exam:  Physical Exam    Vital Signs:   There were no vitals taken for this visit.     Lab Results:   Hospital Outpatient Visit on 06/28/2024   Component Date Value Ref Range Status    QT Interval 06/28/2024 442  ms Final    QTC Interval 06/28/2024 489  ms Final       EKG Results:  No orders to display       Imaging Results:  No Images in the past 120 days found..      Assessment & Plan   Diagnoses and all orders for this visit:    1. Generalized anxiety disorder (Primary)  -     mirtazapine (REMERON) 15 MG tablet; Take 1.5 tablets by mouth Every Night.  Dispense: 135 tablet; Refill: 1  -     ARIPiprazole (Abilify) 5 MG tablet; Take 1 tablet by mouth Daily.  Dispense: 90 tablet; " Refill: 1    2. Insomnia due to mental condition  -     mirtazapine (REMERON) 15 MG tablet; Take 1.5 tablets by mouth Every Night.  Dispense: 135 tablet; Refill: 1  -     ARIPiprazole (Abilify) 5 MG tablet; Take 1 tablet by mouth Daily.  Dispense: 90 tablet; Refill: 1    3. Hallucinations  -     ARIPiprazole (Abilify) 5 MG tablet; Take 1 tablet by mouth Daily.  Dispense: 90 tablet; Refill: 1    4. Mild recurrent major depression  -     ARIPiprazole (Abilify) 5 MG tablet; Take 1 tablet by mouth Daily.  Dispense: 90 tablet; Refill: 1    5. Forgetfulness    6. Neuropathic pain    7. Recurrent major depressive disorder in remission  -     mirtazapine (REMERON) 15 MG tablet; Take 1.5 tablets by mouth Every Night.  Dispense: 135 tablet; Refill: 1      Presentation most consistent with developing dementia, likely Lewy body dementia.  Patient also reports worsening gait abnormalities; she fell in the summer of last year, injuring her head, and requiring stitches.  Patient also has a history of depression, possible PTSD by review of chart.  Patient did not mention PTSD during the interview, but she is not a very good historian.  Often times she will say no to a question, and then contradict herself only a few moments later.  She has some trouble with knowing her medications or her medical conditions.    08/27/2024: Improving, increase abilify and mirtazapine by small amounts. Some guilt confounding crying spells.    Allowed patient to freely discuss and process issues, such as:  Anxiety and depression regarding family conflict/relationships.  Depression related to Ck now being at [facility].  ... using Rogerian psychotherapeutic techniques including unconditional positive regard, reflective listening, and demonstrating clear empathy, with the goal of ameliorating symptoms and maintaining, restoring, or improving self-esteem, adaptive skills, and ego or psychological functions (Carmelina et al. 1991), the long-term goal of  which is to develop a better, healthier perspective and help the patient bear their circumstances more easily.  Time (minutes) spent providing supportive psychotherapy: 16  (This time is exclusive to the therapy session and separate from the time spent on activities used to meet the criteria for the E/M service (history, exam, medical decision-making).)  Start: 5:00  Stop: 5:16  Functional status: mild impairment  Treatment plan: Medication management and supportive psychotherapy  Prognosis: good  Progress: VH, depression -- improving  4w    07/18/2024: Prolonged Qtc. Taper off seroquel and start rexulti. Other safe meds include abilify, olanzapine re: qtc.    06/14/2024: EKG now, if qtc reassuring, increase qhs quetiapine to 100 mg. Utilize distraction and possibly abilify to manage Ck's sundowning.     5/24/24:  now on abilify, much better in terms of aggression, but more dependent. Meagan trying to get him approved for medicaid waiver to get him into a home. Referred to Office of Dementia Services at [phone], also [facility] [place] [phone]. Close follow up to discuss pt's symptoms in depth.    2/26: Stable, well, discussed strategy to manage 's behavior. Meagan will call Dr. Rivero to inform her of his behavior, then they will call me back. Once med changes are made, plan is to put  in good spirits by giving him sugar free ice cream (vanilla) first, then offering meds.    10/26: Now sleeping. Discussed further strategies for , such as proactively getting him his meds every morning. Meagan is in agreement. Discuss sleep study next visit.    9/27: Trouble snoring, maintenance insomnia. Sleep study. Start melatonin. Fear of spiders getting on the bed, but no AVH (but history of them).    7/25: Behavioral strategies have helped patient. Patient still not getting out of the house, agrees to try at least 1x/wk. Stable, nearly at goal.     6/20: Stable, well. Discussed  behavioral strategies to manage 's behavior.     5/1: Stable. Couldn't figure out Twilio. Short visit. See back in person in 6 wks to discuss strategies for mx behavioral concerns.     1/31: Seroquel dose in the afternoon helps, but the real problem is a situational stressor that cannot be solved with the medication.  We brainstormed possible ideas regarding how to manage patient's 's behavior.  We discussed having him accompany patient to her appointments to develop familiarity with me in order for me to potentially take over her medications.  They do not think that will help.      11/30: Buspar not helpful. Stop. Still anxious and depressed at 3 - 7 pm, strangely. Target with a dose of seroquel.  Can also try increasing frequency of gabapentin, low-dose trazodone.      8/31: Pt's anxiety comes from taking care of her father, per Meagan. He's dealing with depression. Meagan is stressed. Discussed getting more help to take care of pt and her father. Pt is better. No changes to meds.     7/19: Increase effexor to target residual anx and dep.  Consider increasing BuSpar soon at next appointment.      4/19: Patient is well and right where she should be.     3/8: Target the feeling of anxiety at 4 PM by starting BuSpar at 3 PM.  Consider switching to gabapentin if BuSpar does not help.  Would start at a low dose of 100 mg.      11/8: Depressed in the mornings. Light box, increase venlafaxine.     9/7: Doing even better.  8 weeks.  No changes.    7/27: Markedly improved, and far more linear.  Still has residual anxiety and depression.  Hallucinations are still present but they do not cause distress at all to the patient.  Continue Seroquel at the present dose.  Double the dose of venlafaxine. See back in 6 weeks.  This was also discussed with Meagan, patient's daughter.    Previous:  Caution again advised in terms of increasing the dose given the likely sensitivity she has to antipsychotics.  Psychotherapy  is deferred at this time.  S/p referral to neurologist is appropriate for further management.     Visit Diagnoses:    ICD-10-CM ICD-9-CM   1. Generalized anxiety disorder  F41.1 300.02   2. Insomnia due to mental condition  F51.05 300.9     327.02   3. Hallucinations  R44.3 780.1   4. Mild recurrent major depression  F33.0 296.31   5. Forgetfulness  R68.89 780.99   6. Neuropathic pain  M79.2 729.2   7. Recurrent major depressive disorder in remission  F33.40 296.35       PLAN:  Risk Assessment: Risk of self-harm acutely is low. Risk factors include anxiety disorder, depressive disorder, access to weapons, recent psychosocial stressors (pandemic). Protective factors include no family history, no present SI, no history of suicide attempts or self-harm in the past, no access to weapons, no AODA, healthcare seeking, future orientation, willingness to engage in care. Risk of self-harm chronically is also low, but could be further elevated in the event of treatment noncompliance and/or AODA.  Safety: No acute safety concerns.  Medications:   CONTINUE melatonin 10 mg qhs. Risks, benefits, side effects discussed with patient including sedation, dizziness/falls risk, GI upset.  Do not use before operating vehicle, vessel, or machine. After discussion of these risks and benefits, the patient voiced understanding and agreed to proceed.   INCREASE mirtazapine 15 to 22.5 mg qhs. Risks, benefits, alternatives discussed with patient including GI upset, sedation, dizziness with falls risk, increased appetite.  Do not use before operating vehicle, vessel, or machine. After discussion of these risks and benefits, the patient voiced understanding and agreed to proceed.  INCREASE abilify 4 to 5 mg qhs. Risks, benefits, alternatives discussed with patient including increased energy, exacerbation of irritability, akathisia, movement issues, GI upset, orthostatic hypotension, increased appetite, tardive dyskinesia.  Use care when  operating vehicle, vessel, or machine. After discussion of these risks and benefits, the patient voiced understanding and agreed to proceed.  CONTINUE venlafaxine 150 mg PO QDAY. Risks, benefits, alternatives discussed with patient including GI upset, nausea vomiting diarrhea, theoretical decrease of seizure threshold predisposing the patient to a slightly higher seizure risk, headaches, sexual dysfunction, serotonin syndrome, bleeding risk, increased suicidality in patients 24 years and younger.  After discussion of these risks and benefits, the patient voiced understanding and agreed to proceed.  CONTINUE gabapentin 300 twice daily. Risks, benefits, alternatives discussed with patient including sedation, dizziness/falls risk, GI upset, weight gain.  After discussion of these risks and benefits, the patient voiced understanding and agreed to proceed. UDS ordered, Ernestina reviewed.  S/P:  quetiapine 50 mg nightly, 25 mg q2pm. Prolonged qtc 7/24.  BuSpar 10 mg at 3 PM daily. Ineffective.  sertraline 100 mg daily.  Not effective.  Therapy: Deferred.  Other: s/p referral to neurology for workup of likely dementia.      TREATMENT PLAN/GOALS: Continue supportive psychotherapy efforts and medications as indicated. Treatment and medication options discussed during today's visit. Patient ackowledged and verbally consented to continue with current treatment plan and was educated on the importance of compliance with treatment and follow-up appointments.    MEDICATION ISSUES:  ERNESTINA reviewed as expected.  Discussed medication options and treatment plan of prescribed medication as well as the risks, benefits, and side effects including potential falls, possible impaired driving and metabolic adversities among others. Patient is agreeable to call the office with any worsening of symptoms or onset of side effects. Patient is agreeable to call 911 or go to the nearest ER should he/she begin having SI/HI. No medication side effects  or related complaints today.     MEDS ORDERED DURING VISIT:  New Medications Ordered This Visit   Medications    mirtazapine (REMERON) 15 MG tablet     Sig: Take 1.5 tablets by mouth Every Night.     Dispense:  135 tablet     Refill:  1     Increasing dose from 7.5 to 22.5 mg    ARIPiprazole (Abilify) 5 MG tablet     Sig: Take 1 tablet by mouth Daily.     Dispense:  90 tablet     Refill:  1     Replaces 2 mg dose.       Return in about 4 weeks (around 9/24/2024) for Video visit.         This document has been electronically signed by Rkii Peterson MD  August 27, 2024 17:30 EDT      Part of this note may be an electronic transcription/translation of spoken language to printed text using the Dragon Dictation System.

## 2024-08-27 NOTE — PLAN OF CARE
Rogerian psychotherapy to help manage depression and anxiety related to family conflict, medical conditions, hallucinations

## 2024-08-27 NOTE — TREATMENT PLAN
Multi-Disciplinary Problems (from Behavioral Health Treatment Plan)      Active Problems       Problem: Anxiety  Start Date: 08/27/24      Problem Details: The patient self-scales this problem as a 3 with 10 being the worst.    family conflict, medical conditions, hallucinations        Goal Priority Start Date Expected End Date End Date    Patient will develop and implement behavioral and cognitive strategies to reduce anxiety and irrational fears. -- 08/27/24 02/25/25 --    Goal Details: Progress toward goal:  The patient self-scales their progress related to this goal as a 5 with 10 being the worst.          Goal Intervention Frequency Start Date End Date    Help patient explore past emotional issues in relation to present anxiety. Q Month 08/27/24 --    Intervention Details: Duration of treatment until remission of symptoms.          Goal Intervention Frequency Start Date End Date    Help patient develop an awareness of their cognitive and physical responses to anxiety. Q Month 08/27/24 --    Intervention Details: Duration of treatment until remission of symptoms.                  Problem: Depression  Start Date: 08/27/24      Problem Details: The patient self-scales this problem as a 4 with 10 being the worst.  family conflict, medical conditions, hallucinations        Goal Priority Start Date Expected End Date End Date    Patient will demonstrate the ability to initiate new constructive life skills outside of sessions on a consistent basis. -- 08/27/24 02/25/25 --    Goal Details: Progress toward goal:  The patient self-scales their progress related to this goal as a 5 with 10 being the worst.          Goal Intervention Frequency Start Date End Date    Assist patient in setting attainable activities of daily living goals. PRN 08/27/24 --      Goal Intervention Frequency Start Date End Date    Provide education about depression Q Month 08/27/24 --    Intervention Details: Duration of treatment until remission of  symptoms.          Goal Intervention Frequency Start Date End Date    Assist patient in developing healthy coping strategies. Q Month 08/27/24 --    Intervention Details: Duration of treatment until remission of symptoms.                          Reviewed By       Riki Peterson MD 08/27/24 2313                     I have discussed and reviewed this treatment plan with the patient.

## 2024-08-30 RX ORDER — OMEPRAZOLE 40 MG/1
40 CAPSULE, DELAYED RELEASE ORAL DAILY
Qty: 90 CAPSULE | Refills: 1 | Status: SHIPPED | OUTPATIENT
Start: 2024-08-30

## 2024-08-30 NOTE — TELEPHONE ENCOUNTER
Caller: Meagan Morrison    Relationship: Emergency Contact    Best call back number: [phone]     Requested Prescriptions:   Requested Prescriptions     Pending Prescriptions Disp Refills    omeprazole (priLOSEC) 40 MG capsule 90 capsule 1     Sig: Take 1 capsule by mouth Daily.        Pharmacy where request should be sent: [facility]ARMACY #[id number] - [place], [place] - [address] [facility] - [phone]  - [phone] FX     Last office visit with prescribing clinician: 9/28/2023   Last telemedicine visit with prescribing clinician: Visit date not found   Next office visit with prescribing clinician: 9/9/2024       Does the patient have less than a 3 day supply:  [x] Yes  [] No    Would you like a call back once the refill request has been completed: [] Yes [x] No    If the office needs to give you a call back, can they leave a voicemail: [] Yes [x] No    Erin Rizzo, PCT   08/30/24 10:49 EDT

## 2024-09-03 RX ORDER — ATORVASTATIN CALCIUM 40 MG/1
40 TABLET, FILM COATED ORAL DAILY
Qty: 90 TABLET | Refills: 1 | Status: SHIPPED | OUTPATIENT
Start: 2024-09-03

## 2024-09-09 ENCOUNTER — OFFICE VISIT (OUTPATIENT)
Dept: INTERNAL MEDICINE | Facility: CLINIC | Age: 74
End: 2024-09-09
Payer: MEDICARE

## 2024-09-09 VITALS
DIASTOLIC BLOOD PRESSURE: 88 MMHG | HEIGHT: 63 IN | WEIGHT: 168.2 LBS | TEMPERATURE: 97.4 F | BODY MASS INDEX: 29.8 KG/M2 | HEART RATE: 70 BPM | SYSTOLIC BLOOD PRESSURE: 138 MMHG | OXYGEN SATURATION: 95 %

## 2024-09-09 DIAGNOSIS — Z12.11 SCREEN FOR COLON CANCER: Primary | ICD-10-CM

## 2024-09-09 DIAGNOSIS — I10 PRIMARY HYPERTENSION: ICD-10-CM

## 2024-09-09 DIAGNOSIS — E03.9 ACQUIRED HYPOTHYROIDISM: ICD-10-CM

## 2024-09-09 DIAGNOSIS — E87.5 HYPERKALEMIA: ICD-10-CM

## 2024-09-09 DIAGNOSIS — E78.2 MIXED HYPERLIPIDEMIA: ICD-10-CM

## 2024-09-09 LAB
ALBUMIN SERPL-MCNC: 3.8 G/DL (ref 3.5–5.2)
ALBUMIN/GLOB SERPL: 1.2 G/DL
ALP SERPL-CCNC: 122 U/L (ref 39–117)
ALT SERPL W P-5'-P-CCNC: 11 U/L (ref 1–33)
ANION GAP SERPL CALCULATED.3IONS-SCNC: 9 MMOL/L (ref 5–15)
AST SERPL-CCNC: 5 U/L (ref 1–32)
BASOPHILS # BLD AUTO: 0.04 10*3/MM3 (ref 0–0.2)
BASOPHILS NFR BLD AUTO: 0.7 % (ref 0–1.5)
BILIRUB SERPL-MCNC: 0.2 MG/DL (ref 0–1.2)
BUN SERPL-MCNC: 24 MG/DL (ref 8–23)
BUN/CREAT SERPL: 22.4 (ref 7–25)
CALCIUM SPEC-SCNC: 9.3 MG/DL (ref 8.6–10.5)
CHLORIDE SERPL-SCNC: 103 MMOL/L (ref 98–107)
CHOLEST SERPL-MCNC: 152 MG/DL (ref 0–200)
CO2 SERPL-SCNC: 29 MMOL/L (ref 22–29)
CREAT SERPL-MCNC: 1.07 MG/DL (ref 0.57–1)
DEPRECATED RDW RBC AUTO: 43.9 FL (ref 37–54)
EGFRCR SERPLBLD CKD-EPI 2021: 54.3 ML/MIN/1.73
EOSINOPHIL # BLD AUTO: 0.13 10*3/MM3 (ref 0–0.4)
EOSINOPHIL NFR BLD AUTO: 2.2 % (ref 0.3–6.2)
ERYTHROCYTE [DISTWIDTH] IN BLOOD BY AUTOMATED COUNT: 13.8 % (ref 12.3–15.4)
GLOBULIN UR ELPH-MCNC: 3.3 GM/DL
GLUCOSE SERPL-MCNC: 78 MG/DL (ref 65–99)
HCT VFR BLD AUTO: 37.7 % (ref 34–46.6)
HDLC SERPL-MCNC: 48 MG/DL (ref 40–60)
HGB BLD-MCNC: 12.4 G/DL (ref 12–15.9)
IMM GRANULOCYTES # BLD AUTO: 0.02 10*3/MM3 (ref 0–0.05)
IMM GRANULOCYTES NFR BLD AUTO: 0.3 % (ref 0–0.5)
LDLC SERPL CALC-MCNC: 73 MG/DL (ref 0–100)
LDLC/HDLC SERPL: 1.4 {RATIO}
LYMPHOCYTES # BLD AUTO: 1 10*3/MM3 (ref 0.7–3.1)
LYMPHOCYTES NFR BLD AUTO: 16.8 % (ref 19.6–45.3)
MCH RBC QN AUTO: 28.7 PG (ref 26.6–33)
MCHC RBC AUTO-ENTMCNC: 32.9 G/DL (ref 31.5–35.7)
MCV RBC AUTO: 87.3 FL (ref 79–97)
MONOCYTES # BLD AUTO: 0.41 10*3/MM3 (ref 0.1–0.9)
MONOCYTES NFR BLD AUTO: 6.9 % (ref 5–12)
NEUTROPHILS NFR BLD AUTO: 4.34 10*3/MM3 (ref 1.7–7)
NEUTROPHILS NFR BLD AUTO: 73.1 % (ref 42.7–76)
NRBC BLD AUTO-RTO: 0 /100 WBC (ref 0–0.2)
PLATELET # BLD AUTO: 316 10*3/MM3 (ref 140–450)
PMV BLD AUTO: 10.6 FL (ref 6–12)
POTASSIUM SERPL-SCNC: 5.5 MMOL/L (ref 3.5–5.2)
PROT SERPL-MCNC: 7.1 G/DL (ref 6–8.5)
RBC # BLD AUTO: 4.32 10*6/MM3 (ref 3.77–5.28)
SODIUM SERPL-SCNC: 141 MMOL/L (ref 136–145)
TRIGL SERPL-MCNC: 185 MG/DL (ref 0–150)
TSH SERPL DL<=0.05 MIU/L-ACNC: 1.03 UIU/ML (ref 0.27–4.2)
VLDLC SERPL-MCNC: 31 MG/DL (ref 5–40)
WBC NRBC COR # BLD AUTO: 5.94 10*3/MM3 (ref 3.4–10.8)

## 2024-09-09 PROCEDURE — 3079F DIAST BP 80-89 MM HG: CPT | Performed by: NURSE PRACTITIONER

## 2024-09-09 PROCEDURE — 1159F MED LIST DOCD IN RCRD: CPT | Performed by: NURSE PRACTITIONER

## 2024-09-09 PROCEDURE — 3075F SYST BP GE 130 - 139MM HG: CPT | Performed by: NURSE PRACTITIONER

## 2024-09-09 PROCEDURE — 80053 COMPREHEN METABOLIC PANEL: CPT | Performed by: NURSE PRACTITIONER

## 2024-09-09 PROCEDURE — G2211 COMPLEX E/M VISIT ADD ON: HCPCS | Performed by: NURSE PRACTITIONER

## 2024-09-09 PROCEDURE — 85025 COMPLETE CBC W/AUTO DIFF WBC: CPT | Performed by: NURSE PRACTITIONER

## 2024-09-09 PROCEDURE — 1160F RVW MEDS BY RX/DR IN RCRD: CPT | Performed by: NURSE PRACTITIONER

## 2024-09-09 PROCEDURE — 99214 OFFICE O/P EST MOD 30 MIN: CPT | Performed by: NURSE PRACTITIONER

## 2024-09-09 PROCEDURE — 80061 LIPID PANEL: CPT | Performed by: NURSE PRACTITIONER

## 2024-09-09 PROCEDURE — 84443 ASSAY THYROID STIM HORMONE: CPT | Performed by: NURSE PRACTITIONER

## 2024-09-09 NOTE — PROGRESS NOTES
"Chief Complaint  Hypertension (F/u), Hyperlipidemia (F/u), and Colon Cancer Screening (Colon cancer screening)    Subjective          Payal Singer presents to [facility]  History of Present Illness  HTN-elevated today  Does not check at home  Denies chest pain, dizziness, headache  Moved in with her daughter    HLD-no leg cramps   now at rehab at [facility]    No family hx colon cancer  Objective   Vital Signs:   /88 (BP Location: Right arm, Patient Position: Sitting, Cuff Size: Adult)   Pulse 70   Temp 97.4 °F (36.3 °C) (Temporal)   Ht 160 cm (62.99\")   Wt 76.3 kg (168 lb 3.2 oz)   SpO2 95%   BMI 29.80 kg/m²     Physical Exam  Vitals and nursing note reviewed.   Constitutional:       General: She is not in acute distress.     Appearance: Normal appearance.   HENT:      Head: Normocephalic and atraumatic.      Right Ear: External ear normal.      Left Ear: External ear normal.      Nose: Nose normal.      Mouth/Throat:      Mouth: Mucous membranes are moist.   Eyes:      Conjunctiva/sclera: Conjunctivae normal.   Cardiovascular:      Rate and Rhythm: Normal rate and regular rhythm.      Pulses: Normal pulses.      Heart sounds: Normal heart sounds. No murmur heard.     No friction rub. No gallop.   Pulmonary:      Effort: Pulmonary effort is normal. No respiratory distress.      Breath sounds: No wheezing, rhonchi or rales.   Musculoskeletal:      Cervical back: Neck supple.      Right lower leg: No edema.      Left lower leg: No edema.   Skin:     General: Skin is warm and dry.   Neurological:      General: No focal deficit present.      Mental Status: She is alert and oriented to person, place, and time.   Psychiatric:         Mood and Affect: Mood normal.         Behavior: Behavior normal.        Result Review :          Procedures      Assessment and Plan    Diagnoses and all orders for this visit:    1. Screen for colon cancer (Primary)  -     " Cologuard - Stool, Per Rectum; Future    2. Mixed hyperlipidemia  Comments:  We will check labs in the office today.  Will review and adjust medications as needed  Orders:  -     CBC & Differential    3. Acquired hypothyroidism  Comments:  We will check labs in the office today.  Will review and adjust medications as needed  Orders:  -     CBC & Differential  -     TSH    4. Primary hypertension  -     Comprehensive Metabolic Panel  -     CBC & Differential  -     Lipid Panel    Discussed elevated blood pressure at today's visit.  Discussed risks of blood pressure elevation including death, heart attack, stroke, chronic kidney disease, blindness.  Follow a low-salt diet and increase exercise.  Discussed importance of medication compliance and avoidance of missing doses.  Continue current meds at this time.  We will monitor at follow-up and adjust blood pressure medications if necessary.  ER with chest pain, palpitations, vision loss, unilateral weakness, or altered mental status.  Patient verbalized understanding          Follow Up   Return for Medicare Wellness after 9/28 , hypertension, flu vaccine.  Patient was given instructions and counseling regarding her condition or for health maintenance advice. Please see specific information pulled into the AVS if appropriate.

## 2024-09-23 ENCOUNTER — CLINICAL SUPPORT (OUTPATIENT)
Dept: INTERNAL MEDICINE | Facility: CLINIC | Age: 74
End: 2024-09-23
Payer: MEDICARE

## 2024-09-23 DIAGNOSIS — E03.9 ACQUIRED HYPOTHYROIDISM: Chronic | ICD-10-CM

## 2024-09-23 DIAGNOSIS — E78.2 MIXED HYPERLIPIDEMIA: Primary | Chronic | ICD-10-CM

## 2024-09-23 DIAGNOSIS — E55.9 VITAMIN D DEFICIENCY: Chronic | ICD-10-CM

## 2024-09-23 LAB
ANION GAP SERPL CALCULATED.3IONS-SCNC: 9 MMOL/L (ref 5–15)
BUN SERPL-MCNC: 22 MG/DL (ref 8–23)
BUN/CREAT SERPL: 16.7 (ref 7–25)
CALCIUM SPEC-SCNC: 8.6 MG/DL (ref 8.6–10.5)
CHLORIDE SERPL-SCNC: 104 MMOL/L (ref 98–107)
CO2 SERPL-SCNC: 28 MMOL/L (ref 22–29)
CREAT SERPL-MCNC: 1.32 MG/DL (ref 0.57–1)
EGFRCR SERPLBLD CKD-EPI 2021: 42.2 ML/MIN/1.73
GLUCOSE SERPL-MCNC: 118 MG/DL (ref 65–99)
POTASSIUM SERPL-SCNC: 4.8 MMOL/L (ref 3.5–5.2)
SODIUM SERPL-SCNC: 141 MMOL/L (ref 136–145)

## 2024-09-23 PROCEDURE — 80048 BASIC METABOLIC PNL TOTAL CA: CPT | Performed by: NURSE PRACTITIONER

## 2024-09-23 PROCEDURE — 36415 COLL VENOUS BLD VENIPUNCTURE: CPT | Performed by: NURSE PRACTITIONER

## 2024-09-23 RX ORDER — METOPROLOL TARTRATE 25 MG/1
50 TABLET, FILM COATED ORAL DAILY
Qty: 60 TABLET | Refills: 0 | Status: SHIPPED | OUTPATIENT
Start: 2024-09-23

## 2024-09-24 ENCOUNTER — TELEMEDICINE (OUTPATIENT)
Dept: PSYCHIATRY | Facility: CLINIC | Age: 74
End: 2024-09-24
Payer: MEDICARE

## 2024-09-24 DIAGNOSIS — R44.3 HALLUCINATIONS: ICD-10-CM

## 2024-09-24 DIAGNOSIS — F33.0 MILD RECURRENT MAJOR DEPRESSION: ICD-10-CM

## 2024-09-24 DIAGNOSIS — F41.1 GENERALIZED ANXIETY DISORDER: Primary | ICD-10-CM

## 2024-09-24 DIAGNOSIS — R68.89 FORGETFULNESS: ICD-10-CM

## 2024-09-24 DIAGNOSIS — F51.05 INSOMNIA DUE TO MENTAL CONDITION: ICD-10-CM

## 2024-09-24 DIAGNOSIS — M79.2 NEUROPATHIC PAIN: ICD-10-CM

## 2024-09-24 PROCEDURE — 1160F RVW MEDS BY RX/DR IN RCRD: CPT | Performed by: STUDENT IN AN ORGANIZED HEALTH CARE EDUCATION/TRAINING PROGRAM

## 2024-09-24 PROCEDURE — 90833 PSYTX W PT W E/M 30 MIN: CPT | Performed by: STUDENT IN AN ORGANIZED HEALTH CARE EDUCATION/TRAINING PROGRAM

## 2024-09-24 PROCEDURE — 99214 OFFICE O/P EST MOD 30 MIN: CPT | Performed by: STUDENT IN AN ORGANIZED HEALTH CARE EDUCATION/TRAINING PROGRAM

## 2024-09-24 PROCEDURE — 1159F MED LIST DOCD IN RCRD: CPT | Performed by: STUDENT IN AN ORGANIZED HEALTH CARE EDUCATION/TRAINING PROGRAM

## 2024-09-24 RX ORDER — QUETIAPINE FUMARATE 25 MG/1
TABLET, FILM COATED ORAL
Qty: 90 TABLET | Refills: 5 | Status: SHIPPED | OUTPATIENT
Start: 2024-09-24

## 2024-09-24 RX ORDER — VENLAFAXINE HYDROCHLORIDE 150 MG/1
150 CAPSULE, EXTENDED RELEASE ORAL DAILY
Qty: 90 CAPSULE | Refills: 3 | Status: SHIPPED | OUTPATIENT
Start: 2024-09-24

## 2024-10-03 ENCOUNTER — TELEMEDICINE (OUTPATIENT)
Dept: INTERNAL MEDICINE | Facility: CLINIC | Age: 74
End: 2024-10-03
Payer: MEDICARE

## 2024-10-03 DIAGNOSIS — Z78.0 POST-MENOPAUSAL: ICD-10-CM

## 2024-10-03 DIAGNOSIS — Z00.00 ENCOUNTER FOR ANNUAL WELLNESS EXAM IN MEDICARE PATIENT: Primary | ICD-10-CM

## 2024-10-03 DIAGNOSIS — Z12.31 SCREENING MAMMOGRAM FOR BREAST CANCER: ICD-10-CM

## 2024-10-03 PROCEDURE — G0439 PPPS, SUBSEQ VISIT: HCPCS | Performed by: NURSE PRACTITIONER

## 2024-10-03 PROCEDURE — 1170F FXNL STATUS ASSESSED: CPT | Performed by: NURSE PRACTITIONER

## 2024-10-03 NOTE — PROGRESS NOTES
Subjective   The ABCs of the Annual Wellness Visit  Medicare Wellness Visit  Mode of Visit: Video  Location of patient: Home  Location of provider: [facility]  You have chosen to receive care through a telehealth visit.  The patient has signed the video visit consent form.  The visit included audio and video interaction. No technical issues occurred during this visit.      Payal Singer is a 74 y.o. patient who presents for a Medicare Wellness Visit.    The following portions of the patient's history were reviewed and   updated as appropriate: allergies, current medications, past family history, past medical history, past social history, past surgical history, and problem list.    Compared to one year ago, the patient's physical   health is the same.  Compared to one year ago, the patient's mental   health is the same.    Recent Hospitalizations:  She was not admitted to the hospital during the last year.     Current Medical Providers:  Patient Care Team:  Alyson Marks APRN as PCP - General (Nurse Practitioner)    Outpatient Medications Prior to Visit   Medication Sig Dispense Refill    acetaminophen (TYLENOL) 500 MG tablet Take 1 tablet by mouth Every 6 (Six) Hours As Needed for Mild Pain.      atorvastatin (LIPITOR) 40 MG tablet TAKE 1 TABLET BY MOUTH EVERY DAY 90 tablet 1    gabapentin (NEURONTIN) 300 MG capsule Take 1 capsule by mouth 2 (Two) Times a Day. 60 capsule 5    levothyroxine (SYNTHROID, LEVOTHROID) 75 MCG tablet TAKE 1 TABLET BY MOUTH EVERY DAY 90 tablet 1    lisinopril (PRINIVIL,ZESTRIL) 10 MG tablet Take 1 tablet by mouth Daily. 90 tablet 1    Melatonin 10 MG tablet Take 1 tablet by mouth Every Night.      metoprolol tartrate (LOPRESSOR) 25 MG tablet TAKE 2 TABLETS BY MOUTH EVERY DAY 60 tablet 0    omeprazole (priLOSEC) 40 MG capsule Take 1 capsule by mouth Daily. 90 capsule 1    QUEtiapine (SEROquel) 25 MG tablet One tab (25 mg) at 2 pm, two tab (50 mg) nightly 90 tablet 5    venlafaxine XR  "(EFFEXOR-XR) 150 MG 24 hr capsule Take 1 capsule by mouth Daily. 90 capsule 3     No facility-administered medications prior to visit.     No opioid medication identified on active medication list. I have reviewed chart for other potential  high risk medication/s and harmful drug interactions in the elderly.      Aspirin is not on active medication list.  Aspirin use is not indicated based on review of current medical condition/s. Risk of harm outweighs potential benefits.  .    Patient Active Problem List   Diagnosis    Forgetfulness    Anxiety    Depression    Esophageal reflux    Hallucinations    Head injury    Hypertension    Other acute sinusitis    PTSD (post-traumatic stress disorder)    Mixed hyperlipidemia    Acquired hypothyroidism    Vitamin D deficiency    Neuropathic pain    Closed fracture of left distal radius and ulna     Advance Care Planning Advance Directive is not on file.  ACP discussion was held with the patient during this visit. Patient does not have an advance directive, information provided.            Objective   There were no vitals filed for this visit.    Estimated body mass index is 29.8 kg/m² as calculated from the following:    Height as of 9/9/24: 160 cm (62.99\").    Weight as of 9/9/24: 76.3 kg (168 lb 3.2 oz).            Does the patient have evidence of cognitive impairment? No  Lab Results   Component Value Date    TRIG 185 (H) 09/09/2024    HDL 48 09/09/2024    LDL 73 09/09/2024    VLDL 31 09/09/2024                                                                                               Health  Risk Assessment    Smoking Status:  Social History     Tobacco Use   Smoking Status Never   Smokeless Tobacco Never     Alcohol Consumption:  Social History     Substance and Sexual Activity   Alcohol Use Never       Fall Risk Screen  STEADI Fall Risk Assessment was completed, and patient is at LOW risk for falls.Assessment completed on:10/3/2024    Depression Screening:      " 10/3/2024     1:46 PM   PHQ-2/PHQ-9 Depression Screening   Little Interest or Pleasure in Doing Things 0-->not at all   Feeling Down, Depressed or Hopeless 3-->nearly every day   Trouble Falling or Staying Asleep, or Sleeping Too Much 3-->nearly every day   Feeling Tired or Having Little Energy 3-->nearly every day   Poor Appetite or Overeating 1-->several days   Feeling Bad about Yourself - or that You are a Failure or Have Let Yourself or Your Family Down 3-->nearly every day   Trouble Concentrating on Things, Such as Reading the Newspaper or Watching Television 1-->several days   Moving or Speaking So Slowly that Other People Could Have Noticed? Or the Opposite - Being So Fidgety 3-->nearly every day   Thoughts that You Would be Better Off Dead or of Hurting Yourself in Some Way 0-->not at all   PHQ-9: Brief Depression Severity Measure Score 17   If You Checked Off Any Problems, How Difficult Have These Problems Made It For You to Do Your Work, Take Care of Things at Home, or Get Along with Other People? not difficult at all     Health Habits and Functional and Cognitive Screening:      10/3/2024     1:00 PM   Functional & Cognitive Status   Do you have difficulty preparing food and eating? No   Do you have difficulty bathing yourself, getting dressed or grooming yourself? No   Do you have difficulty using the toilet? No   Do you have difficulty moving around from place to place? No   Do you have trouble with steps or getting out of a bed or a chair? Yes   Current Diet Limited Junk Food   Dental Exam Not up to date   Eye Exam Up to date   Exercise (times per week) 0 times per week   Current Exercises Include No Regular Exercise   Do you need help using the phone?  No   Are you deaf or do you have serious difficulty hearing?  No   Do you need help to go to places out of walking distance? Yes   Do you need help shopping? Yes   Do you need help preparing meals?  Yes   Do you need help with housework?  Yes   Do you  need help with laundry? Yes   Do you need help taking your medications? No   Do you need help managing money? Yes   Do you ever drive or ride in a car without wearing a seat belt? No   Have you felt unusual stress, anger or loneliness in the last month? Yes   Who do you live with? Child   If you need help, do you have trouble finding someone available to you? No   Have you been bothered in the last four weeks by sexual problems? No   Do you have difficulty concentrating, remembering or making decisions? Yes           Age-appropriate Screening Schedule:  Refer to the list below for future screening recommendations based on patient's age, sex and/or medical conditions. Orders for these recommended tests are listed in the plan section. The patient has been provided with a written plan.    Health Maintenance List  Health Maintenance   Topic Date Due    DXA SCAN  Never done    COLORECTAL CANCER SCREENING  Never done    TDAP/TD VACCINES (1 - Tdap) Never done    ZOSTER VACCINE (1 of 2) Never done    RSV Vaccine - Adults (1 - 1-dose 60+ series) Never done    INFLUENZA VACCINE  08/01/2024    COVID-19 Vaccine (1 - 2023-24 season) Never done    LIPID PANEL  09/09/2025    ANNUAL WELLNESS VISIT  10/03/2025    BMI FOLLOWUP  10/03/2025    HEPATITIS C SCREENING  Completed    Pneumococcal Vaccine 65+  Completed                                                                                                                                                CMS Preventative Services Quick Reference  Risk Factors Identified During Encounter  Chronic Pain: Natural history and expected course discussed. Questions answered.  Depression/Dysphoria:  seeing psych  Immunizations Discussed/Encouraged: Influenza, Shingrix, and COVID19  Inactivity/Sedentary:  increase physical activity as tolerated  Dental Screening Recommended    The above risks/problems have been discussed with the patient.  Pertinent information has been shared with the patient  in the After Visit Summary.  An After Visit Summary and PPPS were made available to the patient.    Follow Up:   Next Medicare Wellness visit to be scheduled in 1 year.     Assessment & Plan  Encounter for annual wellness exam in Medicare patient    Post-menopausal    Screening mammogram for breast cancer      Orders Placed This Encounter   Procedures    DEXA Bone Density Axial    Mammo Screening Digital Tomosynthesis Bilateral With CAD             Follow Up:   Return in about 6 months (around 4/3/2025).

## 2024-10-15 ENCOUNTER — TELEPHONE (OUTPATIENT)
Dept: PSYCHIATRY | Facility: CLINIC | Age: 74
End: 2024-10-15
Payer: MEDICARE

## 2024-10-15 NOTE — TELEPHONE ENCOUNTER
PT'S  IS NOT DOING WELL AND HAS BEEN TRANSFERRED TO COMFORT CARE.  DAUGHTER CALLED WANTING TO SEE IF DOCTOR ROSSY COULD SPEAK WITH HER MOTHER TO HELP HE WITH DIGESTING THIS INFORMATION.    I EXPLAINED THAT THE DOCTOR IS CURRENTLY OUT OF THE COUNTRY, UNAVAILABLE TO TAKE CALLS. I ASKED IF THE DAUGHTER WOULD LIKE TH MOTHER TO SPEAK WITH A COVERING PROVIDER.    THE DAUGHTER ASKED THAT WE MAKE A NOTE FOR DOCTOR BLAIR LETTING HIM KNOW WHAT IS GOING ON.   DAUGHTER STATED THAT IF THINGS GET REALLY BAD SHE WILL CALL BACK TO SPEAK WITH AN ON CALL PROVIDER

## 2024-10-21 RX ORDER — METOPROLOL TARTRATE 25 MG/1
50 TABLET, FILM COATED ORAL DAILY
Qty: 60 TABLET | Refills: 0 | Status: SHIPPED | OUTPATIENT
Start: 2024-10-21

## 2024-10-22 ENCOUNTER — DOCUMENTATION (OUTPATIENT)
Dept: PSYCHIATRY | Facility: CLINIC | Age: 74
End: 2024-10-22
Payer: MEDICARE

## 2024-10-24 ENCOUNTER — EXTERNAL PBMM DATA (OUTPATIENT)
Dept: PHARMACY | Facility: OTHER | Age: 74
End: 2024-10-24
Payer: MEDICARE

## 2024-10-29 ENCOUNTER — POP HEALTH PHARMACY (OUTPATIENT)
Dept: PHARMACY | Facility: OTHER | Age: 74
End: 2024-10-29
Payer: MEDICARE

## 2024-10-29 NOTE — PROGRESS NOTES
[facility] Outreach      Payal Singer was called today to discuss medication adherence with ATORVASTATIN CALCIUM ([facility] COA REDUCTASE INHIBITORS)  LISINOPRIL (ACE INHIBITORS) , as she was identified as having care opportunities.    Program Details    [facility]  Status: Enrolled  Effective Dates: 10/29/2024 - present  Responsible Staff: Sherly Snell LPN          Opportunities Identified    Adherence- Hypertension       Adherence and Medication Management    Adherence Questions   Patient Reported X Missed Doses in the Last 7 Days : 0  Reasons for Non-Adherence : Other - see comments  List other reason(s) for non-adherence or missed doses: previous insurance gave an abundance of supply  Interested in a 90 day supply? : Already receiving  Does this require clinical escalation to a pharmacist?: N         General Medication Management    Type of medication management: targeted medication review  Review Completed on: 10/29/24  Recipient: other authorized individual  Visit type: initial  Method of contact: by telephone           Medication Therapy Problems     Medication Therapy Recommendations  No medication therapy recommendations to display      Summary    Medication Management Summary    Topics discussed: adherence and missed doses discussed  Time spent: 46 - 60 min    Introduced self, explained Pharmacy Outreach role and provided contact information. Verified patient name and date of birth. Spoke with patient about medication adherence. Then discussed previous insurance gave an abundance of supply. No other needs at this time         Sherly Snell LPN, 10/29/24, 4:27 PM EDT.

## 2024-10-31 NOTE — PATIENT INSTRUCTIONS
1.  Please return to clinic at your next scheduled visit.  Contact the clinic ([phone]) at least 24 hours prior in the event you need to cancel.  2.  Do no harm to yourself or others.    3.  Avoid alcohol and drugs.    4.  Take all medications as prescribed.  Please contact the clinic with any concerns. If you are in need of medication refills, please call the clinic at [phone].    5. Should you want to get in touch with your provider, Dr. Riki Peterson, please utilize [employer]Connect or contact the office ([phone]), and staff will be able to page Dr. Peterson directly.  6.  In the event you have personal crisis, contact the following crisis numbers: Suicide Prevention Hotline 1-504.771.8332; TATE Helpline 9-964-402-TATE; [facility] Emergency Room [phone]; text HELLO to 854009; or 995.

## 2024-10-31 NOTE — PROGRESS NOTES
Subjective   Payal Singer is a 74 y.o. female who presents today for follow up    Chief Complaint:  mdd meagan    History of Present Illness:     Initial Chart review 4/15/21: Seen 3/30 to establish care. Hx of anx/dep avh in [place]. on both sertraline and venlafaxine, was on Seroquel. Hx of HTN, GERD. On gabapentin 600 tid, sertraline 200 d, venla 150 d, quetiap 25 qhs. 2021 labs: cbc shows low mch, high rdw; Cr 1.15 high, abnormal lipids, TSH and fT4 wnl. No OP head imaging, ekg.     Chart review:   2024: int med  2024: int med x2, reassuring cbc, TSH. Abnl cmp cr 1.07, ap 122, abnl lipids.  2024: no visits. Started on mirtazapine, doubled abilify.  24: EKG qtc 489.  24: no visits.  24: no visits.  : UC frx of left ulna, ortho x3,   Int med, reassuring rpr/b12/vit D/hcv/hyroid studies/cbc. Cmp: elev gluc 103, cr 1.04, ap 137.  No visits.  no new    Plannin2024: No better. Stop mirtazapine, abilify, and restart quetiapine. Need UDS, CSA.  2024: Improving, increase abilify and mirtazapine by small amounts. Some guilt confounding crying spells.  2024: Prolonged Qtc. Taper off seroquel and start rexulti. Other safe meds include abilify, olanzapine re: qtc.  2024: EKG now, if qtc reassuring, increase qhs quetiapine to 100 mg. Utilize distraction and possibly abilify to manage Ck's sundowning.   24:  now on abilify, much better in terms of aggression, but more dependent. Meagan trying to get him approved for medicaid waiver to get him into a home. Referred to Office of Dementia Services at [phone], also [facility] @ [phone]. Close follow up to discuss pt's symptoms in depth.  : Stable, well, discussed strategy to manage 's behavior. Meagan will call Dr. Rivero to inform her of his behavior, then they will call me back. Once med changes are made, plan is to put  in good spirits by giving him sugar  "free ice cream (vanilla) first, then offering meds.  10/26: Now sleeping. Discussed further strategies for , such as proactively getting him his meds every morning. Meagan is in agreement. Discuss sleep study next visit.    Visits (Below):  Discussed behavioral strategies to manage 's behavior.  Calming:  Children or friends calling on the phone  Meals available (food)  snacks    \"Payal\" and \"Meagan\" and \"Ck\" her     2024:   Interview:  \"We had been  nearly 57 years.\"  Grieved Ck. He got COVID.  Now sleeping  Was very upset in the morning when someone asked her if she was . It made her think about Ck.  Mood/Depression: MDD depressed mood improved  Grief: Ck  Anxiety: ESHA stable, situational anxiety  Panic attacks: stable  Energy: baseline low  Concentration: baseline low  Insomnia: stable  Eatin, 168, 171 lbs  Refills: y  Substances: denies  Therapy: n  Medication compliant: y  SE: n  No SI HI AVH, but has seen them recently      2024:   Virtual visit via Zoom audio and video due to the COVID-19 pandemic.  Patient is accepting of and agreeable to visit.  The visit consisted of the patient and I. Patient is at home, and I am at the office.  Interview:  \"It didn't help anything.\"  Not sleeping  Still sees spiders, but they don't bother me  Has anxiety and crying spells  Not sleeping well.   Mood/Depression: MDD depressed mood  Anxiety: ESHA still on edge  Panic attacks: stable  Energy: baseline low  Concentration: baseline low  Insomnia: stable  Eatin, 171 lbs  Refills: y  Substances: denies  Therapy: n  Medication compliant: y  SE: n  No SI HI AVH, but has seen them recently      2024:   Virtual visit via Zoom audio and video due to the COVID-19 pandemic.  Patient is accepting of and agreeable to visit.  The visit consisted of the patient and I. Patient is at home, and I am at the office.  Interview:  \"You're still crying every day.\" (Meagan to " "her Mom)  Improving hallucinations  Still some anxiety and crying spells  Now sleeping  Crying spells relate to Ck now being at [facility] (guilt)  Mood/Depression: MDD depressed mood  Anxiety: ESHA on edge  Panic attacks: stable  Energy: baseline low  Concentration: baseline low  Insomnia: stable  Eatin lbs  Refills: y  Substances: denies  Therapy: n  Medication compliant: y  SE: n  No SI HI AVH, but has seen them recently      2024: Virtual visit via Zoom audio and video due to the COVID-19 pandemic.  Patient is accepting of and agreeable to visit.  The visit consisted of the patient and I. Patient is at home, and I am at the office.  Interview:  \" We just got the EKG.\"  No change to hallucinations.  Prolonged qtc  Has not been evaluated for dementia.  Mood/Depression: stable MDD  Anxiety: ESHA on edge  Panic attacks: n  Energy: baseline low  Concentration: baseline low  Insomnia: stable  Eatin lbs  Refills: y  Substances: def  Therapy: n  Medication compliant: y  SE: n  No SI HI AVH, but has seen them recently      2024: Virtual visit via Zoom audio and video due to the COVID-19 pandemic.  Patient is accepting of and agreeable to visit.  The visit consisted of the patient and I. Patient is at home, and I am at the office.  Interview:  \"I've been having hallucinations.\"  Sees them right before dark: people coming up to me, standing next to me. No AH, only VH. Has seen spiders at night (woke her up).  Pt has insight into the fact that they are not hallucinations  At one point, it appeared we had the hallucinations under control, but pt denies this now  Has not been evaluated for dementia  Mood/Depression: stable MDD, mood  Anxiety: ESHA some feeling on edge  Panic attacks: n  Energy: baseline low  Concentration: baseline low  Insomnia: stable  Eatin lbs  Refills: y  Substances: def  Therapy: n  Medication compliant: y  SE: n  No SI HI AVH.      2024: Virtual visit via Zoom audio " "and video due to the COVID-19 pandemic.  Patient is accepting of and agreeable to visit.  The visit consisted of the patient and I. Patient is at home, and I am at the office.  Interview:  Plannin/26: Stable, well, discussed strategy to manage 's behavior. Meagan will call Dr. Rivero to inform her of his behavior, then they will call me back. Once med changes are made, plan is to put  in good spirits by giving him sugar free ice cream (vanilla) first, then offering meds.  10/26: Now sleeping. Discussed further strategies for , such as proactively getting him his meds every morning. Meagan is in agreement. Discuss sleep study next visit.  \"He keeps asking the same question again and again.\"  Aggression hasn't been bad at alI in weeks.  Meagan: he's now very dependent.  Mood/Depression: stable MDD, mood  Anxiety: stable ESHA  Panic attacks: n  Energy: baseline low  Concentration: baseline low  Insomnia: stable  Eatin lbs  Refills: y  Substances: def  Therapy: n  Medication compliant: y  SE: n  No SI HI AVH.    ...      Previous:  Still having panic attacks once a week. Sx: soa, sweating, palpitations, tachycardia, fear, no derealization/depersonalization, last 30 min, leaves room where she was at. No triggers. Fear of another panic attack happening.     Anxiety due to 's health; admitted to hospital recently for dehydration. Notes uncontrolled worrying, muscle tension, irritability, restlessness, trouble sleeping - will wake at 3 am and can't go back to sleep. Duration is at least 6 months, since moved here.    Still having hallucinations every night before she goes to bed. Sees shadows walk past bed. No AH. Sometimes sees spiders during the day on the floor; happens once or twice a week.     Seroquel helped to some extent.     Again, patient is somewhat of a poor historian. Often times she will answer a question with \"no,\" and then contradict her self moments later.    Collateral " from Meagan: patient did have a UTI (UA confirmed); was put on a course of abx. Feels her mom's confusion has improved since then.      Past Surgical History:  Past Surgical History:   Procedure Laterality Date    CATARACT EXTRACTION  2002    CHOLECYSTECTOMY OPEN  1971       Problem List:  Patient Active Problem List   Diagnosis    Forgetfulness    Anxiety    Depression    Esophageal reflux    Hallucinations    Head injury    Hypertension    Other acute sinusitis    PTSD (post-traumatic stress disorder)    Mixed hyperlipidemia    Acquired hypothyroidism    Vitamin D deficiency    Neuropathic pain    Closed fracture of left distal radius and ulna       Allergy:   Allergies   Allergen Reactions    Floxin Otic [Ofloxacin] Rash        Discontinued Medications:  Medications Discontinued During This Encounter   Medication Reason    gabapentin (NEURONTIN) 300 MG capsule Reorder                 Current Medications:   Current Outpatient Medications   Medication Sig Dispense Refill    [START ON 11/16/2024] gabapentin (NEURONTIN) 300 MG capsule Take 1 capsule by mouth 3 (Three) Times a Day. 90 capsule 5    acetaminophen (TYLENOL) 500 MG tablet Take 1 tablet by mouth Every 6 (Six) Hours As Needed for Mild Pain.      atorvastatin (LIPITOR) 40 MG tablet TAKE 1 TABLET BY MOUTH EVERY DAY 90 tablet 1    levothyroxine (SYNTHROID, LEVOTHROID) 75 MCG tablet TAKE 1 TABLET BY MOUTH EVERY DAY 90 tablet 1    lisinopril (PRINIVIL,ZESTRIL) 10 MG tablet Take 1 tablet by mouth Daily. 90 tablet 1    Melatonin 10 MG tablet Take 1 tablet by mouth Every Night.      metoprolol tartrate (LOPRESSOR) 25 MG tablet TAKE 2 TABLETS BY MOUTH EVERY DAY 60 tablet 0    omeprazole (priLOSEC) 40 MG capsule Take 1 capsule by mouth Daily. 90 capsule 1    QUEtiapine (SEROquel) 25 MG tablet One tab (25 mg) at 2 pm, two tab (50 mg) nightly 90 tablet 5    venlafaxine XR (EFFEXOR-XR) 150 MG 24 hr capsule Take 1 capsule by mouth Daily. 90 capsule 3     No current  "facility-administered medications for this visit.       Past Medical History:  Past Medical History:   Diagnosis Date    Acid reflux     Allergies     Anxiety     Asthma     Cataract     Depression     Forgetfulness     Hallucinations 04/15/2021    Head injury     HTN (hypertension)     PTSD (post-traumatic stress disorder)          Social History     Socioeconomic History    Marital status:    Tobacco Use    Smoking status: Never    Smokeless tobacco: Never   Vaping Use    Vaping status: Never Used   Substance and Sexual Activity    Alcohol use: Never    Drug use: Never    Sexual activity: Not Currently     Partners: Male         Family History   Problem Relation Age of Onset    Stroke Mother     Heart disease Mother     Stroke Father     Heart disease Father     Diabetes Father     Stroke Sister     Diabetes Sister     Stroke Brother     Diabetes Brother     Diabetes Other         AUNT/UNCLE    Anxiety disorder Other        Mental Status Exam:   Hygiene:   good, at home  Cooperation:  Cooperative  Eye Contact:  Good  Psychomotor Behavior:  Appropriate  Affect:  euthymic, mood congruent, good variability  Mood: \"not really any better\"  Speech:  Normal  Thought Process:  Goal directed  Thought Content:  Normal and Mood congruent  Suicidal:  None  Homicidal:  None  Hallucinations:  None  Delusion:  None  Memory:  Deficits  Orientation:  Grossly intact  Reliability:  fair  Insight:  Fair  Judgement:  Fair  Impulse Control:  Fair  Physical/Medical Issues:  No      Review of Systems:  Review of Systems   Constitutional:  Positive for fatigue.   Respiratory:  Positive for cough.    Endocrine: Negative for cold intolerance and heat intolerance.   Allergic/Immunologic: Negative for immunocompromised state.   Neurological:  Positive for light-headedness.   Psychiatric/Behavioral:  Positive for sleep disturbance.          Physical Exam:  Physical Exam    Vital Signs:   BP (!) 181/76   Pulse 71   Ht 160 cm (62.99\")  "  Wt 79.8 kg (176 lb)   SpO2 94%   BMI 31.19 kg/m²      Lab Results:   Office Visit on 09/09/2024   Component Date Value Ref Range Status    Glucose 09/09/2024 78  65 - 99 mg/dL Final    BUN 09/09/2024 24 (H)  8 - 23 mg/dL Final    Creatinine 09/09/2024 1.07 (H)  0.57 - 1.00 mg/dL Final    Sodium 09/09/2024 141  136 - 145 mmol/L Final    Potassium 09/09/2024 5.5 (H)  3.5 - 5.2 mmol/L Final    Chloride 09/09/2024 103  98 - 107 mmol/L Final    CO2 09/09/2024 29.0  22.0 - 29.0 mmol/L Final    Calcium 09/09/2024 9.3  8.6 - 10.5 mg/dL Final    Total Protein 09/09/2024 7.1  6.0 - 8.5 g/dL Final    Albumin 09/09/2024 3.8  3.5 - 5.2 g/dL Final    ALT (SGPT) 09/09/2024 11  1 - 33 U/L Final    AST (SGOT) 09/09/2024 5  1 - 32 U/L Final    Alkaline Phosphatase 09/09/2024 122 (H)  39 - 117 U/L Final    Total Bilirubin 09/09/2024 0.2  0.0 - 1.2 mg/dL Final    Globulin 09/09/2024 3.3  gm/dL Final    A/G Ratio 09/09/2024 1.2  g/dL Final    BUN/Creatinine Ratio 09/09/2024 22.4  7.0 - 25.0 Final    Anion Gap 09/09/2024 9.0  5.0 - 15.0 mmol/L Final    eGFR 09/09/2024 54.3 (L)  >60.0 mL/min/1.73 Final    TSH 09/09/2024 1.030  0.270 - 4.200 uIU/mL Final    Total Cholesterol 09/09/2024 152  0 - 200 mg/dL Final    Triglycerides 09/09/2024 185 (H)  0 - 150 mg/dL Final    HDL Cholesterol 09/09/2024 48  40 - 60 mg/dL Final    LDL Cholesterol  09/09/2024 73  0 - 100 mg/dL Final    VLDL Cholesterol 09/09/2024 31  5 - 40 mg/dL Final    LDL/HDL Ratio 09/09/2024 1.40   Final    WBC 09/09/2024 5.94  3.40 - 10.80 10*3/mm3 Final    RBC 09/09/2024 4.32  3.77 - 5.28 10*6/mm3 Final    Hemoglobin 09/09/2024 12.4  12.0 - 15.9 g/dL Final    Hematocrit 09/09/2024 37.7  34.0 - 46.6 % Final    MCV 09/09/2024 87.3  79.0 - 97.0 fL Final    MCH 09/09/2024 28.7  26.6 - 33.0 pg Final    MCHC 09/09/2024 32.9  31.5 - 35.7 g/dL Final    RDW 09/09/2024 13.8  12.3 - 15.4 % Final    RDW-SD 09/09/2024 43.9  37.0 - 54.0 fl Final    MPV 09/09/2024 10.6  6.0 - 12.0 fL  Final    Platelets 09/09/2024 316  140 - 450 10*3/mm3 Final    Neutrophil % 09/09/2024 73.1  42.7 - 76.0 % Final    Lymphocyte % 09/09/2024 16.8 (L)  19.6 - 45.3 % Final    Monocyte % 09/09/2024 6.9  5.0 - 12.0 % Final    Eosinophil % 09/09/2024 2.2  0.3 - 6.2 % Final    Basophil % 09/09/2024 0.7  0.0 - 1.5 % Final    Immature Grans % 09/09/2024 0.3  0.0 - 0.5 % Final    Neutrophils, Absolute 09/09/2024 4.34  1.70 - 7.00 10*3/mm3 Final    Lymphocytes, Absolute 09/09/2024 1.00  0.70 - 3.10 10*3/mm3 Final    Monocytes, Absolute 09/09/2024 0.41  0.10 - 0.90 10*3/mm3 Final    Eosinophils, Absolute 09/09/2024 0.13  0.00 - 0.40 10*3/mm3 Final    Basophils, Absolute 09/09/2024 0.04  0.00 - 0.20 10*3/mm3 Final    Immature Grans, Absolute 09/09/2024 0.02  0.00 - 0.05 10*3/mm3 Final    nRBC 09/09/2024 0.0  0.0 - 0.2 /100 WBC Final    Glucose 09/23/2024 118 (H)  65 - 99 mg/dL Final    BUN 09/23/2024 22  8 - 23 mg/dL Final    Creatinine 09/23/2024 1.32 (H)  0.57 - 1.00 mg/dL Final    Sodium 09/23/2024 141  136 - 145 mmol/L Final    Potassium 09/23/2024 4.8  3.5 - 5.2 mmol/L Final    Chloride 09/23/2024 104  98 - 107 mmol/L Final    CO2 09/23/2024 28.0  22.0 - 29.0 mmol/L Final    Calcium 09/23/2024 8.6  8.6 - 10.5 mg/dL Final    BUN/Creatinine Ratio 09/23/2024 16.7  7.0 - 25.0 Final    Anion Gap 09/23/2024 9.0  5.0 - 15.0 mmol/L Final    eGFR 09/23/2024 42.2 (L)  >60.0 mL/min/1.73 Final   Hospital Outpatient Visit on 06/28/2024   Component Date Value Ref Range Status    QT Interval 06/28/2024 442  ms Final    QTC Interval 06/28/2024 489  ms Final       EKG Results:  No orders to display       Imaging Results:  No Images in the past 120 days found..      Assessment & Plan   Diagnoses and all orders for this visit:    1. Bereavement (Primary)    2. Generalized anxiety disorder    3. Insomnia due to mental condition    4. Hallucinations    5. Forgetfulness    6. Neuropathic pain  -     gabapentin (NEURONTIN) 300 MG capsule; Take 1  capsule by mouth 3 (Three) Times a Day.  Dispense: 90 capsule; Refill: 5    7. Recurrent major depressive disorder in remission    8. Neuropathic pain  Comments:  of scalp, chronic. will restart gabapentin--UDS, controlled substance agreement, and Mehdi reviewed today.  Orders:  -     gabapentin (NEURONTIN) 300 MG capsule; Take 1 capsule by mouth 3 (Three) Times a Day.  Dispense: 90 capsule; Refill: 5      Presentation most consistent with developing dementia, likely Lewy body dementia.  Patient also reports worsening gait abnormalities; she fell in the summer of last year, injuring her head, and requiring stitches.  Patient also has a history of depression, possible PTSD by review of chart.  Patient did not mention PTSD during the interview, but she is not a very good historian.  Often times she will say no to a question, and then contradict herself only a few moments later.  She has some trouble with knowing her medications or her medical conditions.    11/01/2024:    Allowed patient to freely discuss and process issues, such as:  Anxiety and depression regarding family conflict/relationships.  Anxiety and depression regarding family's well-being.  ... using Rogerian psychotherapeutic techniques including unconditional positive regard, reflective listening, and demonstrating clear empathy, with the goal of ameliorating symptoms and maintaining, restoring, or improving self-esteem, adaptive skills, and ego or psychological functions (Carmelina et al. 1991), the long-term goal of which is to develop a better, healthier perspective and help the patient bear their circumstances more easily.  Time (minutes) spent providing supportive psychotherapy: 16  (This time is exclusive to the therapy session and separate from the time spent on activities used to meet the criteria for the E/M service (history, exam, medical decision-making).)  Start: 9:51  Stop: 10:07  Functional status: mild impairment  Treatment plan: Medication  management and supportive psychotherapy  Prognosis: good  Progress: no better  4w    09/24/2024: No better. Stop mirtazapine, abilify, and restart quetiapine. Need UDS, CSA.    08/27/2024: Improving, increase abilify and mirtazapine by small amounts. Some guilt confounding crying spells.    07/18/2024: Prolonged Qtc. Taper off seroquel and start rexulti. Other safe meds include abilify, olanzapine re: qtc.    06/14/2024: EKG now, if qtc reassuring, increase qhs quetiapine to 100 mg. Utilize distraction and possibly abilify to manage Ck's sundowning.     5/24/24:  now on abilify, much better in terms of aggression, but more dependent. Meagan trying to get him approved for medicaid waiver to get him into a home. Referred to Office of Dementia Services at [phone], also [facility] @ [phone]. Close follow up to discuss pt's symptoms in depth.    2/26: Stable, well, discussed strategy to manage 's behavior. Meagan will call Dr. Rivero to inform her of his behavior, then they will call me back. Once med changes are made, plan is to put  in good spirits by giving him sugar free ice cream (vanilla) first, then offering meds.    10/26: Now sleeping. Discussed further strategies for , such as proactively getting him his meds every morning. Meagan is in agreement. Discuss sleep study next visit.    9/27: Trouble snoring, maintenance insomnia. Sleep study. Start melatonin. Fear of spiders getting on the bed, but no AVH (but history of them).    7/25: Behavioral strategies have helped patient. Patient still not getting out of the house, agrees to try at least 1x/wk. Stable, nearly at goal.     6/20: Stable, well. Discussed behavioral strategies to manage 's behavior.     5/1: Stable. Couldn't figure out Twilio. Short visit. See back in person in 6 wks to discuss strategies for mx behavioral concerns.     1/31: Seroquel dose in the afternoon helps, but the real problem  is a situational stressor that cannot be solved with the medication.  We brainstormed possible ideas regarding how to manage patient's 's behavior.  We discussed having him accompany patient to her appointments to develop familiarity with me in order for me to potentially take over her medications.  They do not think that will help.      11/30: Buspar not helpful. Stop. Still anxious and depressed at 3 - 7 pm, strangely. Target with a dose of seroquel.  Can also try increasing frequency of gabapentin, low-dose trazodone.      8/31: Pt's anxiety comes from taking care of her father, per Meagan. He's dealing with depression. Meagan is stressed. Discussed getting more help to take care of pt and her father. Pt is better. No changes to meds.     7/19: Increase effexor to target residual anx and dep.  Consider increasing BuSpar soon at next appointment.      4/19: Patient is well and right where she should be.     3/8: Target the feeling of anxiety at 4 PM by starting BuSpar at 3 PM.  Consider switching to gabapentin if BuSpar does not help.  Would start at a low dose of 100 mg.      11/8: Depressed in the mornings. Light box, increase venlafaxine.     9/7: Doing even better.  8 weeks.  No changes.    7/27: Markedly improved, and far more linear.  Still has residual anxiety and depression.  Hallucinations are still present but they do not cause distress at all to the patient.  Continue Seroquel at the present dose.  Double the dose of venlafaxine. See back in 6 weeks.  This was also discussed with Meagan, patient's daughter.    Previous:  Caution again advised in terms of increasing the dose given the likely sensitivity she has to antipsychotics.  Psychotherapy is deferred at this time.  S/p referral to neurologist is appropriate for further management.     Visit Diagnoses:    ICD-10-CM ICD-9-CM   1. Bereavement  Z63.4 V62.82   2. Generalized anxiety disorder  F41.1 300.02   3. Insomnia due to mental condition   F51.05 300.9     327.02   4. Hallucinations  R44.3 780.1   5. Forgetfulness  R68.89 780.99   6. Neuropathic pain  M79.2 729.2   7. Recurrent major depressive disorder in remission  F33.40 296.35   8. Neuropathic pain  M79.2 729.2       PLAN:  Risk Assessment: Risk of self-harm acutely is low. Risk factors include anxiety disorder, depressive disorder, access to weapons, recent psychosocial stressors (pandemic). Protective factors include no family history, no present SI, no history of suicide attempts or self-harm in the past, no access to weapons, no AODA, healthcare seeking, future orientation, willingness to engage in care. Risk of self-harm chronically is also low, but could be further elevated in the event of treatment noncompliance and/or AODA.  Safety: No acute safety concerns.  Medications:   CONTINUE quetiapine 50 mg qhs, 25 mg q2pm. Risks, benefits, alternatives discussed with patient including nausea and vomiting, GI upset, sedation, dizziness, falls, akathisia, hypotension, increased appetite, lowering of seizure threshold, theoretical risk of tardive dyskinesia, extrapyramidal symptoms, movement issues, restless legs syndrome. Use care when operating vehicle, vessel, or machine. After discussion of these risks and benefits, the patient voiced understanding and agreed to proceed.  CONTINUE melatonin 10 mg qhs. Risks, benefits, side effects discussed with patient including sedation, dizziness/falls risk, GI upset.  Do not use before operating vehicle, vessel, or machine. After discussion of these risks and benefits, the patient voiced understanding and agreed to proceed.   CONTINUE venlafaxine 150 mg PO QDAY. Risks, benefits, alternatives discussed with patient including GI upset, nausea vomiting diarrhea, theoretical decrease of seizure threshold predisposing the patient to a slightly higher seizure risk, headaches, sexual dysfunction, serotonin syndrome, bleeding risk, increased suicidality in patients  24 years and younger.  After discussion of these risks and benefits, the patient voiced understanding and agreed to proceed.  INCREASE gabapentin 300 BID to TID (adding one PRN dose). Risks, benefits, alternatives discussed with patient including sedation, dizziness/falls risk, GI upset, weight gain.  After discussion of these risks and benefits, the patient voiced understanding and agreed to proceed. UDS ordered, Ernestina reviewed.  S/P:  mirtazapine 22.5 mg nightly. Ineffective 9/24  abilify 5 mg qhs. Not effective 9/24  quetiapine 50 mg nightly, 25 mg q2pm. Prolonged qtc 7/24.  BuSpar 10 mg at 3 PM daily. Ineffective.  sertraline 100 mg daily.  Not effective.  Therapy: Deferred.  Other: s/p referral to neurology for workup of likely dementia.      TREATMENT PLAN/GOALS: Continue supportive psychotherapy efforts and medications as indicated. Treatment and medication options discussed during today's visit. Patient ackowledged and verbally consented to continue with current treatment plan and was educated on the importance of compliance with treatment and follow-up appointments.    MEDICATION ISSUES:  ERNESTINA reviewed as expected.  Discussed medication options and treatment plan of prescribed medication as well as the risks, benefits, and side effects including potential falls, possible impaired driving and metabolic adversities among others. Patient is agreeable to call the office with any worsening of symptoms or onset of side effects. Patient is agreeable to call 911 or go to the nearest ER should he/she begin having SI/HI. No medication side effects or related complaints today.     MEDS ORDERED DURING VISIT:  New Medications Ordered This Visit   Medications    gabapentin (NEURONTIN) 300 MG capsule     Sig: Take 1 capsule by mouth 3 (Three) Times a Day.     Dispense:  90 capsule     Refill:  5     Increasing frequency. Her present script will run out around this time. Thank you for the help. Please call with  questions: [phone].       Return in about 4 weeks (around 11/29/2024).         This document has been electronically signed by Riki Peterson MD  November 1, 2024 10:16 EDT      Part of this note may be an electronic transcription/translation of spoken language to printed text using the Dragon Dictation System.

## 2024-11-01 ENCOUNTER — OFFICE VISIT (OUTPATIENT)
Dept: PSYCHIATRY | Facility: CLINIC | Age: 74
End: 2024-11-01
Payer: MEDICARE

## 2024-11-01 VITALS
SYSTOLIC BLOOD PRESSURE: 181 MMHG | DIASTOLIC BLOOD PRESSURE: 76 MMHG | HEIGHT: 63 IN | HEART RATE: 71 BPM | OXYGEN SATURATION: 94 % | WEIGHT: 176 LBS | BODY MASS INDEX: 31.18 KG/M2

## 2024-11-01 DIAGNOSIS — R68.89 FORGETFULNESS: ICD-10-CM

## 2024-11-01 DIAGNOSIS — M79.2 NEUROPATHIC PAIN: ICD-10-CM

## 2024-11-01 DIAGNOSIS — R44.3 HALLUCINATIONS: ICD-10-CM

## 2024-11-01 DIAGNOSIS — Z63.4 BEREAVEMENT: Primary | ICD-10-CM

## 2024-11-01 DIAGNOSIS — F33.40 RECURRENT MAJOR DEPRESSIVE DISORDER IN REMISSION: ICD-10-CM

## 2024-11-01 DIAGNOSIS — F41.1 GENERALIZED ANXIETY DISORDER: ICD-10-CM

## 2024-11-01 DIAGNOSIS — F51.05 INSOMNIA DUE TO MENTAL CONDITION: ICD-10-CM

## 2024-11-01 RX ORDER — GABAPENTIN 300 MG/1
300 CAPSULE ORAL 3 TIMES DAILY
Qty: 90 CAPSULE | Refills: 5 | Status: SHIPPED | OUTPATIENT
Start: 2024-11-16

## 2024-11-01 SDOH — SOCIAL STABILITY - SOCIAL INSECURITY: DISSAPEARANCE AND DEATH OF FAMILY MEMBER: Z63.4

## 2024-11-08 ENCOUNTER — POP HEALTH PHARMACY (OUTPATIENT)
Dept: PHARMACY | Facility: OTHER | Age: 74
End: 2024-11-08
Payer: MEDICARE

## 2024-11-11 DIAGNOSIS — F41.1 GENERALIZED ANXIETY DISORDER: ICD-10-CM

## 2024-11-11 DIAGNOSIS — M79.2 NEUROPATHIC PAIN: ICD-10-CM

## 2024-11-11 DIAGNOSIS — R44.3 HALLUCINATIONS: ICD-10-CM

## 2024-11-11 DIAGNOSIS — F51.05 INSOMNIA DUE TO MENTAL CONDITION: ICD-10-CM

## 2024-11-11 RX ORDER — GABAPENTIN 300 MG/1
300 CAPSULE ORAL 3 TIMES DAILY
Qty: 270 CAPSULE | Refills: 1 | Status: SHIPPED | OUTPATIENT
Start: 2024-11-16

## 2024-11-11 RX ORDER — QUETIAPINE FUMARATE 25 MG/1
TABLET, FILM COATED ORAL
Qty: 270 TABLET | Refills: 1 | Status: SHIPPED | OUTPATIENT
Start: 2024-11-11

## 2024-11-21 DIAGNOSIS — F41.1 GENERALIZED ANXIETY DISORDER: ICD-10-CM

## 2024-11-21 DIAGNOSIS — F51.05 INSOMNIA DUE TO MENTAL CONDITION: ICD-10-CM

## 2024-11-21 DIAGNOSIS — R44.3 HALLUCINATIONS: ICD-10-CM

## 2024-11-21 DIAGNOSIS — F33.0 MILD RECURRENT MAJOR DEPRESSION: ICD-10-CM

## 2024-11-21 RX ORDER — METOPROLOL TARTRATE 25 MG/1
50 TABLET, FILM COATED ORAL DAILY
Qty: 60 TABLET | Refills: 0 | Status: SHIPPED | OUTPATIENT
Start: 2024-11-21

## 2024-11-21 RX ORDER — ARIPIPRAZOLE 2 MG/1
2 TABLET ORAL DAILY
Qty: 30 TABLET | Refills: 2 | OUTPATIENT
Start: 2024-11-21

## 2024-11-21 NOTE — TELEPHONE ENCOUNTER
The original prescription was discontinued on 8/27/2024 by Riki Peterson MD for the following reason: Reorder. Renewing this prescription may not be appropriate.     PLEASE ADVISE

## 2024-12-11 ENCOUNTER — POP HEALTH PHARMACY (OUTPATIENT)
Dept: PHARMACY | Facility: OTHER | Age: 74
End: 2024-12-11
Payer: MEDICARE

## 2024-12-17 RX ORDER — METOPROLOL TARTRATE 25 MG/1
50 TABLET, FILM COATED ORAL DAILY
Qty: 60 TABLET | Refills: 0 | Status: SHIPPED | OUTPATIENT
Start: 2024-12-17

## 2024-12-30 DIAGNOSIS — M79.2 NEUROPATHIC PAIN: ICD-10-CM

## 2024-12-30 RX ORDER — GABAPENTIN 300 MG/1
300 CAPSULE ORAL 3 TIMES DAILY
Qty: 270 CAPSULE | Refills: 1 | Status: CANCELLED | OUTPATIENT
Start: 2024-12-30

## 2024-12-30 NOTE — TELEPHONE ENCOUNTER
Patient's daughter called requesting refill on Gabapentin. Daughter reports that patient recently moved in with her in [place] and is in the process of transferring care. All of her medications were able to be refilled except for her Gabapentin, the pharmacy told them that since the prescribing provider is out of state, they aren't able to accept electronic prescriptions. Daughter requested for provider to call [facility] in [place], [place] to refill prescription. The phone number for the pharmacy was given by the daughter of the patient, it is [phone]. Informed daughter I would forward this information to the ordering provider, she acknowledged and understood.

## 2025-01-17 RX ORDER — METOPROLOL TARTRATE 25 MG/1
50 TABLET, FILM COATED ORAL DAILY
Qty: 60 TABLET | Refills: 0 | Status: SHIPPED | OUTPATIENT
Start: 2025-01-17

## 2025-03-10 RX ORDER — OMEPRAZOLE 40 MG/1
40 CAPSULE, DELAYED RELEASE ORAL DAILY
Qty: 90 CAPSULE | Refills: 1 | Status: SHIPPED | OUTPATIENT
Start: 2025-03-10

## 2025-03-18 DIAGNOSIS — F51.05 INSOMNIA DUE TO MENTAL CONDITION: ICD-10-CM

## 2025-03-18 DIAGNOSIS — F41.1 GENERALIZED ANXIETY DISORDER: ICD-10-CM

## 2025-03-18 DIAGNOSIS — R44.3 HALLUCINATIONS: ICD-10-CM

## 2025-03-18 RX ORDER — QUETIAPINE FUMARATE 25 MG/1
TABLET, FILM COATED ORAL
Qty: 270 TABLET | Refills: 1 | OUTPATIENT
Start: 2025-03-18

## 2025-03-18 NOTE — TELEPHONE ENCOUNTER
REFILL REQUEST:     QUEtiapine (SEROquel) 25 MG tablet (11/11/2024)     F/UP- NONE IN EPIC.  LOV: 11/01/2024.    PT CANCELED APPT ON 12/04/2024.